# Patient Record
Sex: MALE | Race: ASIAN | NOT HISPANIC OR LATINO | Employment: FULL TIME | URBAN - METROPOLITAN AREA
[De-identification: names, ages, dates, MRNs, and addresses within clinical notes are randomized per-mention and may not be internally consistent; named-entity substitution may affect disease eponyms.]

---

## 2017-01-04 ENCOUNTER — ALLSCRIPTS OFFICE VISIT (OUTPATIENT)
Dept: OTHER | Facility: OTHER | Age: 47
End: 2017-01-04

## 2017-01-19 ENCOUNTER — ALLSCRIPTS OFFICE VISIT (OUTPATIENT)
Dept: OTHER | Facility: OTHER | Age: 47
End: 2017-01-19

## 2017-01-31 ENCOUNTER — ALLSCRIPTS OFFICE VISIT (OUTPATIENT)
Dept: OTHER | Facility: OTHER | Age: 47
End: 2017-01-31

## 2017-02-13 ENCOUNTER — ALLSCRIPTS OFFICE VISIT (OUTPATIENT)
Dept: OTHER | Facility: OTHER | Age: 47
End: 2017-02-13

## 2017-02-13 ENCOUNTER — GENERIC CONVERSION - ENCOUNTER (OUTPATIENT)
Dept: OTHER | Facility: OTHER | Age: 47
End: 2017-02-13

## 2017-02-13 LAB
A/G RATIO (HISTORICAL): 2.2 (ref 1.1–2.5)
ALBUMIN SERPL BCP-MCNC: 4.6 G/DL (ref 3.5–5.5)
ALP SERPL-CCNC: 116 IU/L (ref 39–117)
ALT SERPL W P-5'-P-CCNC: 40 IU/L (ref 0–44)
AST SERPL W P-5'-P-CCNC: 21 IU/L (ref 0–40)
BASOPHILS # BLD AUTO: 0 X10E3/UL (ref 0–0.2)
BASOPHILS # BLD AUTO: 1 %
BILIRUB SERPL-MCNC: 0.4 MG/DL (ref 0–1.2)
BUN SERPL-MCNC: 13 MG/DL (ref 6–24)
BUN/CREA RATIO (HISTORICAL): 13 (ref 9–20)
CALCIUM SERPL-MCNC: 9.2 MG/DL (ref 8.7–10.2)
CHLORIDE SERPL-SCNC: 106 MMOL/L (ref 96–106)
CHOLEST SERPL-MCNC: 161 MG/DL (ref 100–199)
CHOLEST/HDLC SERPL: 4.2 RATIO UNITS (ref 0–5)
CO2 SERPL-SCNC: 19 MMOL/L (ref 18–29)
CREAT SERPL-MCNC: 0.98 MG/DL (ref 0.76–1.27)
DEPRECATED RDW RBC AUTO: 13.6 % (ref 12.3–15.4)
EGFR AFRICAN AMERICAN (HISTORICAL): 107 ML/MIN/1.73
EGFR-AMERICAN CALC (HISTORICAL): 93 ML/MIN/1.73
EOSINOPHIL # BLD AUTO: 0.1 X10E3/UL (ref 0–0.4)
EOSINOPHIL # BLD AUTO: 2 %
GLUCOSE SERPL-MCNC: 107 MG/DL (ref 65–99)
HCT VFR BLD AUTO: 44.9 % (ref 37.5–51)
HDLC SERPL-MCNC: 38 MG/DL
HGB BLD-MCNC: 15.4 G/DL (ref 12.6–17.7)
IMM.GRANULOCYTES (CD4/8) (HISTORICAL): 0 X10E3/UL (ref 0–0.1)
IMM.GRANULOCYTES (CD4/8) (HISTORICAL): 1 %
LDLC SERPL CALC-MCNC: 96 MG/DL (ref 0–99)
LYMPHOCYTES # BLD AUTO: 1.7 X10E3/UL (ref 0.7–3.1)
LYMPHOCYTES # BLD AUTO: 32 %
MCH RBC QN AUTO: 28.4 PG (ref 26.6–33)
MCHC RBC AUTO-ENTMCNC: 34.3 G/DL (ref 31.5–35.7)
MCV RBC AUTO: 83 FL (ref 79–97)
MONOCYTES # BLD AUTO: 0.4 X10E3/UL (ref 0.1–0.9)
MONOCYTES (HISTORICAL): 7 %
NEUTROPHILS # BLD AUTO: 3.2 X10E3/UL (ref 1.4–7)
NEUTROPHILS # BLD AUTO: 57 %
PLATELET # BLD AUTO: 293 X10E3/UL (ref 150–379)
POTASSIUM SERPL-SCNC: 4.3 MMOL/L (ref 3.5–5.2)
RBC (HISTORICAL): 5.43 X10E6/UL (ref 4.14–5.8)
SODIUM SERPL-SCNC: 142 MMOL/L (ref 134–144)
TOT. GLOBULIN, SERUM (HISTORICAL): 2.1 G/DL (ref 1.5–4.5)
TOTAL PROTEIN (HISTORICAL): 6.7 G/DL (ref 6–8.5)
TRIGL SERPL-MCNC: 134 MG/DL (ref 0–149)
VLDLC SERPL CALC-MCNC: 27 MG/DL (ref 5–40)
WBC # BLD AUTO: 5.4 X10E3/UL (ref 3.4–10.8)

## 2017-02-14 LAB — INTERPRETATION (HISTORICAL): NORMAL

## 2017-02-15 ENCOUNTER — GENERIC CONVERSION - ENCOUNTER (OUTPATIENT)
Dept: OTHER | Facility: OTHER | Age: 47
End: 2017-02-15

## 2017-02-27 ENCOUNTER — ALLSCRIPTS OFFICE VISIT (OUTPATIENT)
Dept: OTHER | Facility: OTHER | Age: 47
End: 2017-02-27

## 2017-05-20 ENCOUNTER — ALLSCRIPTS OFFICE VISIT (OUTPATIENT)
Dept: OTHER | Facility: OTHER | Age: 47
End: 2017-05-20

## 2017-06-05 ENCOUNTER — ALLSCRIPTS OFFICE VISIT (OUTPATIENT)
Dept: OTHER | Facility: OTHER | Age: 47
End: 2017-06-05

## 2017-07-24 ENCOUNTER — TRANSCRIBE ORDERS (OUTPATIENT)
Dept: ADMINISTRATIVE | Facility: HOSPITAL | Age: 47
End: 2017-07-24

## 2017-07-24 ENCOUNTER — ALLSCRIPTS OFFICE VISIT (OUTPATIENT)
Dept: OTHER | Facility: OTHER | Age: 47
End: 2017-07-24

## 2017-07-24 DIAGNOSIS — R05.9 COUGH: ICD-10-CM

## 2017-07-24 DIAGNOSIS — R05.9 COUGH: Primary | ICD-10-CM

## 2017-07-25 ENCOUNTER — ALLSCRIPTS OFFICE VISIT (OUTPATIENT)
Dept: OTHER | Facility: OTHER | Age: 47
End: 2017-07-25

## 2017-08-04 ENCOUNTER — HOSPITAL ENCOUNTER (OUTPATIENT)
Dept: RADIOLOGY | Facility: HOSPITAL | Age: 47
Discharge: HOME | End: 2017-08-04
Payer: COMMERCIAL

## 2017-08-04 DIAGNOSIS — R05.9 COUGH: ICD-10-CM

## 2017-08-04 PROCEDURE — 70486 CT MAXILLOFACIAL W/O DYE: CPT

## 2017-08-04 PROCEDURE — 71260 CT THORAX DX C+: CPT

## 2017-08-04 RX ADMIN — IOHEXOL 85 ML: 350 INJECTION, SOLUTION INTRAVENOUS at 08:33

## 2017-08-08 ENCOUNTER — GENERIC CONVERSION - ENCOUNTER (OUTPATIENT)
Dept: OTHER | Facility: OTHER | Age: 47
End: 2017-08-08

## 2017-09-18 ENCOUNTER — ALLSCRIPTS OFFICE VISIT (OUTPATIENT)
Dept: OTHER | Facility: OTHER | Age: 47
End: 2017-09-18

## 2017-10-05 ENCOUNTER — GENERIC CONVERSION - ENCOUNTER (OUTPATIENT)
Dept: OTHER | Facility: OTHER | Age: 47
End: 2017-10-05

## 2017-10-16 ENCOUNTER — HOSPITAL ENCOUNTER (EMERGENCY)
Facility: HOSPITAL | Age: 47
Discharge: HOME | End: 2017-10-16
Attending: EMERGENCY MEDICINE | Admitting: EMERGENCY MEDICINE
Payer: COMMERCIAL

## 2017-10-16 VITALS
HEIGHT: 69 IN | WEIGHT: 185 LBS | DIASTOLIC BLOOD PRESSURE: 85 MMHG | HEART RATE: 69 BPM | RESPIRATION RATE: 16 BRPM | BODY MASS INDEX: 27.4 KG/M2 | OXYGEN SATURATION: 97 % | SYSTOLIC BLOOD PRESSURE: 134 MMHG | TEMPERATURE: 98.2 F

## 2017-10-16 DIAGNOSIS — S01.01XA LACERATION OF SCALP, INITIAL ENCOUNTER: Primary | ICD-10-CM

## 2017-10-16 PROCEDURE — 99282 EMERGENCY DEPT VISIT SF MDM: CPT

## 2017-10-16 RX ORDER — LIDOCAINE HYDROCHLORIDE AND EPINEPHRINE BITARTRATE 20; .01 MG/ML; MG/ML
10 INJECTION, SOLUTION SUBCUTANEOUS ONCE
Status: COMPLETED | OUTPATIENT
Start: 2017-10-16 | End: 2017-10-16

## 2017-10-16 RX ORDER — LAMOTRIGINE 200 MG/1
300 TABLET ORAL DAILY
COMMUNITY
End: 2018-02-19

## 2017-10-16 RX ORDER — ESCITALOPRAM OXALATE 20 MG/1
20 TABLET ORAL DAILY
COMMUNITY
End: 2018-02-19

## 2017-10-16 RX ORDER — ATORVASTATIN CALCIUM 20 MG/1
20 TABLET, FILM COATED ORAL DAILY
COMMUNITY
End: 2018-02-19

## 2017-10-16 RX ORDER — GABAPENTIN 300 MG/1
100 CAPSULE ORAL 2 TIMES DAILY
COMMUNITY
End: 2018-02-19

## 2017-10-16 RX ORDER — BACITRACIN, NEOMYCIN, POLYMYXIN B 400; 3.5; 5 [USP'U]/G; MG/G; [USP'U]/G
OINTMENT TOPICAL 2 TIMES DAILY
Qty: 15 G | Refills: 0 | Status: SHIPPED | OUTPATIENT
Start: 2017-10-16 | End: 2018-02-19

## 2017-10-16 RX ADMIN — LIDOCAINE HYDROCHLORIDE,EPINEPHRINE BITARTRATE 10 ML: 20; .01 INJECTION, SOLUTION INFILTRATION; PERINEURAL at 12:38

## 2017-10-16 NOTE — DISCHARGE INSTRUCTIONS
Please take a list of all of your medications and discharge paperwork with you to all of your follow-up medical visits  Please take all of your medications as directed  Please call your family doctor or return to the ER if you have increased shortness of breath, chest pain, fevers, chills, nausea, vomiting, diarrhea, or any other worsening symptoms  Laceration   WHAT YOU NEED TO KNOW:   A laceration is an injury to the skin and the soft tissue underneath it  Lacerations happen when you are cut or hit by something  They can happen anywhere on the body  DISCHARGE INSTRUCTIONS:   Return to the emergency department if:   · You have heavy bleeding or bleeding that does not stop after 10 minutes of holding firm, direct pressure over the wound  · Your wound opens up  Contact your healthcare provider if:   · You have a fever or chills  · Your laceration is red, warm, or swollen  · You have red streaks on your skin coming from your wound  · You have white or yellow drainage from the wound that smells bad  · You have pain that gets worse, even after treatment  · You have questions or concerns about your condition or care  Medicines:   · Prescription pain medicine  may be given  Ask how to take this medicine safely  · Antibiotics  help treat or prevent a bacterial infection  · Take your medicine as directed  Contact your healthcare provider if you think your medicine is not helping or if you have side effects  Tell him or her if you are allergic to any medicine  Keep a list of the medicines, vitamins, and herbs you take  Include the amounts, and when and why you take them  Bring the list or the pill bottles to follow-up visits  Carry your medicine list with you in case of an emergency  Care for your wound as directed:   · Do not get your wound wet  until your healthcare provider says it is okay  Do not soak your wound in water   Do not go swimming until your healthcare provider says it is okay  Carefully wash the wound with soap and water  Gently pat the area dry or allow it to air dry  · Change your bandages  when they get wet, dirty, or after washing  Apply new, clean bandages as directed  Do not apply elastic bandages or tape too tight  Do not put powders or lotions over your incision  · Apply antibiotic ointment as directed  Your healthcare provider may give you antibiotic ointment to put over your wound if you have stitches  If you have strips of tape over your incision, let them dry up and fall off on their own  If they do not fall off within 14 days, gently remove them  If you have glue over your wound, do not remove or pick at it  If your glue comes off, do not replace it with glue that you have at home  · Check your wound every day for signs of infection such as swelling, redness, or pus  Self-care:   · Apply ice  on your wound for 15 to 20 minutes every hour or as directed  Use an ice pack, or put crushed ice in a plastic bag  Cover it with a towel  Ice helps prevent tissue damage and decreases swelling and pain  · Use a splint as directed  A splint will decrease movement and stress on your wound  It may help it heal faster  A splint may be used for lacerations over joints or areas of your body that bend  Ask your healthcare provider how to apply and remove a splint  · Decrease scarring of your wound  by applying ointments as directed  Do not apply ointments until your healthcare provider says it is okay  You may need to wait until your wound is healed  Ask which ointment to buy and how often to use it  After your wound is healed, use sunscreen over the area when you are out in the sun  You should do this for at least 6 months to 1 year after your injury  Follow up with your healthcare provider as directed: You may need to follow up in 24 to 48 hours to have your wound checked for infection   You will need to return in 3 to 14 days if you have stitches or staples so they can be removed  Care for your wound as directed to prevent infection and help it heal  Write down your questions so you remember to ask them during your visits  © 2017 [address] [employer] Information is for End User's use only and may not be sold, redistributed or otherwise used for commercial purposes  All illustrations and images included in CareNotes® are the copyrighted property of A D [employer] M [employer] Inc  or Aneudy Mccormick  The above information is an  only  It is not intended as medical advice for individual conditions or treatments  Talk to your doctor, nurse or pharmacist before following any medical regimen to see if it is safe and effective for you

## 2017-10-17 NOTE — ED PROVIDER NOTES
History  Chief Complaint   Patient presents with    Head Laceration     hit into the bunk beds while standing up  60-year-old male presents to the ER with scalp laceration  Patient states that about 2-3 hours prior to arrival he was getting out from the bottom bed of a bunk bed when he hit his the top of his head against the metal bar of the bed above  Patient's tetanus is up-to-date  Patient came to the ER for further evaluation of laceration  Head Laceration   Associated symptoms: no fever        Prior to Admission Medications   Prescriptions Last Dose Informant Patient Reported? Taking?   atorvastatin (LIPITOR) 20 mg tablet 10/16/2017 at Unknown time  Yes Yes   Sig: Take 20 mg by mouth daily   escitalopram (LEXAPRO) 20 mg tablet 10/16/2017 at Unknown time  Yes Yes   Sig: Take 20 mg by mouth daily   gabapentin (NEURONTIN) 300 mg capsule 10/16/2017 at Unknown time  Yes Yes   Sig: Take 100 mg by mouth 2 (two) times a day   lamoTRIgine (LaMICtal) 200 MG tablet 10/16/2017 at Unknown time  Yes Yes   Sig: Take 300 mg by mouth daily      Facility-Administered Medications: None       Past Medical History:   Diagnosis Date    Hyperlipidemia     Hypertension     Psychiatric disorder        Past Surgical History:   Procedure Laterality Date    ELBOW ARTHROPLASTY      KNEE ARTHROCENTESIS      SHOULDER ARTHROPLASTY         History reviewed  No pertinent family history  I have reviewed and agree with the history as documented  Social History   Substance Use Topics    Smoking status: Never Smoker    Smokeless tobacco: Never Used    Alcohol use No        Review of Systems   Constitutional: Negative for activity change, fatigue and fever  HENT: Negative for congestion, ear discharge and sore throat  Eyes: Negative for pain and redness  Respiratory: Negative for cough, chest tightness, shortness of breath and wheezing  Cardiovascular: Negative for chest pain     Gastrointestinal: Negative for abdominal pain, diarrhea, nausea and vomiting  Endocrine: Negative for cold intolerance  Genitourinary: Negative for dysuria and urgency  Musculoskeletal: Negative for arthralgias and back pain  Skin: Positive for wound  Neurological: Negative for dizziness, weakness and headaches  Psychiatric/Behavioral: Negative for agitation and behavioral problems  Physical Exam  ED Triage Vitals [10/16/17 1152]   Temperature Pulse Respirations Blood Pressure SpO2   98 2 °F (36 8 °C) 69 16 134/85 97 %      Temp src Heart Rate Source Patient Position - Orthostatic VS BP Location FiO2 (%)   -- Monitor Lying Right arm --      Pain Score       7           Physical Exam   Constitutional: He is oriented to person, place, and time  He appears well-developed and well-nourished  HENT:   Head: Normocephalic and atraumatic  Nose: Nose normal    Mouth/Throat: Oropharynx is clear and moist    2 5 cm curved partial-thickness laceration noted above the left occiput region  Area is mildly bleeding  Eyes: Conjunctivae and EOM are normal    Neck: Normal range of motion  Neck supple  Cardiovascular: Normal rate, regular rhythm and normal heart sounds  Pulmonary/Chest: Effort normal and breath sounds normal    Abdominal: Soft  Bowel sounds are normal  He exhibits no distension  There is no tenderness  Musculoskeletal: Normal range of motion  Neurological: He is alert and oriented to person, place, and time  Skin: Skin is warm  Psychiatric: He has a normal mood and affect  His behavior is normal  Judgment and thought content normal    Nursing note and vitals reviewed        ED Medications  Medications   lidocaine-epinephrine (XYLOCAINE/EPINEPHRINE) 2 %-1:100,000 injection 10 mL (10 mL Infiltration Given 10/16/17 1238)       Diagnostic Studies  Labs Reviewed - No data to display    No orders to display       Procedures  Lac Repair  Date/Time: 10/16/2017 12:40 PM  Performed by: Ying Oliveira  Authorized by: Claudia Denton     Patient location:  ED and bedside  Consent:     Consent obtained:  Verbal    Consent given by:  Patient    Risks discussed:  Infection, pain, retained foreign body, need for additional repair, poor cosmetic result, nerve damage, poor wound healing and vascular damage    Alternatives discussed:  No treatment, delayed treatment, observation and referral  Universal protocol:     Patient identity confirmed:  Verbally with patient  Anesthesia (see MAR for exact dosages): Anesthesia method:  Local infiltration    Local anesthetic:  Lidocaine 2% WITH epi  Laceration details:     Location:  Scalp (2 5 cm curved partial-thickness laceration noted above the left occiput region  )  Repair type:     Repair type:  Simple  Pre-procedure details:     Preparation:  Patient was prepped and draped in usual sterile fashion  Exploration:     Wound exploration: wound explored through full range of motion and entire depth of wound probed and visualized    Treatment:     Area cleansed with:  Saline    Amount of cleaning:  Standard  Skin repair:     Repair method:  Staples    Number of staples:  5  Approximation:     Approximation:  Close    Approximation difficulty:  Simple    Vermilion border: well-aligned    Post-procedure details:     Patient tolerance of procedure: Tolerated well, no immediate complications          Phone Contacts  ED Phone Contact    ED Course  ED Course                                MDM  Number of Diagnoses or Management Options  Laceration of scalp, initial encounter:   Risk of Complications, Morbidity, and/or Mortality  General comments: Patient present with scalp laceration  Laceration repaired with staples  Patient tolerated procedure well  Patient discharged home with return to either his PCP or ER in 5-7 days for staple removal   Patient agrees with discharge plan  Patient well appearing at time of discharge      Patient Progress  Patient progress: stable    CritCare Time    Disposition  Final diagnoses:   Laceration of scalp, initial encounter     ED Disposition     ED Disposition Condition Comment    Discharge  Bernie Shepherd discharge to home/self care  Condition at discharge: Good        Follow-up Information     Follow up With Specialties Details Why Contact Info Additional [address] [place], DO Family Medicine In 1 week for staple removal [address] [facility]  [address] [facility] Emergency Department Emergency Medicine In 1 week for staple removal if your family doctor is not available  [address] [place] [address]  [phone] [facility] [address]        Discharge Medication List as of 10/16/2017  1:23 PM      START taking these medications    Details   neomycin-bacitracin-polymyxin b (NEOSPORIN) ointment Apply topically 2 (two) times a day, Starting Mon 10/16/2017, Print         CONTINUE these medications which have NOT CHANGED    Details   atorvastatin (LIPITOR) 20 mg tablet Take 20 mg by mouth daily, Historical Med      escitalopram (LEXAPRO) 20 mg tablet Take 20 mg by mouth daily, Historical Med      gabapentin (NEURONTIN) 300 mg capsule Take 100 mg by mouth 2 (two) times a day, Historical Med      lamoTRIgine (LaMICtal) 200 MG tablet Take 300 mg by mouth daily, Historical Med           No discharge procedures on file      ED Provider  Electronically Signed by       Marielos Wayne DO  10/17/17 3582

## 2017-10-23 ENCOUNTER — ALLSCRIPTS OFFICE VISIT (OUTPATIENT)
Dept: OTHER | Facility: OTHER | Age: 47
End: 2017-10-23

## 2017-10-24 NOTE — PROGRESS NOTES
Assessment  1  Laceration of scalp (873 0) (S01 01XA)   2  Dysfunction of both eustachian tubes (381 81) (H69 83)   3  BMI 27 0-27 9,adult (V85 23) (Z68 27)    Plan  BMI 27 0-27 9,adult    · Begin a limited exercise program ; Status:Complete;   Done: [id number]  Dysfunction of both eustachian tubes    · MethylPREDNISolone 4 MG Oral Tablet; USE AS DIRECTED    Discussion/Summary    Staples removed from scalp without incident  No bleeding or wound dehiscence  Possible side effects of new medications were reviewed with the patient/guardian today  The treatment plan was reviewed with the patient/guardian  The patient/guardian understands and agrees with the treatment plan      Chief Complaint  Here to have staples removed from his head after he hit his head on bunk beds  They were placed at [facility] ER 7 days ago [facility]      History of Present Illness  HPI: Sustained a laceration to his scalp when he was moving a set of bunk beds and hit his head  Was evaluated in the ER and had wound closed with staples  Here today to have them reviewed  ears are still bothering him after completing antibiotics and using drops  They are popping constantly  Not taking anything OTC for symptoms  Review of Systems    Constitutional: no fever or chills, feels well, no tiredness, no recent weight loss or weight gain  ENT: earache, but-- as noted in HPI  Respiratory: no shortness of breath-- and-- no cough  Integumentary: skin wound, but-- as noted in HPI  Active Problems  1  Allergic rhinitis (477 9) (J30 9)   2  Benign familial tremor (333 1) (G25 0)   3  Bipolar disorder (296 80) (F31 9)   4  Bronchitis (490) (J40)   5  Chronic cough (786 2) (R05)   6  Chronic migraine (346 70) (G43 709)   7  Cough (786 2) (R05)   8  Cubital Tunnel Syndrome (354 2)   9  Depression (311) (F32 9)   10  Hand pain, unspecified laterality   11  History of sinus surgery (V45 89) (Z98 890)   12  Hypercholesterolemia (272 0) (E78 00)   13  Migraine, unspecified, not intractable, without status migrainosus (346 90) (G43 909)   14  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 42) (F51 01)   15  Right knee pain (719 46) (M25 561)   16  Right knee sprain (844 9) (S83 91XA)   17  Screening for cardiovascular condition (V81 2) (Z13 6)   18  Screening for prostate cancer (V76 44) (Z12 5)   19  Screening for thyroid disorder (V77 0) (Z13 29)   20  Tinnitus, unspecified laterality (388 30) (H93 19)    Past Medical History  1  History of Acute atopic conjunctivitis, unspecified laterality (372 05) (H10 10)   2  History of Acute maxillary sinusitis (461 0) (J01 00)   3  History of Acute otitis media, bilateral (382 9) (H66 93)   4  History of Acute suppurative otitis media without spontaneous rupture of ear drum,   unspecified laterality   5  Acute upper respiratory infection (465 9) (J06 9)   6  History of Acute upper respiratory infection (465 9) (J06 9)   7  History of Bilateral impacted cerumen (380 4) (H61 23)   8  Contact dermatitis due to plant (692 6) (L25 5)   9  History of acute bronchitis (V12 69) (Z87 09)   10  History of acute sinusitis (V12 69) (Z87 09)   11  History of acute sinusitis (V12 69) (Z87 09)   12  History of acute sinusitis (V12 69) (Z87 09)   13  Otalgia, unspecified laterality (388 70) (H92 09)   14  Well adult on routine health check (V70 0) (Z00 00)  Active Problems And Past Medical History Reviewed: The active problems and past medical history were reviewed and updated today  Family History  Mother    1  Family history of migraine headaches (V17 2) (Z82 0)  Family History    2  Family history of Arthritis (V17 7)   3  Family history of Breast Cancer (V16 3)   4  Family history of cardiac disorder (V17 49) (Z82 49)   5   Family history of hypertension (V17 49) (Z82 49)    Social History   · Being A Social Drinker   · Daily Tea Consumption (___ Cups/Day)   ·    · Exposure to secondhand smoke (V15 89) (Z77 22)   · Marital History - Currently    · Never a smoker   · Occupation   ·  for [address] [place] [address] history was reviewed and is unchanged  Surgical History  1  History of Elbow Surgery   2  History of Sinus Surgery   3  History of Tonsillectomy    Current Meds   1  Atorvastatin Calcium 20 MG Oral Tablet; TAKE 1 TABLET DAILY  Requested for:   09GJM0401; Last Rx:[id number] Ordered   2  Gabapentin 300 MG Oral Capsule; TAKE 1 CAPSULE 3 TIMES DAILY; Therapy: 91TWP1249 to (Evaluate:30Nov2017)  Requested for: 04Ruz6653; Last   Rx:53Vpo7192 Ordered   3  LaMICtal 150 MG Oral Tablet; 2 Every Day; Therapy: 51KJQ6770 to  Requested for: 13GFJ0800 Recorded   4  Lexapro 20 MG Oral Tablet; 1 Every Day; Therapy: 64TCY4851 to  Requested for: 20CPP5807 Recorded    The medication list was reviewed and updated today  Allergies  1  No Known Drug Allergies    Vitals   Recorded: 92ZXI1048 04:10PM   Temperature 99 1 F   Heart Rate 96   Respiration 16   Systolic 974   Diastolic 88   Height 5 ft 9 5 in   Weight 189 lb    BMI Calculated 27 51   BSA Calculated 2 03     Physical Exam    Constitutional   General appearance: No acute distress, well appearing and well nourished  Eyes   Conjunctiva and lids: No swelling, erythema, or discharge  Ears, Nose, Mouth, and Throat   Otoscopic examination: Abnormal   Exam of the right middle ear showed a middle ear effusion  Exam of the left middle ear showed a middle ear effusion  Nasal mucosa, septum, and turbinates: Normal without edema or erythema  Oropharynx: Normal with no erythema, edema, exudate or lesions  Pulmonary   Respiratory effort: No increased work of breathing or signs of respiratory distress  Auscultation of lungs: Clear to auscultation, equal breath sounds bilaterally, no wheezes, no rales, no rhonci  Cardiovascular   Auscultation of heart: Normal rate and rhythm, normal S1 and S2, without murmurs      Examination of extremities for edema and/or varicosities: Normal     Lymphatic   Palpation of lymph nodes in neck: No lymphadenopathy  Skin scalp laceration wound edges well approximated, staples intact, no drainage  Psychiatric   Mood and affect: Normal          Attending Note  Collaborating Physician Note: Collaborating Note: I agree with the Advanced Practitioner note  Future Appointments    Date/Time Provider Specialty Site   10/30/2017 02:00 PM CARROL Crooks  Neurology NEUROLOGY [facility]   12/22/2017 09:00 AM CARROL Crooks   Neurology NEUROLOGY [facility]   Electronically signed by : Vikash Nuñez; Oct 23 2017  4:37PM EST                       (Author)    Electronically signed by : Jose Muse DO; Oct 23 2017  4:38PM EST                       (Review)

## 2017-10-25 NOTE — PROGRESS NOTES
Assessment  1  Chronic cough (786 2) (R05)   2  History of sinus surgery (V45 89) (Z98 730)    Plan  Chronic cough    · Gabapentin 300 MG Oral Capsule; 1 tab at HS x 3 days, the 1 tab BID x 7 days,  then TID   Rx By: Ashley Saundesr; Dispense: 30 Days ; #:80 Capsule; Refill: 0;For: Chronic cough; BYRON = N; Verified Transmission to [address]; Msg to Pharmacy: extra tabsl; Last Updated By: System, [employer]; 7/24/2017 3:36:57 PM   · (1) BASIC METABOLIC PROFILE; Status:Active; Requested for:15Atv0923;    Perform:[facility]; Order Comments:CT of chest with contrast; Due:31Kki6706; Ordered; For:Chronic cough; Ordered By:Lisa Ibrahim;   · CT CHEST W CONTRAST; Status:Need Information - Financial Authorization; Requested  for:83Ira6296;    Perform:[facility]; LGZ:[id number]; Ordered; For:Chronic cough; Ordered By:Lisa Ibrahim;   · CT SINUS WO CONTRAST; Status:Need Information - Financial Authorization; Requested for:[id number];    Perform:[facility]; VXS:[id number]; Ordered; For:Chronic cough; Ordered By:Lisa Ibrahim;    Discussion/Summary  Discussion Summary:   Fredirick Dancer has persistent cough  He did have improvement with gabapentin for cough hypersensitivity syndrome or sensory neuropathic cough  Gabapentin benefits cough through neuromodulation  Dosing guidelines outline 300mg at HS x 3 days, then BID x 7days then three times daily  I am ordering one month prescription  will have CT of chest with contrast and CT of sinuses  He has history of chronic sinus disease and resultant upper airway cough syndrome is differential diagnosis  did not have any lethargy or mental impairment with cough  up in 3 months  Counseling Documentation With Imm: The patient was counseled regarding  Goals and Barriers: The patient has the current Goals: Fredirick Dancer will take gabapentin 300 mg as outlined above  Patient's Capacity to Self-Care: Patient is able to Self-Care  Active Problems  1   Acute sinusitis (461 9) (J01 90)   2  Allergic rhinitis (477 9) (J30 9)   3  Benign familial tremor (333 1) (G25 0)   4  Bipolar disorder (296 80) (F31 9)   5  Bronchitis (490) (J40)   6  Chronic cough (786 2) (R05)   7  Chronic migraine (346 70) (G43 709)   8  Cough (786 2) (R05)   9  Cubital Tunnel Syndrome (354 2)   10  Depression (311) (F32 9)   11  Hand pain, unspecified laterality   12  Hypercholesterolemia (272 0) (E78 00)   13  Migraine headache (346 90) (G43 909)   14  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 42) (F51 01)   15  Right knee pain (719 46) (M25 561)   16  Right knee sprain (844 9) (S83 91XA)   17  Screening for cardiovascular condition (V81 2) (Z13 6)   18  Screening for prostate cancer (V76 44) (Z12 5)   19  Screening for thyroid disorder (V77 0) (Z13 29)   20  Tinnitus, unspecified laterality (388 30) (H93 19)    Chief Complaint  Chief Complaint Free Text Note Form: Jarocho Prior presents with cough, non-productive  He states he has had cough since February 2016  Denies SOB      History of Present Illness  HPI: Joselyn Benz is here today for cough  He has chronic cough and was last seen by Dr Saul Jefferson on 1/31/17 and December 19, 2016  He was prescribed both prednisone and Advair  There was no improvement in cough and gabapentin was prescribed  he had very good results within 2 week period done in 12/16 revealed near normal spirometry  PEF was decreased at 42% of predicted; this was likely due to suboptimal effort  was prescribed gabapentin for chronic cough neuromodulation  He did escalate dose to 3x daily of gabapentin 300 mg with good result  took the gabapentin for about one month and stopped  He cough has worsened  Review of Systems  Complete-Male Pulm:   Constitutional: No fever or chills, feels well, no tiredness, no recent weight gain or weight loss  Eyes: no complaints of vision problems  ENT: no rhinitis, no PND, no epistaxis  Cardiovascular: no palpitations, no chest pain     Respiratory: as noted in HPI--    The patient presents with complaints of gradual onset of intermittent episodes of moderate cough  His symptoms are caused by no known event  Symptoms are worsening  Gastrointestinal: no complaints of esophageal reflux, no abdominal pain  Genitourinary: no urinary retention  Musculoskeletal: no arthralgias, no joint swelling, no myalgias  Integumentary: no rash, no lesions  Neurological: no headache, no fainting, no weakness  Psychiatric: no anxiety, no depression  Hematologic/Lymphatic: no complaints of swollen glands  Past Medical History  1  History of Acute atopic conjunctivitis, unspecified laterality (372 05) (H10 10)   2  History of Acute maxillary sinusitis (461 0) (J01 00)   3  History of Acute suppurative otitis media without spontaneous rupture of ear drum,   unspecified laterality   4  Acute upper respiratory infection (465 9) (J06 9)   5  History of Acute upper respiratory infection (465 9) (J06 9)   6  Contact dermatitis due to plant (692 6) (L25 5)   7  History of acute bronchitis (V12 69) (Z87 09)   8  History of acute sinusitis (V12 69) (Z87 09)   9  Otalgia, unspecified laterality (388 70) (H92 09)   10  Well adult on routine health check (V70 0) (Z00 00)    Surgical History  1  History of Elbow Surgery   2  History of Sinus Surgery   3  History of Tonsillectomy  Surgical History Reviewed: The surgical history was reviewed and updated today  Family History  Mother    1  Family history of migraine headaches (V17 2) (Z82 0)  Family History    2  Family history of Arthritis (V17 7)   3  Family history of Breast Cancer (V16 3)   4  Family history of cardiac disorder (V17 49) (Z82 49)   5  Family history of hypertension (V17 49) (Z82 49)  Family History Reviewed: The family history was reviewed and updated today         Social History   · Being A Social Drinker   · Daily Tea Consumption (___ Cups/Day)   ·    · Exposure to secondhand smoke (V15 89) (Z77 22)   · Marital History - Currently    · Never a smoker   · Occupation   ·  for [address] [place] [address] History Reviewed: The social history was reviewed and updated today  Current Meds   1  Atorvastatin Calcium 20 MG Oral Tablet; TAKE 1 TABLET DAILY  Requested for:   03OXS8067; Last Rx:73Gzw8995 Ordered   2  Azithromycin 250 MG Oral Tablet; TAKE 2 TABLETS ON DAY 1 THEN TAKE 1 TABLET A   DAY FOR 4 DAYS; Therapy: 41OIX0950 to (Last BN:83NEL6542)  Requested for: 90CVH8291 Ordered   3  Fluticasone Propionate 50 MCG/ACT Nasal Suspension; USE 2 SPRAYS IN EACH   NOSTRIL ONCE DAILY; Therapy: 72REV0835 to (Last Rx:85Iof1909)  Requested for: 70CPF4726 Ordered   4  LaMICtal 150 MG Oral Tablet; 2 Every Day; Therapy: 95DDB7454 to  Requested for: 15OTJ8818 Recorded   5  Lexapro 20 MG Oral Tablet; 1 Every Day; Therapy: 32SUI4558 to  Requested for: 51IKZ7124 Recorded   6  MethylPREDNISolone 4 MG Oral Tablet Therapy Pack; as directed; Therapy: 17MRP2066 to (Last CX:99QQJ1135)  Requested for: 72KLL4479 Ordered  Medication List Reviewed: The medication list was reviewed and updated today  Allergies  1  No Known Drug Allergies    Vitals  Vital Signs    Recorded: 14Mij8210 03:06PM   Temperature 97 7 F, Oral   Heart Rate 80   Pulse Quality Normal   Respiration Quality Normal   Respiration 18   Systolic 048, LUE, Sitting   Diastolic 78, LUE, Sitting   Height 5 ft 9 5 in   Weight 186 lb    BMI Calculated 27 07   BSA Calculated 2 01   O2 Saturation 96, RA     Physical Exam    Constitutional   General appearance: No acute distress, well appearing and well nourished  Eyes   Examination of pupil and irises: Anicteric, pupils reactive  Ears, Nose, Mouth, and Throat   External inspection of ears and nose: Normal     Nasal mucosa, septum, and turbinates: Normal without edema or erythema  Lips, teeth, and gums: Normal, good dentition  Oropharynx: Normal with no erythema, edema, exudate or lesions    Mallampati Classification: 4  Neck   Neck: Supple, symmetric, trachea midline, no masses  Jugular veins: Normal     Pulmonary   Chest: Normal     Auscultation of lungs: Clear to auscultation, no rales, no crackles, no wheezing  Cardiovascular   Auscultation of heart: Normal rate and rhythm, normal S1 and S2, no murmurs  Examination of extremities for edema and/or varicosities: Normal     Abdomen   Abdomen: Soft, non-tender  Lymphatic   Palpation of lymph nodes in neck: No lymphadenopathy  Musculoskeletal   Gait and station: Normal     Digits and nails: Normal without clubbing or cyanosis  Neurologic   Mental Status: Normal  Not confused, no evidence of dementia, good comprehension, good concentration  Skin   Skin and subcutaneous tissue: Limited exam shows no rash  Psychiatric   Orientation to person, place and time: Normal     Mood and affect: Normal        Future Appointments    Date/Time Provider Specialty Site   07/25/2017 03:00 PM CARROL Helm  Neurology NEUROLOGY [facility]   09/18/2017 10:00 AM CARROL Helm   Neurology NEUROLOGY [facility]   Electronically signed by : ABBY cShulte; Jul 24 2017  3:41PM EST                       (Author)    Electronically signed by : DAVID Sotelo ; Sep  1 2017  9:56AM EST                       (Author)

## 2017-10-30 ENCOUNTER — ALLSCRIPTS OFFICE VISIT (OUTPATIENT)
Dept: OTHER | Facility: OTHER | Age: 47
End: 2017-10-30

## 2017-10-31 NOTE — PROGRESS NOTES
Assessment  1  Migraine, unspecified, not intractable, without status migrainosus (346 90) (G44 542)    Discussion/Summary  Discussion Summary:   Patient is doing quite well  His headaches are 1 every 1-2months and manageable with prn sumavel injections  will return for botox therapy on Dec 2nd  Counseling Documentation With Imm: The patient was counseled regarding instructions for management,-- risk factor reductions,-- prognosis,-- patient and family education,-- impressions,-- risks and benefits of treatment options,-- importance of compliance with treatment  total time of encounter was 20 minutes-- and-- 10 minutes was spent counseling  Goals and Barriers: The patient has the current Goals: At his goal        Patient's Capacity to Self-Care: Patient is able to Self-Care  Medication SE Review and Pt Understands Tx: The treatment plan was reviewed with the patient/guardian  The patient/guardian understands and agrees with the treatment plan       Headache [facility]:   The patient was counseled regarding;   Discussed side effects of all medications prescribed today to the patient in detail  Patient education was completed today and we also discussed precautions for rebound headaches  --    When patient has a moderate to severe headache, they should seek rest, initiate relaxation and apply cold compresses to the head  Also recommended to the patient :  1  Maintain regular sleep schedule -- 2  Limit over the counter medications  (No more than 3 times a week)  -- 3  Maintain headache diary  -- 4  Limit caffeine to 1-2 cups a day or less  -- 5  Avoid dietary trigger  (list given to the patient and reviewed with them)  -- 6  Patient is to have regular frequent meals to prevent headache onset  Chief Complaint  Chief Complaint Free Text Note Form: migraines      History of Present Illness  HPI: Mr Daniel Steel is a 55 yo M with h/o migraines since age of 6 presents as follow up   He has started getting botox therapy for 2 years  His migraine frequency was 8+/month  Since botox therapy, he gets about 1-2/month  Heat usually triggers it  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  He had previously tried verapamil and topamax and since botox was working, he was taken off of them  He's doing quite well  Since last botox therapy, he had only one headache  He takes sumavel injections prn  It does work  He denies any side effects of botox  His frequency of headaches is even further declining now to one every 1-2 months  Review of Systems  Neurological ROS:   Constitutional: no fever, no chills, no recent weight gain, no recent weight loss, no complaints of feeling tired, no changes in appetite  Neurological General: no headache,-- no nausea or vomiting,-- no lightheadedness,-- no convulsions,-- no syncope-- and-- no trauma  Active Problems  1  Allergic rhinitis (477 9) (J30 9)   2  Benign familial tremor (333 1) (G25 0)   3  Bipolar disorder (296 80) (F31 9)   4  BMI 27 0-27 9,adult (V85 23) (Z68 27)   5  Bronchitis (490) (J40)   6  Chronic cough (786 2) (R05)   7  Chronic migraine (346 70) (G43 709)   8  Cough (786 2) (R05)   9  Cubital Tunnel Syndrome (354 2)   10  Depression (311) (F32 9)   11  Dysfunction of both eustachian tubes (381 81) (H69 83)   12  Hand pain, unspecified laterality   13  History of sinus surgery (V45 89) (Z98 890)   14  Hypercholesterolemia (272 0) (E78 00)   15  Laceration of scalp (873 0) (S01 01XA)   16  Migraine, unspecified, not intractable, without status migrainosus (346 90) (G43 909)   17  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 42) (F51 01)   18  Right knee pain (719 46) (M25 561)   19  Right knee sprain (844 9) (S83 91XA)   20  Screening for cardiovascular condition (V81 2) (Z13 6)   21  Screening for prostate cancer (V76 44) (Z12 5)   22  Screening for thyroid disorder (V77 0) (Z13 29)   23  Tinnitus, unspecified laterality (388 30) (H93 19)    Past Medical History  1   History of Acute atopic conjunctivitis, unspecified laterality (372 05) (H10 10)   2  History of Acute maxillary sinusitis (461 0) (J01 00)   3  History of Acute otitis media, bilateral (382 9) (H66 93)   4  History of Acute suppurative otitis media without spontaneous rupture of ear drum,   unspecified laterality   5  Acute upper respiratory infection (465 9) (J06 9)   6  History of Acute upper respiratory infection (465 9) (J06 9)   7  History of Bilateral impacted cerumen (380 4) (H61 23)   8  Contact dermatitis due to plant (692 6) (L25 5)   9  History of acute bronchitis (V12 69) (Z87 09)   10  History of acute sinusitis (V12 69) (Z87 09)   11  History of acute sinusitis (V12 69) (Z87 09)   12  History of acute sinusitis (V12 69) (Z87 09)   13  Otalgia, unspecified laterality (388 70) (H92 09)   14  Well adult on routine health check (V70 0) (Z00 00)    Surgical History  1  History of Elbow Surgery   2  History of Sinus Surgery   3  History of Tonsillectomy    Family History  Mother    1  Family history of migraine headaches (V17 2) (Z82 0)  Family History    2  Family history of Arthritis (V17 7)   3  Family history of Breast Cancer (V16 3)   4  Family history of cardiac disorder (V17 49) (Z82 49)   5  Family history of hypertension (V17 49) (Z82 49)    Social History   · Being A Social Drinker   · Daily Tea Consumption (___ Cups/Day)   ·    · Exposure to secondhand smoke (V15 89) (Z77 22)   · Marital History - Currently    · Never a smoker   · Occupation    Current Meds   1  Atorvastatin Calcium 20 MG Oral Tablet; TAKE 1 TABLET DAILY  Requested for:   74YDH3111; Last Rx:73Fgp5777 Ordered   2  Gabapentin 300 MG Oral Capsule; TAKE 1 CAPSULE 3 TIMES DAILY; Therapy: 90DXK4025 to (Evaluate:30Nov2017)  Requested for: 46Lmo9543; Last   Rx:01Sep2017 Ordered   3  LaMICtal 150 MG Oral Tablet; 2 Every Day; Therapy: 84BLR8334 to  Requested for: 39XTA5311 Recorded   4   Lexapro 20 MG Oral Tablet; 1 Every Day; Therapy: 67THW9622 to  Requested for: 90HBF3987 Recorded   5  MethylPREDNISolone 4 MG Oral Tablet; USE AS DIRECTED; Therapy: 00SBA9158 to (Last Rx:23Oct2017)  Requested for: 23Oct2017 Ordered    Allergies  1  No Known Drug Allergies    Vitals  Signs   Recorded: 87RDL1221 01:55PM   Heart Rate: 74  Systolic: 629, LUE  Diastolic: 80, LUE  Height: 5 ft 9 5 in  Weight: 182 lb   BMI Calculated: 26 49  BSA Calculated: 1 99  O2 Saturation: 99    Physical Exam    Constitutional   General appearance: No acute distress, well appearing and well nourished  Eyes   Ophthalmoscopic examination: Vision is grossly normal  Gross visual field testing by confrontation shows no abnormalities  EOMI in both eyes  Conjunctivae clear  Eyelids normal palpebral fissures equal  Orbits exhibit normal position  No discharge from the eyes  PERRL  Musculoskeletal   Gait and station: Normal gait, stance and balance  Muscle strength: Normal strength throughout  Muscle tone: No atrophy, abnormal movements, flaccidity, cogwheeling or spasticity  Neurologic   Orientation to person, place, and time: Normal     Recent and remote memory: Demonstrates normal memory  Attention span and concentration: Normal thought process and attention span  Language: Names objects, able to repeat phrases and speaks spontaneously  Fund of knowledge: Normal vocabulary with appropriate knowledge of current events and past history  2nd cranial nerve: Normal     3rd, 4th, and 6th cranial nerves: Normal     5th cranial nerve: Normal     7th cranial nerve: Normal     8th cranial nerve: Normal     9th cranial nerve: Normal     11th cranial nerve: Normal     12th cranial nerve: Normal     Sensation: Normal     Reflexes: Normal     Coordination: Normal        Future Appointments    Date/Time Provider Specialty Site   12/22/2017 09:00 AM CARROL Angel   Neurology NEUROLOGY [facility]   Electronically signed by : CARROL Neal ; Oct 30 2017  2:06PM EST                       (Author)

## 2017-12-22 ENCOUNTER — ALLSCRIPTS OFFICE VISIT (OUTPATIENT)
Dept: OTHER | Facility: OTHER | Age: 47
End: 2017-12-22

## 2017-12-23 NOTE — PROGRESS NOTES
Chief Complaint   migraines      Current Meds    1  Atorvastatin Calcium 20 MG Oral Tablet; TAKE 1 TABLET DAILY  Requested for:     02EHJ4728; Last Rx:86Drz3777 Ordered   2  Gabapentin 300 MG Oral Capsule; TAKE 1 CAPSULE 3 TIMES DAILY; Therapy: 20HSS5514 to (Evaluate:30Nov2017)  Requested for: 18Wgf3026; Last     Rx:06Jbb7037 Ordered   3  LaMICtal 150 MG Oral Tablet; 2 Every Day; Therapy: 21RCB8923 to  Requested for: 67RNM4746 Recorded   4  Lexapro 20 MG Oral Tablet; 1 Every Day; Therapy: 54XFW2989 to  Requested for: 43LJC9275 Recorded   5  MethylPREDNISolone 4 MG Oral Tablet; USE AS DIRECTED; Therapy: 91TPV6172 to (Last Rx:23Oct2017)  Requested for: 23Oct2017 Ordered    Allergies   1  No Known Drug Allergies    Vitals    Recorded: 22Dec2017 09:06AM   Heart Rate 59   Systolic 537, LUE   Diastolic 76, LUE   Height 5 ft 9 5 in   Weight 184 lb    BMI Calculated 26 78   BSA Calculated 2   O2 Saturation 98     Procedure        Risks and benefits were discussed with the patient  We discussed possible complications, including bleeding  Procedure Note:          Procedure: Headache botox injection  Indication: Chronic migraine headache  patient has been stable  no worsening of migraines  Risks were discussed with the patient  We discussed possible complications, including infection  Written consent was obtained prior to the procedure  The site was prepped with an alcohol swab  Anesthesia: No anesthesia was needed  Procedure Note:      200 u --Mml of Botox-A and 4 8cc ml of sterile saline were mixed  -- EMG guidance was not used in identifying the injection site  20 units, 2 sites unit(s) was injected into the procerus muscle  5 unit(s) was injected into the  right  muscle  5 unit(s) was injected into the  left  muscle  10 unit(s) was injected into the  right frontalis muscle  10 unit(s) was injected into the  left frontalis muscle      35 unit(s) was injected into the  right temporalis muscle  35 u, 7 sites unit(s) was injected into the  left temporalis muscle     0 unit(s) was injected into the  right occipitalis muscle     0 unit(s) was injected into the  left occipitalis muscle  15 unit(s) was injected into the  right cervical paraspinal muscle  15 unit(s) was injected into the  left cervical paraspinal muscle  15 unit(s) was injected into the  right trapezius muscle  15 unit(s) was injected into the  left trapezius muscle  A total of 180units were used  A total of 20units were discarded  Botox Lot:  Lot number: [id number] and [id number] -- Expiration date: 06/2020 and 06/2020  Patient Status:  the patient tolerated the procedure well  Follow-up in the office in 6 week(s)  Assessment   1  Chronic migraine (346 70) (G42 416)    Discussion/Summary      Kept same dose and sites as previously        Signatures    Electronically signed by : CARROL Huang ; Dec 22 2017  9:39AM EST                       (Author)

## 2017-12-26 ENCOUNTER — ALLSCRIPTS OFFICE VISIT (OUTPATIENT)
Dept: OTHER | Facility: OTHER | Age: 47
End: 2017-12-26

## 2017-12-27 NOTE — PROGRESS NOTES
Assessment   1  Otalgia, unspecified laterality (388 70) (H92 09)   2  Foreign body of right ear, initial encounter (436 2743) (T16 1XXA)   3  Hypercholesterolemia (272 0) (E78 00)   4  BMI 27 0-27 9,adult (V85 23) (Z68 27)    Plan   Dysfunction of both eustachian tubes    · MethylPREDNISolone 4 MG Oral Tablet  Hypercholesterolemia    · Atorvastatin Calcium 20 MG Oral Tablet; TAKE 1 TABLET DAILY  Otalgia, unspecified laterality    · Sulfamethoxazole-Trimethoprim 800-160 MG Oral Tablet; TAKE 1 TABLET TWICE    DAILY    Discussion/Summary   Possible side effects of new medications were reviewed with the patient/guardian today  The treatment plan was reviewed with the patient/guardian  The patient/guardian understands and agrees with the treatment plan      Provider Comments   Provider Comments:    flush right ear done, feather shown to pt has symptoms so abx if no better stable, f/u suggested but refill given  unchanged          Chief Complaint   Pt c/o right ear ache for the past days  er/c ma  History of Present Illness   HPI: few days ear sensitive draining/pus/blood   stable, sees Dr Phil Rangel chol meds, wants refill, has seen Dr Kashif Davis in past 6m, knows he needs to keep f/u for monitoring          Review of Systems        Cardiovascular: no chest pain  Neurological: no dizziness  Active Problems   1  Allergic rhinitis (477 9) (J30 9)   2  Benign familial tremor (333 1) (G25 0)   3  Bipolar disorder (296 80) (F31 9)   4  BMI 27 0-27 9,adult (V85 23) (Z68 27)   5  Chronic cough (786 2) (R05)   6  Chronic migraine (346 70) (G43 709)   7  Dysfunction of both eustachian tubes (381 81) (H69 83)   8  History of sinus surgery (V45 89) (Z98 890)   9  Hypercholesterolemia (272 0) (E78 00)   10  Migraine, unspecified, not intractable, without status migrainosus (346 90) (G43 909)   11  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 42) (F51 01)   12  Right knee sprain (844 9) (S83 91XA)   13   Screening for cardiovascular condition (V81 2) (Z13 6)   14  Screening for prostate cancer (V76 44) (Z12 5)   15  Screening for thyroid disorder (V77 0) (Z13 29)    Past Medical History   1  History of Acute atopic conjunctivitis, unspecified laterality (372 05) (H10 10)   2  History of Acute maxillary sinusitis (461 0) (J01 00)   3  History of Acute otitis media, bilateral (382 9) (H66 93)   4  History of Acute suppurative otitis media without spontaneous rupture of ear drum,     unspecified laterality   5  Acute upper respiratory infection (465 9) (J06 9)   6  History of Acute upper respiratory infection (465 9) (J06 9)   7  History of Bilateral impacted cerumen (380 4) (H61 23)   8  Contact dermatitis due to plant (692 6) (L25 5)   9  History of acute bronchitis (V12 69) (Z87 09)   10  History of acute sinusitis (V12 69) (Z87 09)   11  History of acute sinusitis (V12 69) (Z87 09)   12  History of acute sinusitis (V12 69) (Z87 09)   13  Otalgia, unspecified laterality (388 70) (H92 09)   14  Well adult on routine health check (V70 0) (Z00 00)    Family History   Mother    1  Family history of migraine headaches (V17 2) (Z82 0)  Family History    2  Family history of Arthritis (V17 7)   3  Family history of Breast Cancer (V16 3)   4  Family history of cardiac disorder (V17 49) (Z82 49)   5  Family history of hypertension (V17 49) (Z82 49)  Family History Reviewed: The family history was reviewed and updated today  Social History    · Being A Social Drinker   · Daily Tea Consumption (___ Cups/Day)   ·    · Exposure to secondhand smoke (V15 89) (Z77 22)   · Marital History - Currently    · Never a smoker   · Occupation   ·  for [address] [place] [address] [place] history was reviewed and updated today  Surgical History   1  History of Elbow Surgery   2  History of Sinus Surgery   3  History of Tonsillectomy    Current Meds    1   Atorvastatin Calcium 20 MG Oral Tablet; TAKE 1 TABLET DAILY  Requested for: 00LGM4594; Last Rx:13Edw2187 Ordered   2  Gabapentin 300 MG Oral Capsule; TAKE 1 CAPSULE 3 TIMES DAILY; Therapy: 60TTS1287 to (Evaluate:30Nov2017)  Requested for: 01Sep2017; Last     Rx:01Sep2017 Ordered   3  LaMICtal 150 MG Oral Tablet; 2 Every Day; Therapy: 99OFE6432 to  Requested for: 42RKG2237 Recorded   4  Lexapro 20 MG Oral Tablet; 1 Every Day; Therapy: 37NFG7784 to  Requested for: 36DCA6369 Recorded   5  MethylPREDNISolone 4 MG Oral Tablet; USE AS DIRECTED; Therapy: 38SAN7219 to (Last Rx:23Oct2017)  Requested for: 23Oct2017 Ordered    Allergies   1  No Known Drug Allergies    Vitals    Recorded: 82LBR9225 10:09AM   Temperature 98 4 F   Heart Rate 76   Respiration 20   Systolic 726   Diastolic 80   Height 5 ft 9 5 in   Weight 186 lb    BMI Calculated 27 07   BSA Calculated 2 01     Physical Exam        Constitutional      General appearance: No acute distress, well appearing and well nourished  Eyes      Conjunctiva and lids: No swelling, erythema, or discharge  Pupils and irises: Equal, round and reactive to light  Ears, Nose, Mouth, and Throat      External inspection of ears and nose: Normal        Otoscopic examination: Abnormal  -- TMs appear normal, a feather was seen in right ear canal       Nasal mucosa, septum, and turbinates: Normal without edema or erythema  Oropharynx: Normal with no erythema, edema, exudate or lesions  Pulmonary      Respiratory effort: No increased work of breathing or signs of respiratory distress  Auscultation of lungs: Clear to auscultation, equal breath sounds bilaterally, no wheezes, no rales, no rhonci  Cardiovascular      Auscultation of heart: Normal rate and rhythm, normal S1 and S2, without murmurs  Examination of extremities for edema and/or varicosities: Normal        Carotid pulses: Normal        Abdomen      Abdomen: Non-tender, no masses         Lymphatic      Palpation of lymph nodes in neck: No lymphadenopathy  Musculoskeletal      Gait and station: Normal        Digits and nails: Normal without clubbing or cyanosis  Skin      Skin and subcutaneous tissue: Normal without rashes or lesions  Psychiatric      Mood and affect: Normal           Procedure                    Indication: foreign body in the right ear  Procedure Note: The procedure was performed by the Provider  The ear was cleaned by using warm water irrigation  The procedure was successful  Post-Procedure:      Patient Status: the patient tolerated the procedure well  Complications: there were no complications  Follow-up as needed  Future Appointments      Date/Time Provider Specialty Site   02/01/2018 04:15 PM CARROL Hurt  Neurology NEUROLOGY [facility]   03/19/2018 09:00 AM CARROL Hurt   Neurology NEUROLOGY [facility]    Electronically signed by : Chris Salas DO; Dec 26 2017 12:11PM EST                       (Author)

## 2018-01-11 NOTE — MISCELLANEOUS
Message  CT of chest reviewed  Raza Brothers has cough with some improvement  He will continue 300mg TID x 1 month  If he improves in 1 month, then he will decrease to 300mg BID x 1 week, the 300mg x 1 week and then stop  He will contiact us in one months  Plan  Chronic cough    · Gabapentin 300 MG Oral Capsule; 1 tab at HS x 3 days, the 1 tab BID x 7 days,  then TID    Signatures   Electronically signed by : ABBY Lo;  Aug  8 2017  2:01PM EST                       (Author)

## 2018-01-11 NOTE — RESULT NOTES
Verified Results  (1) COMPREHENSIVE METABOLIC PANEL [id number] 73:17UU [facility]     Test Name Result Flag Reference   Glucose, Serum 107 mg/dL H 65-99   BUN 13 mg/dL  6-24   Creatinine, Serum 0 98 mg/dL  0 76-1 27   eGFR If NonAfricn Am 93 mL/min/1 73  >59   eGFR If Africn Am 107 mL/min/1 73  >59   BUN/Creatinine Ratio 13  9-20   ALT (SGPT) 40 IU/L  0-44   Albumin, Serum 4 6 g/dL  3 5-5 5   Globulin, Total 2 1 g/dL  1 5-4 5   A/G Ratio 2 2  1 1-2 5   **Effective March 13, 2017 the reference interval**                   for A/G Ratio will be changing to: Age                Male          Female                           0 -  7 days       1 1 - 2 3       1 1 - 2 3                           8 - 30 days       1 2 - 2 8       1 2 - 2 8                           1 -  6 months     1 3 - 3 6       1 3 - 3 6                    7 months -  5 years      1 5 - 2 6       1 5 - 2 6                              > 5 years      1 2 - 2 2       1 2 - 2 2   Bilirubin, Total 0 4 mg/dL  0 0-1 2   Alkaline Phosphatase, S 116 IU/L     AST (SGOT) 21 IU/L  0-40   Sodium, Serum 142 mmol/L  134-144   Potassium, Serum 4 3 mmol/L  3 5-5 2   Chloride, Serum 106 mmol/L     Carbon Dioxide, Total 19 mmol/L  18-29   Calcium, Serum 9 2 mg/dL  8 7-10 2   Protein, Total, Serum 6 7 g/dL  6 0-8 5     (1) LIPID PANEL, FASTING 89EWG3558 10:31AM Nicole Richter     Test Name Result Flag Reference   Cholesterol, Total 161 mg/dL  100-199   Triglycerides 134 mg/dL  0-149   HDL Cholesterol 38 mg/dL L >39   VLDL Cholesterol Vince 27 mg/dL  5-40   LDL Cholesterol Calc 96 mg/dL  0-99   T  Chol/HDL Ratio 4 2 ratio units  0 0-5 0   T  Chol/HDL Ratio                                                             Men  Women                                               1/2 Avg  Risk  3 4    3 3                                                   Avg Risk  5 0    4 4                                                2X Avg  Risk  9 6 7 1                                                3X Avg  Risk 23 4   11 0     (1) CBC/PLT/DIFF 35PSE4047 10:31AM Isabela Baptist     Test Name Result Flag Reference   WBC 5 4 x10E3/uL  3 4-10 8   RBC 5 43 x10E6/uL  4 14-5 80   Hemoglobin 15 4 g/dL  12 6-17 7   Hematocrit 44 9 %  37 5-51 0   MCV 83 fL  79-97   MCH 28 4 pg  26 6-33 0   Baso (Absolute) 0 0 x10E3/uL  0 0-0 2   Immature Granulocytes 1 %     Immature Grans (Abs) 0 0 x10E3/uL  0 0-0 1   Eos 2 %     Basos 1 %     Neutrophils (Absolute) 3 2 x10E3/uL  1 4-7 0   Lymphs (Absolute) 1 7 x10E3/uL  0 7-3 1   Monocytes(Absolute) 0 4 x10E3/uL  0 1-0 9   Eos (Absolute) 0 1 x10E3/uL  0 0-0 4   MCHC 34 3 g/dL  31 5-35 7   RDW 13 6 %  12 3-15 4   Platelets 030 I64L0/SL  150-379   Neutrophils 57 %     Lymphs 32 %     Monocytes 7 %       (LC) Cardiovascular Risk Assessment [id number] 10:31AM HotchkinElveria Cristina     Test Name Result Flag Reference   Interpretation Note     Supplement report is available  PDF Image            Discussion/Summary   Bloodwork is stable  - Dr Natalee Shah

## 2018-01-12 VITALS
HEART RATE: 72 BPM | WEIGHT: 189 LBS | RESPIRATION RATE: 16 BRPM | DIASTOLIC BLOOD PRESSURE: 74 MMHG | SYSTOLIC BLOOD PRESSURE: 110 MMHG | BODY MASS INDEX: 27.06 KG/M2 | TEMPERATURE: 97.7 F | HEIGHT: 70 IN

## 2018-01-12 VITALS
TEMPERATURE: 98.1 F | RESPIRATION RATE: 12 BRPM | HEIGHT: 70 IN | HEART RATE: 84 BPM | SYSTOLIC BLOOD PRESSURE: 138 MMHG | BODY MASS INDEX: 26.34 KG/M2 | WEIGHT: 184 LBS | OXYGEN SATURATION: 98 % | DIASTOLIC BLOOD PRESSURE: 86 MMHG

## 2018-01-12 VITALS
SYSTOLIC BLOOD PRESSURE: 116 MMHG | RESPIRATION RATE: 16 BRPM | HEIGHT: 70 IN | HEART RATE: 84 BPM | TEMPERATURE: 95.7 F | BODY MASS INDEX: 26.63 KG/M2 | DIASTOLIC BLOOD PRESSURE: 72 MMHG | WEIGHT: 186 LBS

## 2018-01-13 VITALS
DIASTOLIC BLOOD PRESSURE: 86 MMHG | BODY MASS INDEX: 26.4 KG/M2 | HEART RATE: 76 BPM | RESPIRATION RATE: 16 BRPM | WEIGHT: 184 LBS | SYSTOLIC BLOOD PRESSURE: 122 MMHG

## 2018-01-13 VITALS
DIASTOLIC BLOOD PRESSURE: 84 MMHG | BODY MASS INDEX: 26.34 KG/M2 | WEIGHT: 184 LBS | SYSTOLIC BLOOD PRESSURE: 134 MMHG | HEIGHT: 70 IN

## 2018-01-13 VITALS
DIASTOLIC BLOOD PRESSURE: 88 MMHG | SYSTOLIC BLOOD PRESSURE: 142 MMHG | HEART RATE: 96 BPM | RESPIRATION RATE: 16 BRPM | WEIGHT: 189 LBS | BODY MASS INDEX: 27.06 KG/M2 | HEIGHT: 70 IN | TEMPERATURE: 99.1 F

## 2018-01-13 VITALS
HEIGHT: 70 IN | WEIGHT: 186 LBS | TEMPERATURE: 97.7 F | RESPIRATION RATE: 18 BRPM | OXYGEN SATURATION: 96 % | SYSTOLIC BLOOD PRESSURE: 122 MMHG | BODY MASS INDEX: 26.63 KG/M2 | DIASTOLIC BLOOD PRESSURE: 78 MMHG | HEART RATE: 80 BPM

## 2018-01-13 VITALS
SYSTOLIC BLOOD PRESSURE: 121 MMHG | BODY MASS INDEX: 26.77 KG/M2 | DIASTOLIC BLOOD PRESSURE: 84 MMHG | HEIGHT: 70 IN | WEIGHT: 187 LBS

## 2018-01-14 VITALS
WEIGHT: 186 LBS | BODY MASS INDEX: 26.63 KG/M2 | HEIGHT: 70 IN | DIASTOLIC BLOOD PRESSURE: 80 MMHG | SYSTOLIC BLOOD PRESSURE: 120 MMHG

## 2018-01-14 VITALS
HEART RATE: 74 BPM | DIASTOLIC BLOOD PRESSURE: 80 MMHG | HEIGHT: 70 IN | OXYGEN SATURATION: 99 % | WEIGHT: 182 LBS | SYSTOLIC BLOOD PRESSURE: 124 MMHG | BODY MASS INDEX: 26.05 KG/M2

## 2018-01-16 NOTE — RESULT NOTES
Verified Results  * XR CHEST PA & LATERAL [id number] 12:07PM Shoaib Reddy Order Number: [id number]     Test Name Result Flag Reference   XR CHEST PA & LATERAL (Report)     CHEST      INDICATION: Productive cough for one month  COMPARISON: None     VIEWS: Frontal and lateral projections; 2 images     FINDINGS:        Cardiomediastinal silhouette appears unremarkable  The lungs are clear  No pneumothorax or pleural effusion  Visualized osseous structures appear within normal limits for the patient's age  IMPRESSION:     No active pulmonary disease         Workstation performed: [id number]     Signed by:   Williams Mckeon MD   6/16/16       Discussion/Summary   no active pulmonary disease

## 2018-01-19 ENCOUNTER — GENERIC CONVERSION - ENCOUNTER (OUTPATIENT)
Dept: OTHER | Facility: OTHER | Age: 48
End: 2018-01-19

## 2018-01-22 VITALS
DIASTOLIC BLOOD PRESSURE: 78 MMHG | WEIGHT: 185 LBS | TEMPERATURE: 97.2 F | SYSTOLIC BLOOD PRESSURE: 110 MMHG | BODY MASS INDEX: 26.48 KG/M2 | HEART RATE: 76 BPM | HEIGHT: 70 IN | RESPIRATION RATE: 18 BRPM

## 2018-01-22 VITALS
BODY MASS INDEX: 26.63 KG/M2 | HEART RATE: 76 BPM | DIASTOLIC BLOOD PRESSURE: 80 MMHG | RESPIRATION RATE: 20 BRPM | TEMPERATURE: 98.4 F | SYSTOLIC BLOOD PRESSURE: 110 MMHG | WEIGHT: 186 LBS | HEIGHT: 70 IN

## 2018-01-23 VITALS
DIASTOLIC BLOOD PRESSURE: 76 MMHG | HEART RATE: 59 BPM | SYSTOLIC BLOOD PRESSURE: 133 MMHG | HEIGHT: 70 IN | WEIGHT: 184 LBS | OXYGEN SATURATION: 98 % | BODY MASS INDEX: 26.34 KG/M2

## 2018-01-24 VITALS
HEIGHT: 70 IN | WEIGHT: 180 LBS | TEMPERATURE: 97.9 F | SYSTOLIC BLOOD PRESSURE: 124 MMHG | RESPIRATION RATE: 16 BRPM | DIASTOLIC BLOOD PRESSURE: 88 MMHG | HEART RATE: 72 BPM | BODY MASS INDEX: 25.77 KG/M2

## 2018-02-19 ENCOUNTER — OFFICE VISIT (OUTPATIENT)
Dept: NEUROLOGY | Facility: CLINIC | Age: 48
End: 2018-02-19
Payer: COMMERCIAL

## 2018-02-19 VITALS
HEIGHT: 69 IN | SYSTOLIC BLOOD PRESSURE: 110 MMHG | HEART RATE: 72 BPM | WEIGHT: 179 LBS | BODY MASS INDEX: 26.51 KG/M2 | DIASTOLIC BLOOD PRESSURE: 76 MMHG

## 2018-02-19 DIAGNOSIS — IMO0002 CHRONIC MIGRAINE: ICD-10-CM

## 2018-02-19 DIAGNOSIS — G43.009 MIGRAINE WITHOUT AURA AND WITHOUT STATUS MIGRAINOSUS, NOT INTRACTABLE: Primary | ICD-10-CM

## 2018-02-19 PROCEDURE — 99214 OFFICE O/P EST MOD 30 MIN: CPT | Performed by: PSYCHIATRY & NEUROLOGY

## 2018-02-19 RX ORDER — LAMOTRIGINE 150 MG/1
150 TABLET ORAL 2 TIMES DAILY
COMMUNITY
Start: 2011-07-13 | End: 2018-05-04 | Stop reason: SDUPTHER

## 2018-02-19 RX ORDER — ESCITALOPRAM OXALATE 20 MG/1
TABLET ORAL DAILY
COMMUNITY
Start: 2011-07-13 | End: 2018-05-04 | Stop reason: SDUPTHER

## 2018-02-19 RX ORDER — GABAPENTIN 300 MG/1
1 CAPSULE ORAL 3 TIMES DAILY
COMMUNITY
Start: 2017-07-24 | End: 2018-03-09 | Stop reason: SDUPTHER

## 2018-02-19 RX ORDER — ATORVASTATIN CALCIUM 20 MG/1
1 TABLET, FILM COATED ORAL DAILY
COMMUNITY
End: 2018-04-18 | Stop reason: SDUPTHER

## 2018-02-19 RX ORDER — VERAPAMIL HYDROCHLORIDE 120 MG/1
120 CAPSULE, EXTENDED RELEASE ORAL
Qty: 30 CAPSULE | Refills: 4 | Status: SHIPPED | OUTPATIENT
Start: 2018-02-19

## 2018-02-19 NOTE — PROGRESS NOTES
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  He had previously tried verapamil and topamax and since botox was working, he was taken off of them  He's doing quite well  He takes sumavel injections prn  It does work  He denies any side effects of botox  His frequency of headaches is even further declining now to one 2-3 months  Since last visit, he's had about 2 noticeable headaches and total of 5  He says he's had a lot of stress due to getting her kids custody  Past Medical History:   Diagnosis Date    Hyperlipidemia     Hypertension     Migraine     Psychiatric disorder      Social History     Social History    Marital status:      Spouse name: N/A    Number of children: N/A    Years of education: N/A     Occupational History    Not on file       Social History Main Topics    Smoking status: Never Smoker    Smokeless tobacco: Never Used    Alcohol use No    Drug use: No    Sexual activity: Yes     Partners: Female     Other Topics Concern    Not on file     Social History Narrative    No narrative on file     Family History   Problem Relation Age of Onset   [facility] Migraines Mother     No Known Problems Father      No Known Allergies  Current Outpatient Prescriptions on File Prior to Visit   Medication Sig Dispense Refill    [DISCONTINUED] atorvastatin (LIPITOR) 20 mg tablet Take 20 mg by mouth daily      [DISCONTINUED] escitalopram (LEXAPRO) 20 mg tablet Take 20 mg by mouth daily      [DISCONTINUED] gabapentin (NEURONTIN) 300 mg capsule Take 100 mg by mouth 2 (two) times a day      [DISCONTINUED] lamoTRIgine (LaMICtal) 200 MG tablet Take 300 mg by mouth daily      [DISCONTINUED] neomycin-bacitracin-polymyxin b (NEOSPORIN) ointment Apply topically 2 (two) times a day 15 g 0     No current facility-administered medications on file prior to visit  Review of Systems   Refer to positive review of systems in HPI     Constitutional- No fever  Eyes- No visual change  ENT- Hearing normal  CV- No chest pain  Resp- No Shortness of breath  GI- No diarrhea  - Bladder normal  MS- No Arthritis   Skin- No rash  Psych- No depression  Endo- No DM  Heme- No nodes    Vitals:    02/19/18 1438   BP: 110/76   BP Location: Left arm   Patient Position: Sitting   Pulse: 72   Weight: 81 2 kg (179 lb)   Height: 5' 9" (1 753 m)       PHYSICAL EXAM:  Appearance: No Acute Distress  Ophthalmoscopic: Disc Flat, Normal fundus  Mental status:  Orientation: Awake, Alert, and Orientedx3  Memory: Registation 3/3 Recall 3/3  Attention: normal  Knowledge: good  Language: No aphasia  Speech: No dysarthria  Cranial Nerves:  2 No Visual Defect on Confrontation, Pupils round, equal, reactive to light  3,4,6 Extraocular Movements Intact, no nystagmus  5 Facial Sensation Intact  7 No facial asymmetry  8 Intact hearing  9,10 Palate symmetric, normal gag  11 Good shoulder shrug  12 Tongue Midline  Gait: Stable  Coordination: No ataxia with finger to nose testing, and heel to shin  Sensory: Intact, Symmetric to pinprick, light touch, vibration, and joint position  Muscle Tone: Normal              Muscle exam:  Arm Right Left Leg Right Left   Deltoid 5/5 5/5 Iliopsoas 5/5 5/5   Biceps 5/5 5/5 Quads 5/5 5/5   Triceps 5/5 5/5 Hamstrings 5/5 5/5   Wrist Extension 5/5 5/5 Ankle Dorsi Flexion 5/5 5/5   Wrist Flexion 5/5 5/5 Ankle Plantar Flexion 5/5 5/5   Interossei 5/5 5/5 Ankle Eversion 5/5 5/5   APB 5/5 5/5 Ankle Inversion 5/5 5/5       Reflexes   RJ BJ TJ KJ AJ Plantars Menard's   Right 2+ 2+ 2+ 2+ 2+ Downgoing Not present   Left 2+ 2+ 2+ 2+ 2+ Downgoing Not present     Personal review of  Labs:                  Diagnoses and all orders for this visit:    Migraine without aura and without status migrainosus, not intractable  -     verapamil (VERELAN PM) 120 MG 24 hr capsule; Take 1 capsule (120 mg total) by mouth daily at bedtime    Chronic migraine        1  Migraine without aura and without status migrainosus, not intractable  verapamil (VERELAN PM) 120 MG 24 hr capsule   2  Chronic migraine           Patient has been stable but I feel there is high frequency of his headaches  I would like him to stay on one preventive medication to ideally get only 1-2/month  Resume prn imitrex  Counseling Documentation:  The patient and/or patient's family were  counseled regarding diagnostic results  Instructions for management,risk factor reductions,prognosis of disease were discussed  Patient and family were educated regarding impressions,risks and benefits of treatment options,importance of compliance with treatment        Total time of encounter:  30 min  More than 50% of the time was used in counseling and/or coordination of care  Extent of counseling and/or coordination of care        Socrates Gonzalez MD  [facility]  [address] [place] [address],[address]  [place] [address]  [phone]

## 2018-03-09 ENCOUNTER — OFFICE VISIT (OUTPATIENT)
Dept: PULMONOLOGY | Facility: MEDICAL CENTER | Age: 48
End: 2018-03-09
Payer: COMMERCIAL

## 2018-03-09 VITALS
SYSTOLIC BLOOD PRESSURE: 138 MMHG | RESPIRATION RATE: 12 BRPM | HEIGHT: 70 IN | TEMPERATURE: 98 F | BODY MASS INDEX: 26.05 KG/M2 | WEIGHT: 182 LBS | OXYGEN SATURATION: 99 % | DIASTOLIC BLOOD PRESSURE: 96 MMHG | HEART RATE: 64 BPM

## 2018-03-09 DIAGNOSIS — R05.3 CHRONIC COUGH: Primary | ICD-10-CM

## 2018-03-09 DIAGNOSIS — Z98.890 HISTORY OF SINUS SURGERY: ICD-10-CM

## 2018-03-09 DIAGNOSIS — R05.9 COUGH: ICD-10-CM

## 2018-03-09 PROCEDURE — 99213 OFFICE O/P EST LOW 20 MIN: CPT | Performed by: INTERNAL MEDICINE

## 2018-03-09 PROCEDURE — 94010 BREATHING CAPACITY TEST: CPT | Performed by: INTERNAL MEDICINE

## 2018-03-09 RX ORDER — GABAPENTIN 300 MG/1
300 CAPSULE ORAL 3 TIMES DAILY
Qty: 90 CAPSULE | Refills: 5 | Status: SHIPPED | OUTPATIENT
Start: 2018-03-09 | End: 2019-06-04 | Stop reason: SDUPTHER

## 2018-03-09 NOTE — ASSESSMENT & PLAN NOTE
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 or what is also known sensory neuropathic cough or chronic reflex hypersensitivity  I did prescribe gabapentin 300 mg t i d  He was started at twice a day for the 1st week  He will call our office in 3-4 weeks to let us know if his cough has resolved and her with thus stay on this medication diffuse doing well    Spirometry today centrally normal   Lung volumes are normal and there is borderline decrease in obstructive index  Clinically does not appear to have asthma  No wheezing and did not respond to prednisone or Advair in the past   No prior history of asthma also he had good response with resolution of his cough with gabapentin      Follow-up in 1 year

## 2018-03-09 NOTE — PROGRESS NOTES
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 or what is also known sensory neuropathic cough or chronic reflex hypersensitivity  I did prescribe gabapentin 300 mg t i d  He was started at twice a day for the 1st week  He will call our office in 3-4 weeks to let us know if his cough has resolved and her with thus stay on this medication diffuse doing well    Spirometry today centrally normal   Lung volumes are normal and there is borderline decrease in obstructive index  Clinically does not appear to have asthma  No wheezing and did not respond to prednisone or Advair in the past   No prior history of asthma also he had good response with resolution of his cough with gabapentin    Follow-up in 1 year         History of sinus surgery      Other Visit Diagnoses     Cough        Relevant Medications    gabapentin (NEURONTIN) 300 mg capsule    Other Relevant Orders    POCT spirometry (Completed)            Return in about 1 year (around 3/9/2019)  All questions are answered to the patient's satisfaction and understanding  He verbalizes understanding  He is encouraged to call with any further questions or concerns  Portions of the record may have been created with voice recognition software  Occasional wrong word or "sound a like" substitutions may have occurred due to the inherent limitations of voice recognition software    Read the chart carefully and recognize, using context, where substitutions have occurred  ______________________________________________________________________    Chief Complaint:   Chief Complaint   Patient presents with    Follow-up    Cough     nauseas       Patient ID: Yesica Morris is a 53 y o  y o  male has a past medical history of Hyperlipidemia; Hypertension; Migraine; and Psychiatric disorder  3/9/2018  HPI     Yesica Morris has had a chronic cough for well over 1 year  He has tried Advair inhaler in the past and prednisone without any improvement  He is not have any shortness of breath or wheezing  He denies any postnasal drainage or any GERD symptoms  I did prescribe him gabapentin and a dose of 300 mg t i d  his cough resolved  He has been out of the gabapentin now for over 2 weeks and now after several days his cough is returned  This is a nonproductive cough and occurs throughout the day  He also has history of migraine headache  He is not having any shortness of breath  Yesica Morris does work as a  for [employer]  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  He states in the past paper  He has worked about 120 hours and is tired because of that  He has no history of asthma  He did have sinus surgery in the past   He is not having any postnasal drainage  He did have CT sinus imaging and CT of the chest done in August 2017 both were unremarkable  His diagnosis would be cough hypersensitivity syndrome or another in for would be sensory neuropathic cough or chronic reflex hypersensitivity  Gabapentin has found to help with chronic cough through neuromodulation      Review of Systems   Constitutional:        Has had persistent nonproductive cough for the last 1-1 5 years  Cough is nonproductive  No fever, chills or sinus pressure   HENT: Negative for congestion, postnasal drip, rhinorrhea and sinus pressure  Eyes: Negative for discharge and itching  Respiratory: Positive for cough  Negative for shortness of breath and wheezing  Cardiovascular: Negative for chest pain and leg swelling  Gastrointestinal: Negative for abdominal pain and nausea  Endocrine: Negative for polydipsia and polyphagia  Genitourinary: Negative for dysuria  Musculoskeletal: Negative for joint swelling and myalgias  Skin: Negative for rash  Neurological: Negative for dizziness and light-headedness  Psychiatric/Behavioral: Negative for confusion  Smoking history: He reports that he has never smoked  He has never used smokeless tobacco     The following portions of the patient's history were reviewed and updated as appropriate: allergies, current medications, past family history, past medical history, past social history, past surgical history and problem list       There is no immunization history on file for this patient  Current Outpatient Prescriptions   Medication Sig Dispense Refill    atorvastatin (LIPITOR) 20 mg tablet Take 1 tablet by mouth daily      escitalopram (LEXAPRO) 20 mg tablet Take by mouth daily      lamoTRIgine (LAMICTAL) 150 MG tablet Take by mouth daily      verapamil (VERELAN PM) 120 MG 24 hr capsule Take 1 capsule (120 mg total) by mouth daily at bedtime 30 capsule 4    gabapentin (NEURONTIN) 300 mg capsule Take 1 capsule (300 mg total) by mouth 3 (three) times a day 90 capsule 5     No current facility-administered medications for this visit  Allergies: Patient has no known allergies  Objective:  Vitals:    03/09/18 0809   BP: 138/96   BP Location: Right arm   Patient Position: Sitting   Cuff Size: Adult   Pulse: 64   Resp: 12   Temp: 98 °F (36 7 °C)   TempSrc: Oral   SpO2: 99%   Weight: 82 6 kg (182 lb)   Height: 5' 10" (1 778 m)   Oxygen Therapy  SpO2: 99 %    Wt Readings from Last 3 Encounters:   03/09/18 82 6 kg (182 lb)   02/19/18 81 2 kg (179 lb)   01/19/18 81 6 kg (180 lb)     Body mass index is 26 11 kg/m²      Physical Exam   Constitutional: He is oriented to person, place, and time  Patient is awake, alert, no distress  He appears well-developed and well-nourished   HENT:   Head: Normocephalic  Nose: Nose normal    Mouth/Throat: Oropharynx is clear and moist    Eyes: Conjunctivae are normal  No scleral icterus  Neck: Neck supple  No JVD present  No tracheal deviation present  Cardiovascular: Normal rate, regular rhythm and normal heart sounds  Pulmonary/Chest: Effort normal    Lung sounds are clear to auscultation  No wheezes, crackles or rhonchi   Abdominal: Soft  He exhibits no distension  There is no tenderness  There is no guarding  Musculoskeletal: He exhibits no edema  Lymphadenopathy:     He has no cervical adenopathy  Neurological: He is alert and oriented to person, place, and time  Skin: Skin is warm  No rash noted  No erythema  Psychiatric: He has a normal mood and affect   His behavior is normal  Thought content normal        Office Spirometry Results:  Done today  FVC - 4 75 L   109%  FEV1 - 3 28 L   96%  FEV1/FVC%   - 69%    Lung volumes are normal   Obstructive index is borderline decreased

## 2018-03-26 ENCOUNTER — OFFICE VISIT (OUTPATIENT)
Dept: NEUROLOGY | Facility: CLINIC | Age: 48
End: 2018-03-26
Payer: COMMERCIAL

## 2018-03-26 VITALS
BODY MASS INDEX: 25.91 KG/M2 | HEIGHT: 70 IN | HEART RATE: 69 BPM | WEIGHT: 181 LBS | SYSTOLIC BLOOD PRESSURE: 108 MMHG | DIASTOLIC BLOOD PRESSURE: 68 MMHG

## 2018-03-26 DIAGNOSIS — G43.709 CHRONIC MIGRAINE WITHOUT AURA WITHOUT STATUS MIGRAINOSUS, NOT INTRACTABLE: Primary | ICD-10-CM

## 2018-03-26 PROCEDURE — 64615 CHEMODENERV MUSC MIGRAINE: CPT | Performed by: PSYCHIATRY & NEUROLOGY

## 2018-03-26 NOTE — PROGRESS NOTES
Chemodenervation  Date/Time: 3/26/2018 9:38 AM  Performed by: Brittany Collins  Authorized by: Luke Myers details:     Preparation: Patient was prepped and draped in usual sterile fashion      Prepped With: Alcohol    Anesthesia (see MAR for exact dosages): Anesthesia method:  None  Procedure details:     Position:  Upright  Botox:     Botox Type:  Type A    mL's of Botulinum Toxin:  180    Final Concentration per CC:  50 units    Medication Administration:  200 Units Botulinum Toxin Type A 200 units  Procedures:     Botox Procedures: chronic headache      Indications: migraines    Total Units:     Total units used:  180    Total units discarded:  20  Post-procedure details:     Chemodenervation:  Chronic migraine    Facial Nerve Location[de-identified]  Bilateral facial nerve    Patient tolerance of procedure: Tolerated well, no immediate complications        Procedure Note:      200 u --Mml of Botox-A and 4 8cc ml of sterile saline were mixed  -- EMG guidance was not used in identifying the injection site  Risks were discussed with the patient  We discussed possible complications, including infection  Written consent was obtained prior to the procedure    Injection location:     20 units, 2 sites unit(s) was injected into the procerus muscle  5 unit(s) was injected into the  right  muscle  5 unit(s) was injected into the  left  muscle  10 unit(s) was injected into the  right frontalis muscle  10 unit(s) was injected into the  left frontalis muscle  35 unit(s) was injected into the  right temporalis muscle  35 u, 7 sites unit(s) was injected into the  left temporalis muscle     0 unit(s) was injected into the  right occipitalis muscle     0 unit(s) was injected into the  left occipitalis muscle  15 unit(s) was injected into the  right cervical paraspinal muscle  15 unit(s) was injected into the  left cervical paraspinal muscle      15 unit(s) was injected into the  right trapezius muscle  15 unit(s) was injected into the  left trapezius muscle  A total of 180units were used  A total of 20units were discarded  Follow-up in the office in 6 week(s)           CARROL Horne

## 2018-04-18 DIAGNOSIS — Z13.6 SCREENING FOR CARDIOVASCULAR CONDITION: Primary | ICD-10-CM

## 2018-04-18 DIAGNOSIS — E78.00 HYPERCHOLESTEROLEMIA: ICD-10-CM

## 2018-04-18 RX ORDER — ATORVASTATIN CALCIUM 20 MG/1
20 TABLET, FILM COATED ORAL DAILY
Qty: 30 TABLET | Refills: 0 | Status: SHIPPED | OUTPATIENT
Start: 2018-04-18 | End: 2018-05-17 | Stop reason: SDUPTHER

## 2018-04-18 NOTE — TELEPHONE ENCOUNTER
I do not see the last time I have seen pt for chronic conditions  Pt should sched a physical and get his labs BEFORE the appt  I will place orders in chart  I will do one refill of this med in the emantime    Please inform

## 2018-04-19 NOTE — TELEPHONE ENCOUNTER
Spoke to pt aware  Made CPE for 4/30/18  Aware orders up front for      no further action is required  af/rma

## 2018-04-24 LAB
ALBUMIN SERPL-MCNC: 4.1 G/DL (ref 3.5–5.5)
ALBUMIN/GLOB SERPL: 1.9 {RATIO} (ref 1.2–2.2)
ALP SERPL-CCNC: 75 IU/L (ref 39–117)
ALT SERPL-CCNC: 28 IU/L (ref 0–44)
AMBIG ABBREV DEFAULT: NORMAL
AST SERPL-CCNC: 16 IU/L (ref 0–40)
BILIRUB SERPL-MCNC: 0.5 MG/DL (ref 0–1.2)
BUN SERPL-MCNC: 13 MG/DL (ref 6–24)
BUN/CREAT SERPL: 13 (ref 9–20)
CALCIUM SERPL-MCNC: 8.9 MG/DL (ref 8.7–10.2)
CHLORIDE SERPL-SCNC: 102 MMOL/L (ref 96–106)
CHOLEST SERPL-MCNC: 125 MG/DL (ref 100–199)
CO2 SERPL-SCNC: 26 MMOL/L (ref 18–29)
CREAT SERPL-MCNC: 1 MG/DL (ref 0.76–1.27)
GLOBULIN SER-MCNC: 2.2 G/DL (ref 1.5–4.5)
GLUCOSE SERPL-MCNC: 103 MG/DL (ref 65–99)
HDLC SERPL-MCNC: 38 MG/DL
LDLC SERPL CALC-MCNC: 78 MG/DL (ref 0–99)
MICRODELETION SYND BLD/T FISH: NORMAL
POTASSIUM SERPL-SCNC: 4.3 MMOL/L (ref 3.5–5.2)
PROT SERPL-MCNC: 6.3 G/DL (ref 6–8.5)
SL AMB EGFR AFRICAN AMERICAN: 103 ML/MIN/1.73
SL AMB EGFR NON AFRICAN AMERICAN: 89 ML/MIN/1.73
SODIUM SERPL-SCNC: 141 MMOL/L (ref 134–144)
TRIGL SERPL-MCNC: 43 MG/DL (ref 0–149)

## 2018-05-04 ENCOUNTER — OFFICE VISIT (OUTPATIENT)
Dept: FAMILY MEDICINE CLINIC | Facility: CLINIC | Age: 48
End: 2018-05-04
Payer: COMMERCIAL

## 2018-05-04 VITALS
BODY MASS INDEX: 26.96 KG/M2 | SYSTOLIC BLOOD PRESSURE: 112 MMHG | HEART RATE: 78 BPM | DIASTOLIC BLOOD PRESSURE: 76 MMHG | HEIGHT: 69 IN | WEIGHT: 182 LBS | RESPIRATION RATE: 16 BRPM | TEMPERATURE: 99.6 F

## 2018-05-04 DIAGNOSIS — E78.00 HYPERCHOLESTEROLEMIA: ICD-10-CM

## 2018-05-04 DIAGNOSIS — G43.709 CHRONIC MIGRAINE WITHOUT AURA WITHOUT STATUS MIGRAINOSUS, NOT INTRACTABLE: ICD-10-CM

## 2018-05-04 DIAGNOSIS — F31.32 BIPOLAR AFFECTIVE DISORDER, CURRENTLY DEPRESSED, MODERATE (HCC): ICD-10-CM

## 2018-05-04 DIAGNOSIS — Z00.00 WELL ADULT EXAM: Primary | ICD-10-CM

## 2018-05-04 PROBLEM — R05.3 CHRONIC COUGH: Status: RESOLVED | Noted: 2018-03-09 | Resolved: 2018-05-04

## 2018-05-04 PROBLEM — Z98.890 HISTORY OF SINUS SURGERY: Status: RESOLVED | Noted: 2018-03-09 | Resolved: 2018-05-04

## 2018-05-04 PROBLEM — H52.201 ASTIGMATISM OF RIGHT EYE: Status: ACTIVE | Noted: 2018-05-04

## 2018-05-04 PROCEDURE — 99396 PREV VISIT EST AGE 40-64: CPT | Performed by: FAMILY MEDICINE

## 2018-05-04 RX ORDER — LAMOTRIGINE 150 MG/1
150 TABLET ORAL 2 TIMES DAILY
Refills: 0
Start: 2018-05-04

## 2018-05-04 RX ORDER — ESCITALOPRAM OXALATE 20 MG/1
20 TABLET ORAL DAILY
Refills: 0
Start: 2018-05-04

## 2018-05-04 NOTE — PROGRESS NOTES
FAMILY PRACTICE HEALTH MAINTENANCE OFFICE VISIT  [facility]    NAME: Val Alanis  AGE: 53 y o  SEX: male  : 1970     DATE: 2018    Assessment and Plan     Problem List Items Addressed This Visit     Chronic migraine without aura without status migrainosus, not intractable    Relevant Medications    escitalopram (LEXAPRO) 20 mg tablet    lamoTRIgine (LAMICTAL) 150 MG tablet    Hypercholesterolemia    Relevant Orders    Comprehensive metabolic panel    Lipid Panel with Direct LDL reflex    Bipolar affective disorder, currently depressed, moderate (HCC)    Relevant Medications    escitalopram (LEXAPRO) 20 mg tablet    lamoTRIgine (LAMICTAL) 150 MG tablet      Other Visit Diagnoses     Well adult exam    -  Primary            · Patient Counseling:   · Nutrition: Stressed importance of a well balanced diet, moderation of sodium/saturated fat, caloric balance and sufficient intake of fiber  · Exercise: Stressed the importance of regular exercise with a goal of 150 minutes per week  · Dental Health: Discussed daily flossing and brushing and regular dental visits   · Sexuality: Discussed sexually transmitted infections, use of condoms and prevention of unintended pregnancy  · Alcohol Use:  Recommended moderation of alcohol intake  · Injury Prevention: Discussed Safety Belts, Safety Helmets, and Smoke Detectors    · Immunizations reviewed  yes  · Discussed benefits of screening yes  · Discussed the patient's BMI with him  The BMI is above average; BMI management plan is completed     Return in about 6 months (around 2018) for Recheck          Chief Complaint     Chief Complaint   Patient presents with    Physical Exam     af/rma        History of Present Illness     Pt is here for a full physical  Dr Brady Cavanaugh did his botos injections for his HA        Well Adult Physical   Patient here for a comprehensive physical exam       Diet and Physical Activity  Diet: well balanced diet  Weight concerns: Patient is overweight (BMI 25 0-29  9)  Exercise: daily      Depression Screen  PHQ-9 Depression Screening    PHQ-9:    Frequency of the following problems over the past two weeks:       Little interest or pleasure in doing things:  0 - not at all  Feeling down, depressed, or hopeless:  0 - not at all  PHQ-2 Score:  0          General Health  Hearing: Normal:  bilateral  Vision: wears contacts  Dental: regular dental visits    Reproductive Health        The following portions of the patient's history were reviewed and updated as appropriate: allergies, current medications, past family history, past medical history, past social history, past surgical history and problem list     Review of Systems     Review of Systems   Constitutional: Negative for activity change, appetite change, chills, diaphoresis, fatigue, fever and unexpected weight change  HENT: Negative for congestion, dental problem, ear pain, mouth sores, sinus pain, sinus pressure, sore throat and trouble swallowing  Eyes: Negative for photophobia, discharge and itching  Respiratory: Negative for apnea, chest tightness and shortness of breath  Cardiovascular: Negative for chest pain, palpitations and leg swelling  Gastrointestinal: Negative for abdominal distention, abdominal pain, blood in stool, nausea and vomiting  Endocrine: Negative for cold intolerance, heat intolerance, polydipsia, polyphagia and polyuria  Genitourinary: Negative for difficulty urinating  Musculoskeletal: Negative for arthralgias  Skin: Negative for color change and wound  Neurological: Negative for dizziness, syncope, speech difficulty and headaches  Hematological: Negative for adenopathy  Psychiatric/Behavioral: Negative for agitation and behavioral problems         Past Medical History     Past Medical History:   Diagnosis Date    Cubital tunnel syndrome     last assessed 1/6/14    Depression 11/07/2005    Hyperlipidemia  Hypertension     Migraine     Psychiatric disorder        Past Surgical History     Past Surgical History:   Procedure Laterality Date    ELBOW ARTHROPLASTY Bilateral     right elbow reconstruction surgery and transposition surgery of the left elbow    KNEE ARTHROCENTESIS      SHOULDER ARTHROPLASTY      SINUS SURGERY      with bone spur removed in uvulectomy done  Had yolanda LUGO at the time, Dr Robby Doherty ENT    TONSILLECTOMY      tonsils removed and also removal of uvula, then a few years ago by Dr Robby Doherty  At that time he was having frequent sinus and throat infections, last assessed 12/19/16       Social History     Social History     Social History    Marital status:      Spouse name: N/A    Number of children: N/A    Years of education: N/A     Occupational History    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 [employer]      Social History Main Topics    Smoking status: Never Smoker    Smokeless tobacco: Never Used      Comment: exposure to second hand smoke    Alcohol use No    Drug use: No      Comment: social per [employer]    Sexual activity: Yes     Partners: Female     Other Topics Concern    None     Social History Narrative    Daily tea consumption     per [employer]           Family History     Family History   Problem Relation Age of Onset   Janis [place] Migraines Mother     Depression Mother     No Known Problems Father     Arthritis Family     Breast cancer Family     Heart disease Family      cardiac disorder    Hypertension Family     Breast cancer Sister     Asperger's syndrome Son        Current Medications       Current Outpatient Prescriptions:     atorvastatin (LIPITOR) 20 mg tablet, Take 1 tablet (20 mg total) by mouth daily, Disp: 30 tablet, Rfl: 0    escitalopram (LEXAPRO) 20 mg tablet, Take 1 tablet (20 mg total) by mouth daily, Disp: , Rfl: 0    gabapentin (NEURONTIN) 300 mg capsule, Take 1 capsule (300 mg total) by mouth 3 (three) times a day, Disp: 90 capsule, Rfl: 5    lamoTRIgine (LAMICTAL) 150 MG tablet, Take 1 tablet (150 mg total) by mouth 2 (two) times a day, Disp: , Rfl: 0    verapamil (VERELAN PM) 120 MG 24 hr capsule, Take 1 capsule (120 mg total) by mouth daily at bedtime, Disp: 30 capsule, Rfl: 4     Allergies     No Known Allergies    Objective     /76   Pulse 78   Temp 99 6 °F (37 6 °C)   Resp 16   Ht 5' 8 5" (1 74 m)   Wt 82 6 kg (182 lb)   BMI 27 27 kg/m²      Physical Exam   Constitutional: He appears well-developed and well-nourished  No distress  HENT:   Head: Normocephalic and atraumatic  Right Ear: External ear normal    Left Ear: External ear normal    Nose: Nose normal    Mouth/Throat: Oropharynx is clear and moist  No oropharyngeal exudate  Eyes: EOM are normal  Pupils are equal, round, and reactive to light  Right eye exhibits no discharge  Left eye exhibits no discharge  No scleral icterus  Neck: No thyromegaly present  Cardiovascular: Normal rate and normal heart sounds  No murmur heard  Pulmonary/Chest: Effort normal and breath sounds normal  No respiratory distress  He has no wheezes  Abdominal: Soft  Bowel sounds are normal  He exhibits no distension and no mass  There is no tenderness  There is no rebound and no guarding  Genitourinary: Prostate normal    Musculoskeletal: Normal range of motion  Neurological: He is alert  He displays normal reflexes  Coordination normal    Skin: Skin is warm and dry  No rash noted  He is not diaphoretic  No erythema  Psychiatric: He has a normal mood and affect  His behavior is normal    Nursing note and vitals reviewed           Visual Acuity Screening    Right eye Left eye Both eyes   Without correction: 20/20 20/20 20/20   With correction:          Health Maintenance     Health Maintenance   Topic Date Due    HIV SCREENING  1970    Depression Screening PHQ-9  03/09/1983    INFLUENZA VACCINE  09/01/2018    DTaP,Tdap,and Td Vaccines (2 - Td) 02/07/2027     Immunization History Administered Date(s) Administered    Tdap 02/07/2017     Recent Results (from the past 672 hour(s))   Comprehensive metabolic panel    Collection Time: 04/23/18  7:30 AM   Result Value Ref Range    SL AMB GLUCOSE 103 (H) 65 - 99 mg/dL    BUN 13 6 - 24 mg/dL    Creatinine, Serum 1 00 0 76 - 1 27 mg/dL    eGFR Non  89 >59 mL/min/1 73    SL AMB EGFR AFRICAN AMERICAN 103 >59 mL/min/1 73    SL AMB BUN/CREATININE RATIO 13 9 - 20    SL AMB SODIUM 141 134 - 144 mmol/L    SL AMB POTASSIUM 4 3 3 5 - 5 2 mmol/L    SL AMB CHLORIDE 102 96 - 106 mmol/L    SL AMB CARBON DIOXIDE 26 18 - 29 mmol/L    CALCIUM 8 9 8 7 - 10 2 mg/dL    SL AMB PROTEIN, TOTAL 6 3 6 0 - 8 5 g/dL    Serum Albumin 4 1 3 5 - 5 5 g/dL    Globulin, Total 2 2 1 5 - 4 5 g/dL    SL AMB ALBUMIN/GLOBULIN RATIO 1 9 1 2 - 2 2    SL AMB BILIRUBIN, TOTAL 0 5 0 0 - 1 2 mg/dL    Alk Phos Isoenzymes 75 39 - 117 IU/L    SL AMB AST 16 0 - 40 IU/L    SL AMB ALT 28 0 - 44 IU/L   Lipid panel    Collection Time: 04/23/18  7:30 AM   Result Value Ref Range    Cholesterol, Total 125 100 - 199 mg/dL    Triglycerides 43 0 - 149 mg/dL    SL AMB HDL CHOLESTEROL 38 (L) >39 mg/dL    SL AMB LDL-CHOLESTEROL 78 0 - 99 mg/dL   Shirlyn Cage Default    Collection Time: 04/23/18  7:30 AM   Result Value Ref Range    AMBIG ABBREV DEFAULT Comment    Cardiovascular Report    Collection Time: 04/23/18  7:30 AM   Result Value Ref Range    SL AMB INTERPRETATION Note        DO CECIL Zimmer [facility]

## 2018-05-07 ENCOUNTER — OFFICE VISIT (OUTPATIENT)
Dept: OBGYN CLINIC | Facility: CLINIC | Age: 48
End: 2018-05-07
Payer: COMMERCIAL

## 2018-05-07 ENCOUNTER — APPOINTMENT (OUTPATIENT)
Dept: RADIOLOGY | Facility: CLINIC | Age: 48
End: 2018-05-07
Payer: COMMERCIAL

## 2018-05-07 ENCOUNTER — OFFICE VISIT (OUTPATIENT)
Dept: NEUROLOGY | Facility: CLINIC | Age: 48
End: 2018-05-07
Payer: COMMERCIAL

## 2018-05-07 VITALS
BODY MASS INDEX: 26.66 KG/M2 | WEIGHT: 180 LBS | SYSTOLIC BLOOD PRESSURE: 120 MMHG | HEIGHT: 69 IN | HEART RATE: 81 BPM | DIASTOLIC BLOOD PRESSURE: 82 MMHG

## 2018-05-07 VITALS — BODY MASS INDEX: 27.7 KG/M2 | HEIGHT: 69 IN | WEIGHT: 187 LBS

## 2018-05-07 DIAGNOSIS — G43.709 CHRONIC MIGRAINE WITHOUT AURA WITHOUT STATUS MIGRAINOSUS, NOT INTRACTABLE: Primary | ICD-10-CM

## 2018-05-07 DIAGNOSIS — M25.511 RIGHT SHOULDER PAIN, UNSPECIFIED CHRONICITY: Primary | ICD-10-CM

## 2018-05-07 DIAGNOSIS — M75.21 BICEPS TENDINITIS OF RIGHT UPPER EXTREMITY: ICD-10-CM

## 2018-05-07 DIAGNOSIS — M25.511 RIGHT SHOULDER PAIN, UNSPECIFIED CHRONICITY: ICD-10-CM

## 2018-05-07 PROCEDURE — 73030 X-RAY EXAM OF SHOULDER: CPT

## 2018-05-07 PROCEDURE — 99214 OFFICE O/P EST MOD 30 MIN: CPT | Performed by: ORTHOPAEDIC SURGERY

## 2018-05-07 PROCEDURE — 99214 OFFICE O/P EST MOD 30 MIN: CPT | Performed by: PSYCHIATRY & NEUROLOGY

## 2018-05-07 NOTE — PROGRESS NOTES
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  He had previously tried verapamil and topamax and since botox was working, he was taken off of them  He's doing quite well  He takes sumavel injections prn  It does work  He denies any side effects of botox  His frequency of headaches is even further declining now to one 2-3 months  Since his last visit, 6 weeks ago, he's had one noticeable migraine  He is still dealing with issues at home but says it is what it is       Past Medical History:   Diagnosis Date    Cubital tunnel syndrome     last assessed 1/6/14    Depression 11/07/2005    Hyperlipidemia     Hypertension     Migraine     Psychiatric disorder      Social History     Social History    Marital status:      Spouse name: N/A    Number of children: N/A    Years of education: N/A     Occupational History    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 [email or web address]      Social History Main Topics    Smoking status: Never Smoker    Smokeless tobacco: Never Used      Comment: exposure to second hand smoke    Alcohol use No    Drug use: No      Comment: social per Allscripts    Sexual activity: Yes     Partners: Female     Other Topics Concern    Not on file     Social History Narrative    Daily tea consumption     per [employer]script[employer]         Family History   Problem Relation Age of Onset   Polo Liaos Migraines Mother     Depression Mother     No Known Problems Father     Arthritis Family     Breast cancer Family     Heart disease Family      cardiac disorder    Hypertension Family     Breast cancer Sister     Asperger's syndrome Son      No Known Allergies  Current Outpatient Prescriptions on File Prior to Visit   Medication Sig Dispense Refill    atorvastatin (LIPITOR) 20 mg tablet Take 1 tablet (20 mg total) by mouth daily 30 tablet 0    escitalopram (LEXAPRO) 20 mg tablet Take 1 tablet (20 mg total) by mouth daily  0    gabapentin (NEURONTIN) 300 mg capsule Take 1 capsule (300 mg total) by mouth 3 (three) times a day 90 capsule 5    lamoTRIgine (LAMICTAL) 150 MG tablet Take 1 tablet (150 mg total) by mouth 2 (two) times a day  0    verapamil (VERELAN PM) 120 MG 24 hr capsule Take 1 capsule (120 mg total) by mouth daily at bedtime 30 capsule 4     No current facility-administered medications on file prior to visit  Review of Systems   Refer to positive review of systems in HPI     Constitutional- No fever  Eyes- No visual change  ENT- Hearing normal  CV- No chest pain  Resp- No Shortness of breath  GI- No diarrhea  - Bladder normal  MS- No Arthritis   Skin- No rash  Psych- No depression  Endo- No DM  Heme- No nodes    Vitals:    05/07/18 1322   BP: 120/82   BP Location: Left arm   Patient Position: Sitting   Pulse: 81   Weight: 81 6 kg (180 lb)   Height: 5' 9" (1 753 m)       PHYSICAL EXAM:  Appearance: No Acute Distress  Ophthalmoscopic: Disc Flat, Normal fundus  Mental status:  Orientation: Awake, Alert, and Orientedx3  Memory: Registation 3/3 Recall 3/3  Attention: normal  Knowledge: good  Language: No aphasia  Speech: No dysarthria  Cranial Nerves:  2 No Visual Defect on Confrontation, Pupils round, equal, reactive to light  3,4,6 Extraocular Movements Intact, no nystagmus  5 Facial Sensation Intact  7 No facial asymmetry  8 Intact hearing  9,10 Palate symmetric, normal gag  11 Good shoulder shrug  12 Tongue Midline  Gait: Stable  Coordination: No ataxia with finger to nose testing, and heel to shin  Sensory: Intact, Symmetric to pinprick, light touch, vibration, and joint position  Muscle Tone: Normal Muscle exam:  Arm Right Left Leg Right Left   Deltoid 5/5 5/5 Iliopsoas 5/5 5/5   Biceps 5/5 5/5 Quads 5/5 5/5   Triceps 5/5 5/5 Hamstrings 5/5 5/5   Wrist Extension 5/5 5/5 Ankle Dorsi Flexion 5/5 5/5   Wrist Flexion 5/5 5/5 Ankle Plantar Flexion 5/5 5/5   Interossei 5/5 5/5 Ankle Eversion 5/5 5/5   APB 5/5 5/5 Ankle Inversion 5/5 5/5       Reflexes   RJ BJ TJ KJ AJ Plantars Menard's   Right 2+ 2+ 2+ 2+ 2+ Downgoing Not present   Left 2+ 2+ 2+ 2+ 2+ Downgoing Not present     Personal review of  Labs:                  Diagnoses and all orders for this visit:    Migraine without aura and without status migrainosus, not intractable  -     verapamil (VERELAN PM) 120 MG 24 hr capsule; Take 1 capsule (120 mg total) by mouth daily at bedtime    Chronic migraine        1  Chronic migraine without aura without status migrainosus, not intractable  SUMAtriptan Succinate 6 MG/0 5ML SOTJ         Patient has been stable  Will resume daily verapamil and botox sessions once monthly  Resume prn imitrex  Counseling Documentation:  The patient and/or patient's family were  counseled regarding diagnostic results  Instructions for management,risk factor reductions,prognosis of disease were discussed  Patient and family were educated regarding impressions,risks and benefits of treatment options,importance of compliance with treatment        Total time of encounter:  30 min  More than 50% of the time was used in counseling and/or coordination of care  Extent of counseling and/or coordination of care        Luis Carlos Sharma MD  [facility]  [address] [place] [address],[address]  [place] [address]  [phone]

## 2018-05-17 DIAGNOSIS — E78.00 HYPERCHOLESTEROLEMIA: ICD-10-CM

## 2018-05-17 RX ORDER — ATORVASTATIN CALCIUM 20 MG/1
TABLET, FILM COATED ORAL
Qty: 90 TABLET | Refills: 1 | Status: SHIPPED | OUTPATIENT
Start: 2018-05-17 | End: 2019-04-05 | Stop reason: SDUPTHER

## 2018-07-09 ENCOUNTER — PROCEDURE VISIT (OUTPATIENT)
Dept: NEUROLOGY | Facility: CLINIC | Age: 48
End: 2018-07-09
Payer: COMMERCIAL

## 2018-07-09 VITALS
DIASTOLIC BLOOD PRESSURE: 94 MMHG | HEIGHT: 69 IN | SYSTOLIC BLOOD PRESSURE: 120 MMHG | TEMPERATURE: 99.2 F | WEIGHT: 188 LBS | HEART RATE: 89 BPM | BODY MASS INDEX: 27.85 KG/M2

## 2018-07-09 DIAGNOSIS — G43.709 CHRONIC MIGRAINE WITHOUT AURA WITHOUT STATUS MIGRAINOSUS, NOT INTRACTABLE: Primary | ICD-10-CM

## 2018-07-09 PROCEDURE — 64615 CHEMODENERV MUSC MIGRAINE: CPT | Performed by: PSYCHIATRY & NEUROLOGY

## 2018-07-09 NOTE — PROGRESS NOTES
Chemodenervation  Date/Time: 7/9/2018 3:03 PM  Performed by: Mary Spivey  Authorized by: Tree Longoria details:     Prepped With: Alcohol    Anesthesia (see MAR for exact dosages): Anesthesia method:  None  Procedure details:     Position:  Upright  Botox:     Botox Type:  Type A    Brand:  Botox    Final Concentration per CC:  50 units  Procedures:     Botox Procedures: chronic headache      Indications: migraines      Date of last exam:  5/7/2018    Date of last injection:  3/26/2018        Procedure Note:        200 u --Mml of Botox-A and 4 8cc ml of sterile saline were mixed  -- EMG guidance was not used in identifying the injection site     Risks were discussed with the patient  We discussed possible complications, including infection       Injection location:      20 units, 2 sites unit(s) was injected into the procerus muscle     5 unit(s) was injected into the  right  muscle     5 unit(s) was injected into the  left  muscle     10 unit(s) was injected into the  right frontalis muscle     10 unit(s) was injected into the  left frontalis muscle     35 unit(s) was injected into the  right temporalis muscle     35 u, 7 sites unit(s) was injected into the  left temporalis muscle     0 unit(s) was injected into the  right occipitalis muscle     0 unit(s) was injected into the  left occipitalis muscle     15 unit(s) was injected into the  right cervical paraspinal muscle     15 unit(s) was injected into the  left cervical paraspinal muscle     15 unit(s) was injected into the  right trapezius muscle     15 unit(s) was injected into the  left trapezius muscle         A total of 180units were used  A total of 20units were discarded  Lot number: [id number]  Expiration date: 01/2021 and 01/2021     Follow-up in the office in 6 week(s)  Diagnosis ICD-10-CM Associated Orders   1   Chronic migraine without aura without status migrainosus, not intractable G43 709 Patient would like to eventually not get as many botox injections due to fear of needle  We have arranged for him to start [address] however we won't know if it's as effective for next few months  CARROL Lopes Ra  [facility]

## 2018-08-27 ENCOUNTER — OFFICE VISIT (OUTPATIENT)
Dept: NEUROLOGY | Facility: CLINIC | Age: 48
End: 2018-08-27
Payer: COMMERCIAL

## 2018-08-27 VITALS
SYSTOLIC BLOOD PRESSURE: 100 MMHG | WEIGHT: 185 LBS | BODY MASS INDEX: 27.4 KG/M2 | DIASTOLIC BLOOD PRESSURE: 72 MMHG | HEART RATE: 78 BPM | HEIGHT: 69 IN

## 2018-08-27 DIAGNOSIS — G43.709 CHRONIC MIGRAINE WITHOUT AURA WITHOUT STATUS MIGRAINOSUS, NOT INTRACTABLE: Primary | ICD-10-CM

## 2018-08-27 PROCEDURE — 99214 OFFICE O/P EST MOD 30 MIN: CPT | Performed by: PSYCHIATRY & NEUROLOGY

## 2018-10-15 ENCOUNTER — PROCEDURE VISIT (OUTPATIENT)
Dept: NEUROLOGY | Facility: CLINIC | Age: 48
End: 2018-10-15
Payer: COMMERCIAL

## 2018-10-15 VITALS
TEMPERATURE: 97.6 F | HEART RATE: 70 BPM | DIASTOLIC BLOOD PRESSURE: 82 MMHG | SYSTOLIC BLOOD PRESSURE: 118 MMHG | BODY MASS INDEX: 26.96 KG/M2 | HEIGHT: 69 IN | WEIGHT: 182 LBS

## 2018-10-15 DIAGNOSIS — G43.701 CHRONIC MIGRAINE WITHOUT AURA WITH STATUS MIGRAINOSUS, NOT INTRACTABLE: Primary | ICD-10-CM

## 2018-10-15 PROCEDURE — 64615 CHEMODENERV MUSC MIGRAINE: CPT | Performed by: PSYCHIATRY & NEUROLOGY

## 2018-10-15 NOTE — PROGRESS NOTES
Chemodenervation  Date/Time: 10/15/2018 9:20 AM  Performed by: Marcia Magana  Authorized by: Moni Delcid details:     Prepped With: Alcohol    Anesthesia (see MAR for exact dosages): Anesthesia method:  None  Procedure details:     Position:  Upright  Botox:     Botox Type:  Type A    Brand:  Botox    mL's of Botulinum Toxin:  180    Final Concentration per CC:  50 units    Medication Administration:  100 Units onabotulinumtoxin A 100 units  Procedures:     Botox Procedures: chronic headache      Date of last exam:  8/25/2018    Date of last injection:  7/9/2018  Total Units:     Total units used:  180    Total units discarded:  20  Post-procedure details:     Chemodenervation:  Chronic migraine    Facial Nerve Location[de-identified]  Bilateral facial nerve    Patient tolerance of procedure: Tolerated well, no immediate complications        Sites and units as follows:    20 units, 2 sites unit(s) was injected into the procerus muscle     5 unit(s) was injected into the  right  muscle     5 unit(s) was injected into the  left  muscle     10 unit(s) was injected into the  right frontalis muscle     10 unit(s) was injected into the  left frontalis muscle     35 unit(s) was injected into the  right temporalis muscle     35 u, 7 sites unit(s) was injected into the  left temporalis muscle     0 unit(s) was injected into the  right occipitalis muscle     0 unit(s) was injected into the  left occipitalis muscle     15 unit(s) was injected into the  right cervical paraspinal muscle     15 unit(s) was injected into the  left cervical paraspinal muscle     15 unit(s) was injected into the  right trapezius muscle     15 unit(s) was injected into the  left trapezius muscle           A total of 180units were used  A total of 20units were discarded  Diagnosis ICD-10-CM Associated Orders   1   Chronic migraine without aura with status migrainosus, not intractable G43 701 Chemodenervation Patient will resume Aimovig 140mg/monthly and botox every 3months  Combination of both have helped him a lot  This past summer, patient has had rare headaches

## 2018-10-18 ENCOUNTER — OFFICE VISIT (OUTPATIENT)
Dept: FAMILY MEDICINE CLINIC | Facility: CLINIC | Age: 48
End: 2018-10-18
Payer: COMMERCIAL

## 2018-10-18 VITALS
SYSTOLIC BLOOD PRESSURE: 118 MMHG | BODY MASS INDEX: 27.4 KG/M2 | TEMPERATURE: 98.5 F | HEIGHT: 69 IN | HEART RATE: 64 BPM | RESPIRATION RATE: 18 BRPM | WEIGHT: 185 LBS | DIASTOLIC BLOOD PRESSURE: 82 MMHG

## 2018-10-18 DIAGNOSIS — H66.003 ACUTE SUPPURATIVE OTITIS MEDIA OF BOTH EARS WITHOUT SPONTANEOUS RUPTURE OF TYMPANIC MEMBRANES, RECURRENCE NOT SPECIFIED: Primary | ICD-10-CM

## 2018-10-18 PROCEDURE — 3008F BODY MASS INDEX DOCD: CPT | Performed by: FAMILY MEDICINE

## 2018-10-18 PROCEDURE — 99213 OFFICE O/P EST LOW 20 MIN: CPT | Performed by: FAMILY MEDICINE

## 2018-10-18 RX ORDER — AZITHROMYCIN 250 MG/1
TABLET, FILM COATED ORAL
Qty: 6 TABLET | Refills: 0 | Status: SHIPPED | OUTPATIENT
Start: 2018-10-18 | End: 2018-10-22

## 2018-10-18 NOTE — PROGRESS NOTES
Assessment/Plan:    Problem List Items Addressed This Visit     None      Visit Diagnoses     Acute suppurative otitis media of both ears without spontaneous rupture of tympanic membranes, recurrence not specified    -  Primary    Relevant Medications    azithromycin (ZITHROMAX) 250 mg tablet          There are no Patient Instructions on file for this visit  No Follow-up on file  Subjective:      Patient ID: Julia Jones is a 53 y o  male  Chief Complaint   Patient presents with    Earache     3days- bilateral ears- [address]       Pt states both ears hurt  Started a few days ago  Pressure and pain  Cough - productive  Fever at home  chills      Earache    Pertinent negatives include no abdominal pain, headaches, sore throat or vomiting  The following portions of the patient's history were reviewed and updated as appropriate: allergies, current medications, past family history, past medical history, past social history, past surgical history and problem list     Review of Systems   Constitutional: Negative for activity change, appetite change, chills, diaphoresis, fatigue, fever and unexpected weight change  HENT: Positive for ear pain  Negative for congestion, dental problem, mouth sores, sinus pain, sinus pressure, sore throat and trouble swallowing  Eyes: Negative for photophobia, discharge and itching  Respiratory: Negative for apnea, chest tightness and shortness of breath  Cardiovascular: Negative for chest pain, palpitations and leg swelling  Gastrointestinal: Negative for abdominal distention, abdominal pain, blood in stool, nausea and vomiting  Endocrine: Negative for cold intolerance, heat intolerance, polydipsia, polyphagia and polyuria  Genitourinary: Negative for difficulty urinating  Musculoskeletal: Negative for arthralgias  Skin: Negative for color change and wound  Neurological: Negative for dizziness, syncope, speech difficulty and headaches     Hematological: Negative for adenopathy  Psychiatric/Behavioral: Negative for agitation and behavioral problems  Current Outpatient Prescriptions   Medication Sig Dispense Refill    atorvastatin (LIPITOR) 20 mg tablet TAKE ONE TABLET BY MOUTH DAILY 90 tablet 1    escitalopram (LEXAPRO) 20 mg tablet Take 1 tablet (20 mg total) by mouth daily  0    gabapentin (NEURONTIN) 300 mg capsule Take 1 capsule (300 mg total) by mouth 3 (three) times a day 90 capsule 5    lamoTRIgine (LAMICTAL) 150 MG tablet Take 1 tablet (150 mg total) by mouth 2 (two) times a day  0    SUMAtriptan Succinate 6 MG/0 5ML SOTJ Inject 0 5 mL (6 mg total) under the skin once as needed (migraine) for up to 1 dose 6 Syringe 1    verapamil (VERELAN PM) 120 MG 24 hr capsule Take 1 capsule (120 mg total) by mouth daily at bedtime 30 capsule 4    azithromycin (ZITHROMAX) 250 mg tablet Take 2 tablets today then 1 tablet daily x 4 days 6 tablet 0     No current facility-administered medications for this visit  Objective:    /82   Pulse 64   Temp 98 5 °F (36 9 °C)   Resp 18   Ht 5' 9" (1 753 m)   Wt 83 9 kg (185 lb)   BMI 27 32 kg/m²        Physical Exam   Constitutional: He appears well-developed and well-nourished  No distress  HENT:   Head: Normocephalic and atraumatic  Right Ear: External ear normal  Tympanic membrane is erythematous and bulging  Left Ear: External ear normal  Tympanic membrane is erythematous and bulging  Nose: Nose normal    Mouth/Throat: Oropharynx is clear and moist  No oropharyngeal exudate  Eyes: Pupils are equal, round, and reactive to light  EOM are normal  Right eye exhibits no discharge  Left eye exhibits no discharge  No scleral icterus  Neck: No thyromegaly present  Cardiovascular: Normal rate and normal heart sounds  No murmur heard  Pulmonary/Chest: Effort normal and breath sounds normal  No respiratory distress  He has no wheezes  Abdominal: Soft   Bowel sounds are normal  He exhibits no distension and no mass  There is no tenderness  There is no rebound and no guarding  Musculoskeletal: Normal range of motion  Neurological: He is alert  He displays normal reflexes  Coordination normal    Skin: Skin is warm and dry  No rash noted  He is not diaphoretic  No erythema  Psychiatric: He has a normal mood and affect  His behavior is normal    Nursing note and vitals reviewed               Arsenio Thomas DO

## 2018-11-27 ENCOUNTER — OFFICE VISIT (OUTPATIENT)
Dept: NEUROLOGY | Facility: CLINIC | Age: 48
End: 2018-11-27
Payer: COMMERCIAL

## 2018-11-27 VITALS
HEART RATE: 101 BPM | WEIGHT: 190 LBS | BODY MASS INDEX: 28.14 KG/M2 | RESPIRATION RATE: 18 BRPM | SYSTOLIC BLOOD PRESSURE: 110 MMHG | HEIGHT: 69 IN | DIASTOLIC BLOOD PRESSURE: 80 MMHG

## 2018-11-27 DIAGNOSIS — IMO0002 CHRONIC MIGRAINE: Primary | ICD-10-CM

## 2018-11-27 PROCEDURE — 99214 OFFICE O/P EST MOD 30 MIN: CPT | Performed by: PSYCHIATRY & NEUROLOGY

## 2018-11-27 NOTE — PROGRESS NOTES
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  He had previously tried on topamax and since botox was working, he was taken off of them  He's doing quite well  He takes sumavel injections prn  It does work  He denies any side effects of botox  He's also placed on Aimovig injections  He's had 3 sessions by now  In past 6 weeks he's only had one migraine  He is also on Verapmail 120mg daily       Past Medical History:   Diagnosis Date    Cubital tunnel syndrome     last assessed 1/6/14    Depression 11/07/2005    Hyperlipidemia     Hypertension     Migraine     Psychiatric disorder      Social History     Social History    Marital status:      Spouse name: N/A    Number of children: N/A    Years of education: N/A     Occupational History    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 [employer]      Social History Main Topics    Smoking status: Never Smoker    Smokeless tobacco: Never Used      Comment: exposure to second hand smoke    Alcohol use No    Drug use: No      Comment: social per Allscripts    Sexual activity: Yes     Partners: Female     Other Topics Concern    Not on file     Social History Narrative    Daily tea consumption     per [employer]         Family History   Problem Relation Age of Onset   [facility] Migraines Mother     Depression Mother     No Known Problems Father     Arthritis Family     Breast cancer Family     Heart disease Family         cardiac disorder    Hypertension Family     Breast cancer Sister     Asperger's syndrome Son      No Known Allergies  Current Outpatient Prescriptions on File Prior to Visit Medication Sig Dispense Refill    atorvastatin (LIPITOR) 20 mg tablet TAKE ONE TABLET BY MOUTH DAILY 90 tablet 1    escitalopram (LEXAPRO) 20 mg tablet Take 1 tablet (20 mg total) by mouth daily  0    gabapentin (NEURONTIN) 300 mg capsule Take 1 capsule (300 mg total) by mouth 3 (three) times a day 90 capsule 5    lamoTRIgine (LAMICTAL) 150 MG tablet Take 1 tablet (150 mg total) by mouth 2 (two) times a day  0    SUMAtriptan Succinate 6 MG/0 5ML SOTJ Inject 0 5 mL (6 mg total) under the skin once as needed (migraine) for up to 1 dose 6 Syringe 1    verapamil (VERELAN PM) 120 MG 24 hr capsule Take 1 capsule (120 mg total) by mouth daily at bedtime 30 capsule 4     No current facility-administered medications on file prior to visit  Review of Systems   Refer to positive review of systems in HPI     Constitutional- No fever  Eyes- No visual change  ENT- Hearing normal  CV- No chest pain  Resp- No Shortness of breath  GI- No diarrhea  - Bladder normal  MS- No Arthritis   Skin- No rash  Psych- No depression  Endo- No DM  Heme- No nodes    Vitals:    11/27/18 1548   BP: 110/80   BP Location: Left arm   Patient Position: Sitting   Cuff Size: Adult   Pulse: 101   Resp: 18   Weight: 86 2 kg (190 lb)   Height: 5' 9" (1 753 m)       PHYSICAL EXAM:  Appearance: No Acute Distress  Ophthalmoscopic: Disc Flat, Normal fundus  Mental status:  Orientation: Awake, Alert, and Orientedx3  Memory: Registation 3/3 Recall 3/3  Attention: normal  Knowledge: good  Language: No aphasia  Speech: No dysarthria  Cranial Nerves:  2 No Visual Defect on Confrontation, Pupils round, equal, reactive to light  3,4,6 Extraocular Movements Intact, no nystagmus  5 Facial Sensation Intact  7 No facial asymmetry  8 Intact hearing  9,10 Palate symmetric, normal gag  11 Good shoulder shrug  12 Tongue Midline  Gait: Stable  Coordination: No ataxia with finger to nose testing, and heel to shin  Sensory: Intact, Symmetric to pinprick, light touch, vibration, and joint position  Muscle Tone: Normal              Muscle exam:  Arm Right Left Leg Right Left   Deltoid 5/5 5/5 Iliopsoas 5/5 5/5   Biceps 5/5 5/5 Quads 5/5 5/5   Triceps 5/5 5/5 Hamstrings 5/5 5/5   Wrist Extension 5/5 5/5 Ankle Dorsi Flexion 5/5 5/5   Wrist Flexion 5/5 5/5 Ankle Plantar Flexion 5/5 5/5   Interossei 5/5 5/5 Ankle Eversion 5/5 5/5   APB 5/5 5/5 Ankle Inversion 5/5 5/5       Reflexes   RJ BJ TJ KJ AJ Plantars Menard's   Right 2+ 2+ 2+ 2+ 2+ Downgoing Not present   Left 2+ 2+ 2+ 2+ 2+ Downgoing Not present     Personal review of  Labs:                  Diagnoses and all orders for this visit:        1  Chronic migraine         He's doing very well with combination of botox every 3 months and Aimovig monthly  He's now at his goal     He takes sumatriptan inj prn  Will resume daily verapamil  Counseling Documentation:  The patient and/or patient's family were  counseled regarding diagnostic results  Instructions for management,risk factor reductions,prognosis of disease were discussed  Patient and family were educated regarding impressions,risks and benefits of treatment options,importance of compliance with treatment        Total time of encounter:  30 min  More than 50% of the time was used in counseling and/or coordination of care  Extent of counseling and/or coordination of care        MD Agapito [facility]  [address] [place] [address]  [email or web address]   [phone]

## 2019-01-21 ENCOUNTER — PROCEDURE VISIT (OUTPATIENT)
Dept: NEUROLOGY | Facility: CLINIC | Age: 49
End: 2019-01-21
Payer: COMMERCIAL

## 2019-01-21 VITALS
TEMPERATURE: 98 F | SYSTOLIC BLOOD PRESSURE: 134 MMHG | WEIGHT: 183 LBS | BODY MASS INDEX: 27.11 KG/M2 | HEART RATE: 56 BPM | HEIGHT: 69 IN | DIASTOLIC BLOOD PRESSURE: 84 MMHG

## 2019-01-21 DIAGNOSIS — G43.709 CHRONIC MIGRAINE WITHOUT AURA WITHOUT STATUS MIGRAINOSUS, NOT INTRACTABLE: Primary | ICD-10-CM

## 2019-01-21 PROCEDURE — 64615 CHEMODENERV MUSC MIGRAINE: CPT | Performed by: PSYCHIATRY & NEUROLOGY

## 2019-01-21 NOTE — PROGRESS NOTES
Chemodenervation  Date/Time: 1/21/2019 9:27 AM  Performed by: Chavez Davis  Authorized by: Rene Bolaños details:     Prepped With: Alcohol    Anesthesia (see MAR for exact dosages): Anesthesia method:  None  Procedure details:     Position:  Upright  Botox:     Brand:  Botox    mL's of Botulinum Toxin:  180    mL's of preservative free sterile saline:  2 3cc/50 units    Final Concentration per CC:  200 units, 100 units and 50 units    Needle Gauge:  30 G 2 5 inch    Medication Administration:  100 Units onabotulinumtoxin A 100 units  Procedures:     Botox Procedures: chronic headache      Indications: migraines      Date of last exam:  11/27/1919    Date of last injection:  10/15/1919  Total Units:     Total units used:  180    Total units discarded:  20  Post-procedure details:     Chemodenervation:  Chronic migraine    Facial Nerve Location[de-identified]  Bilateral facial nerve    Patient tolerance of procedure: Tolerated well, no immediate complications        20 units, 2 sites unit(s) was injected into the procerus muscle     5 unit(s) was injected into the  right  muscle     5 unit(s) was injected into the  left  muscle     10 unit(s) was injected into the  right frontalis muscle     10 unit(s) was injected into the  left frontalis muscle     35 unit(s) was injected into the  right temporalis muscle     35 u, 7 sites unit(s) was injected into the  left temporalis muscle     0 unit(s) was injected into the  right occipitalis muscle     0 unit(s) was injected into the  left occipitalis muscle     15 unit(s) was injected into the  right cervical paraspinal muscle     15 unit(s) was injected into the  left cervical paraspinal muscle     15 unit(s) was injected into the  right trapezius muscle     15 unit(s) was injected into the  left trapezius muscle        A total of 180units were used  A total of 20units were discarded  Diagnosis ICD-10-CM Associated Orders   1   Chronic migraine without aura without status migrainosus, not intractable G43 995        Since he's doing well, he may consider doing botox in 6 months if can tolerate it without return of headaches

## 2019-03-01 ENCOUNTER — OFFICE VISIT (OUTPATIENT)
Dept: NEUROLOGY | Facility: CLINIC | Age: 49
End: 2019-03-01
Payer: COMMERCIAL

## 2019-03-01 ENCOUNTER — TELEPHONE (OUTPATIENT)
Dept: NEUROLOGY | Facility: CLINIC | Age: 49
End: 2019-03-01

## 2019-03-01 VITALS
HEART RATE: 76 BPM | BODY MASS INDEX: 26.43 KG/M2 | WEIGHT: 179 LBS | DIASTOLIC BLOOD PRESSURE: 88 MMHG | SYSTOLIC BLOOD PRESSURE: 123 MMHG

## 2019-03-01 DIAGNOSIS — G43.709 CHRONIC MIGRAINE WITHOUT AURA WITHOUT STATUS MIGRAINOSUS, NOT INTRACTABLE: Primary | ICD-10-CM

## 2019-03-01 DIAGNOSIS — G43.109 MIGRAINE WITH AURA AND WITHOUT STATUS MIGRAINOSUS, NOT INTRACTABLE: ICD-10-CM

## 2019-03-01 PROCEDURE — 99214 OFFICE O/P EST MOD 30 MIN: CPT | Performed by: PHYSICIAN ASSISTANT

## 2019-03-01 RX ORDER — IBUPROFEN 600 MG/1
TABLET ORAL AS NEEDED
COMMUNITY
Start: 2019-01-22

## 2019-03-01 NOTE — PROGRESS NOTES
Patient ID: Camryn Castanon is a 53 y o  male  Assessment/Plan:     Problem List Items Addressed This Visit        Cardiovascular and Mediastinum    Chronic migraine without aura without status migrainosus, not intractable - Primary    Migraine with aura and without status migrainosus, not intractable            The patient is doing much better on the combination of Botox and Aimovig  He reports fewer and less severe migraines as noted below  He actually prefers to continue Aimovig because it is only 1-2 injections every 30 days versus multiple injections associated with Botox  I will reorder Valentino Hansen to see if this is still approved by his insurance (he has not received a repeat Aimovig rx in the mail as he usually would)  For now he will follow up for Botox in April, and have an office visit in 3 months  I encouraged to call me in the meantime with any questions or concerns as they arise  Subjective:    HPI     Mr Camryn Castanon is an extremely pleasant 54 yo male here for neurological f/u for chronic migraines  Last botox injection 1/21/19  States these help however he prefers not to have all of the injections, rather he is more comfortable with having 1-2 Aimovig injections per month or every 30 days  He states he is fearful of needles but will do whatever it takes to get rid of the headaches  He denies s/e to both botox and aimovig  Since starting the combination of Aimovig with Botox for the past 3-4 months he has had only 1-2 migraines per month which is significantly less frequent migraines than prior to starting the Aimovig  Since starting botox, the patient reports greater than 7 days of migraine relief from baseline, correlated with headache diary, decreased abortive medication use and decreased ER visits      Aura- blurry vision in the corner of his eye (one or the other, or both)  See ophtho in [place] at least every year with normal dilated eye exam     Currently on disability  Continues other medications:  150 mg Lamictal b i d , 120 mg 24 hr verapamil at bedtime, gabapentin 300 mg t i d  (Which is also used for cough per his history today)  He denies side effects to these medications  Previously tried Topamax, it was noted in prior office note that since Botox helped he was able to wean off of Topamax  For rescue uses sumatriptan injection without side effects, states that works well  Family history of migraines in his mother, he has 6 siblings and none of them have migraines  He has a history of several concussions in the past, most occurring in childhood, and some occurring prior to the development of migraines and 6years old  The following portions of the patient's history were reviewed and updated as appropriate:   He  has a past medical history of Cubital tunnel syndrome, Depression (11/07/2005), Hyperlipidemia, Hypertension, Migraine, and Psychiatric disorder  He   Patient Active Problem List    Diagnosis Date Noted    Migraine with aura and without status migrainosus, not intractable 03/01/2019    Astigmatism of right eye 05/04/2018    Screening for cardiovascular condition 04/18/2018    Chronic migraine without aura without status migrainosus, not intractable 03/26/2018    Hypercholesterolemia 09/04/2012    Benign familial tremor 09/04/2012    Bipolar affective disorder, currently depressed, moderate ([facility] [address] ) 09/04/2012    Migraine, unspecified, not intractable, without status migrainosus 09/04/2012    Persistent insomnia of non-organic origin 09/04/2012     He  has a past surgical history that includes SHOULDER ARTHROPLASTY; Knee arthrocentesis; Elbow Arthroplasty (Bilateral); TONSILLECTOMY; Sinus surgery; and Shoulder surgery (12/06/2018)  His family history includes Arthritis in his family; Asperger's syndrome in his son; Breast cancer in his family and sister; Depression in his mother; Heart disease in his family;  Hypertension in his family; Migraines in his mother; No Known Problems in his father  He  reports that he has never smoked  He has never used smokeless tobacco  He reports that he does not drink alcohol or use drugs  Current Outpatient Medications   Medication Sig Dispense Refill    atorvastatin (LIPITOR) 20 mg tablet TAKE ONE TABLET BY MOUTH DAILY 90 tablet 1    escitalopram (LEXAPRO) 20 mg tablet Take 1 tablet (20 mg total) by mouth daily  0    gabapentin (NEURONTIN) 300 mg capsule Take 1 capsule (300 mg total) by mouth 3 (three) times a day 90 capsule 5    ibuprofen (MOTRIN) 600 mg tablet as needed       lamoTRIgine (LAMICTAL) 150 MG tablet Take 1 tablet (150 mg total) by mouth 2 (two) times a day  0    SUMAtriptan Succinate 6 MG/0 5ML SOTJ Inject 0 5 mL (6 mg total) under the skin once as needed (migraine) for up to 1 dose 6 Syringe 1    verapamil (VERELAN PM) 120 MG 24 hr capsule Take 1 capsule (120 mg total) by mouth daily at bedtime 30 capsule 4    Erenumab-aooe 70 MG/ML SOAJ Inject 70 mg under the skin every 30 (thirty) days 1 pen 2     No current facility-administered medications for this visit  He has No Known Allergies            Objective:    Blood pressure 123/88, pulse 76, weight 81 2 kg (179 lb)  Physical Exam    Neurological Exam  Vital signs reviewed  Well developed, well nourished  Head: Normocephalic, atraumatic  CN 9-27: intact and symmetric, including EOMs which are normal b/l and PERRL  Fundi b/l are normal to crude ophthalmological examination  MSK: 5/5 t/o  ROM normal x all 4 extr  Sensation: Inact to LT and temp x4 extr  Reflexes: 2+ and symmetric in all 4 extr  Coordination: Nml x4 extr  Gait: Steady normal gait  ROS:    Review of Systems   Constitutional: Negative  HENT: Negative  Eyes: Negative  Respiratory: Negative  Cardiovascular: Negative  Gastrointestinal: Negative  Endocrine: Negative  Genitourinary: Negative  Musculoskeletal: Negative      Skin: Negative  Allergic/Immunologic: Negative  Neurological: Negative  Hematological: Negative  Psychiatric/Behavioral: Negative  The following portions of the patient's history were reviewed and updated as appropriate: allergies, current medications/ medication history, past family history, past medical history, past social history, past surgical history and problem list     Review of systems was reviewed and otherwise unremarkable from a neurological perspective

## 2019-03-01 NOTE — TELEPHONE ENCOUNTER
Spoke w/ Jaqui Limon [facility] and states that [facility] requires Robert Warner  [id number]  PCN none  group [id number]  ID [id number]  [phone]    PA completed on CMM, key# [id number]  PA approved  Lisa Prakash at [facility] and pt made aware

## 2019-03-01 NOTE — TELEPHONE ENCOUNTER
Pt states that Suresh Common was working well for him, however wondering if still covered because it was not sent to him this past month  I will rx this again and could you let me know if denied?

## 2019-03-15 ENCOUNTER — TELEPHONE (OUTPATIENT)
Dept: NEUROLOGY | Facility: CLINIC | Age: 49
End: 2019-03-15

## 2019-03-15 DIAGNOSIS — G43.709 CHRONIC MIGRAINE WITHOUT AURA WITHOUT STATUS MIGRAINOSUS, NOT INTRACTABLE: ICD-10-CM

## 2019-03-15 NOTE — TELEPHONE ENCOUNTER
Pt asked for aimovig refill  He was only getting 70 mg, he used to use 140 mg q30 days so sent this

## 2019-03-25 ENCOUNTER — OFFICE VISIT (OUTPATIENT)
Dept: FAMILY MEDICINE CLINIC | Facility: CLINIC | Age: 49
End: 2019-03-25
Payer: COMMERCIAL

## 2019-03-25 VITALS
TEMPERATURE: 98.2 F | RESPIRATION RATE: 20 BRPM | HEIGHT: 69 IN | WEIGHT: 188 LBS | SYSTOLIC BLOOD PRESSURE: 136 MMHG | HEART RATE: 86 BPM | BODY MASS INDEX: 27.85 KG/M2 | DIASTOLIC BLOOD PRESSURE: 80 MMHG

## 2019-03-25 DIAGNOSIS — H69.83 DYSFUNCTION OF BOTH EUSTACHIAN TUBES: Primary | ICD-10-CM

## 2019-03-25 PROCEDURE — 99213 OFFICE O/P EST LOW 20 MIN: CPT | Performed by: NURSE PRACTITIONER

## 2019-03-25 RX ORDER — FLUTICASONE PROPIONATE 50 MCG
2 SPRAY, SUSPENSION (ML) NASAL DAILY
Qty: 16 G | Refills: 1 | Status: SHIPPED | OUTPATIENT
Start: 2019-03-25 | End: 2019-08-19 | Stop reason: ALTCHOICE

## 2019-03-25 NOTE — PROGRESS NOTES
Assessment/Plan:    Will start on Flonase  If no improvement, consider medrol vira  RTO prn  Problem List Items Addressed This Visit     None      Visit Diagnoses     Dysfunction of both eustachian tubes    -  Primary    Relevant Medications    fluticasone (FLONASE) 50 mcg/act nasal spray              There are no Patient Instructions on file for this visit  Return if symptoms worsen or fail to improve  Subjective:      Patient ID: Roc Hamlin is a 51 y o  male  Chief Complaint   Patient presents with    Earache     symptoms for the past week  [id number]       C/o bilateral ear pain for the past week  He has mild sinus congestion and a dry cough  Denies fever, chest congestion, SOB, or wheezing  Not taking anything OTC for pain  The following portions of the patient's history were reviewed and updated as appropriate: allergies, current medications, past family history, past medical history, past social history, past surgical history and problem list     Review of Systems   Constitutional: Negative for chills, fatigue and fever  HENT: Positive for congestion and ear pain  Negative for postnasal drip, rhinorrhea, sinus pressure and sore throat  Respiratory: Positive for cough  Negative for shortness of breath and wheezing  Cardiovascular: Negative for chest pain  Gastrointestinal: Negative for abdominal pain, diarrhea, nausea and vomiting  Musculoskeletal: Negative for arthralgias  Skin: Negative for rash  Neurological: Negative for headaches           Current Outpatient Medications   Medication Sig Dispense Refill    atorvastatin (LIPITOR) 20 mg tablet TAKE ONE TABLET BY MOUTH DAILY 90 tablet 1    Erenumab-aooe 70 MG/ML SOAJ Inject 140 mg under the skin every 30 (thirty) days 2 pen 3    escitalopram (LEXAPRO) 20 mg tablet Take 1 tablet (20 mg total) by mouth daily  0    gabapentin (NEURONTIN) 300 mg capsule Take 1 capsule (300 mg total) by mouth 3 (three) times a day 90 capsule 5    ibuprofen (MOTRIN) 600 mg tablet as needed       lamoTRIgine (LAMICTAL) 150 MG tablet Take 1 tablet (150 mg total) by mouth 2 (two) times a day  0    SUMAtriptan Succinate 6 MG/0 5ML SOTJ Inject 0 5 mL (6 mg total) under the skin once as needed (migraine) for up to 1 dose 6 Syringe 1    verapamil (VERELAN PM) 120 MG 24 hr capsule Take 1 capsule (120 mg total) by mouth daily at bedtime 30 capsule 4    fluticasone (FLONASE) 50 mcg/act nasal spray 2 sprays into each nostril daily 16 g 1     No current facility-administered medications for this visit  Objective:    /80   Pulse 86   Temp 98 2 °F (36 8 °C)   Resp 20   Ht 5' 9" (1 753 m)   Wt 85 3 kg (188 lb)   BMI 27 76 kg/m²        Physical Exam   Constitutional: He appears well-developed and well-nourished  HENT:   Head: Normocephalic and atraumatic  Right Ear: External ear and ear canal normal  Tympanic membrane is bulging  A middle ear effusion (serous) is present  Left Ear: External ear and ear canal normal  Tympanic membrane is bulging  Nose: No mucosal edema or rhinorrhea  Mouth/Throat: Uvula is midline, oropharynx is clear and moist and mucous membranes are normal    Eyes: Conjunctivae are normal    Neck: Neck supple  No edema present  No thyromegaly present  Cardiovascular: Normal rate, regular rhythm, normal heart sounds and intact distal pulses  No murmur heard  Pulmonary/Chest: Effort normal and breath sounds normal    Abdominal: Bowel sounds are normal  He exhibits no distension  There is no splenomegaly or hepatomegaly  There is no tenderness  Lymphadenopathy:        Right cervical: No superficial cervical adenopathy present  Left cervical: No superficial cervical adenopathy present  Skin: Skin is warm, dry and intact  No rash noted  Psychiatric: He has a normal mood and affect  Nursing note and vitals reviewed               Neida Ernandez

## 2019-04-05 ENCOUNTER — OFFICE VISIT (OUTPATIENT)
Dept: FAMILY MEDICINE CLINIC | Facility: CLINIC | Age: 49
End: 2019-04-05
Payer: COMMERCIAL

## 2019-04-05 VITALS
DIASTOLIC BLOOD PRESSURE: 78 MMHG | RESPIRATION RATE: 16 BRPM | HEART RATE: 78 BPM | SYSTOLIC BLOOD PRESSURE: 126 MMHG | WEIGHT: 187.6 LBS | HEIGHT: 69 IN | BODY MASS INDEX: 27.78 KG/M2 | TEMPERATURE: 97.9 F

## 2019-04-05 DIAGNOSIS — E78.00 HYPERCHOLESTEROLEMIA: ICD-10-CM

## 2019-04-05 DIAGNOSIS — H66.93 BILATERAL OTITIS MEDIA, UNSPECIFIED OTITIS MEDIA TYPE: Primary | ICD-10-CM

## 2019-04-05 PROCEDURE — 99213 OFFICE O/P EST LOW 20 MIN: CPT | Performed by: FAMILY MEDICINE

## 2019-04-05 PROCEDURE — 1036F TOBACCO NON-USER: CPT | Performed by: FAMILY MEDICINE

## 2019-04-05 PROCEDURE — 3008F BODY MASS INDEX DOCD: CPT | Performed by: FAMILY MEDICINE

## 2019-04-05 RX ORDER — ATORVASTATIN CALCIUM 20 MG/1
20 TABLET, FILM COATED ORAL DAILY
Qty: 90 TABLET | Refills: 0 | Status: SHIPPED | OUTPATIENT
Start: 2019-04-05 | End: 2019-09-26 | Stop reason: SDUPTHER

## 2019-04-05 RX ORDER — AZITHROMYCIN 250 MG/1
TABLET, FILM COATED ORAL
Qty: 6 TABLET | Refills: 0 | Status: SHIPPED | OUTPATIENT
Start: 2019-04-05 | End: 2019-04-10

## 2019-04-07 LAB
ALBUMIN SERPL-MCNC: 4.6 G/DL (ref 3.5–5.5)
ALBUMIN/GLOB SERPL: 2.3 {RATIO} (ref 1.2–2.2)
ALP SERPL-CCNC: 84 IU/L (ref 39–117)
ALT SERPL-CCNC: 30 IU/L (ref 0–44)
AST SERPL-CCNC: 19 IU/L (ref 0–40)
BILIRUB SERPL-MCNC: 0.6 MG/DL (ref 0–1.2)
BUN SERPL-MCNC: 13 MG/DL (ref 6–24)
BUN/CREAT SERPL: 13 (ref 9–20)
CALCIUM SERPL-MCNC: 9.7 MG/DL (ref 8.7–10.2)
CHLORIDE SERPL-SCNC: 103 MMOL/L (ref 96–106)
CHOLEST SERPL-MCNC: 148 MG/DL (ref 100–199)
CO2 SERPL-SCNC: 23 MMOL/L (ref 20–29)
CREAT SERPL-MCNC: 0.99 MG/DL (ref 0.76–1.27)
GLOBULIN SER-MCNC: 2 G/DL (ref 1.5–4.5)
GLUCOSE SERPL-MCNC: 102 MG/DL (ref 65–99)
HDLC SERPL-MCNC: 36 MG/DL
LABCORP COMMENT: NORMAL
LDLC SERPL CALC-MCNC: 92 MG/DL (ref 0–99)
MICRODELETION SYND BLD/T FISH: NORMAL
POTASSIUM SERPL-SCNC: 4.1 MMOL/L (ref 3.5–5.2)
PROT SERPL-MCNC: 6.6 G/DL (ref 6–8.5)
SL AMB EGFR AFRICAN AMERICAN: 104 ML/MIN/1.73
SL AMB EGFR NON AFRICAN AMERICAN: 90 ML/MIN/1.73
SODIUM SERPL-SCNC: 141 MMOL/L (ref 134–144)
TRIGL SERPL-MCNC: 102 MG/DL (ref 0–149)

## 2019-04-22 ENCOUNTER — PROCEDURE VISIT (OUTPATIENT)
Dept: NEUROLOGY | Facility: CLINIC | Age: 49
End: 2019-04-22
Payer: COMMERCIAL

## 2019-04-22 VITALS
HEIGHT: 69 IN | HEART RATE: 64 BPM | SYSTOLIC BLOOD PRESSURE: 122 MMHG | TEMPERATURE: 98.4 F | DIASTOLIC BLOOD PRESSURE: 78 MMHG | WEIGHT: 181 LBS | BODY MASS INDEX: 26.81 KG/M2

## 2019-04-22 DIAGNOSIS — G43.719 INTRACTABLE CHRONIC MIGRAINE WITHOUT AURA AND WITHOUT STATUS MIGRAINOSUS: Primary | ICD-10-CM

## 2019-04-22 PROCEDURE — 64615 CHEMODENERV MUSC MIGRAINE: CPT | Performed by: PSYCHIATRY & NEUROLOGY

## 2019-04-23 ENCOUNTER — TELEPHONE (OUTPATIENT)
Dept: NEUROLOGY | Facility: CLINIC | Age: 49
End: 2019-04-23

## 2019-05-31 ENCOUNTER — OFFICE VISIT (OUTPATIENT)
Dept: NEUROLOGY | Facility: CLINIC | Age: 49
End: 2019-05-31
Payer: COMMERCIAL

## 2019-05-31 VITALS
HEART RATE: 76 BPM | DIASTOLIC BLOOD PRESSURE: 93 MMHG | BODY MASS INDEX: 26.73 KG/M2 | WEIGHT: 181 LBS | SYSTOLIC BLOOD PRESSURE: 137 MMHG

## 2019-05-31 DIAGNOSIS — G43.109 MIGRAINE WITH AURA AND WITHOUT STATUS MIGRAINOSUS, NOT INTRACTABLE: ICD-10-CM

## 2019-05-31 DIAGNOSIS — G43.709 CHRONIC MIGRAINE WITHOUT AURA WITHOUT STATUS MIGRAINOSUS, NOT INTRACTABLE: Primary | ICD-10-CM

## 2019-05-31 PROCEDURE — 99214 OFFICE O/P EST MOD 30 MIN: CPT | Performed by: PHYSICIAN ASSISTANT

## 2019-05-31 RX ORDER — DIHYDROERGOTAMINE MESYLATE 4 MG/ML
1 SPRAY NASAL AS NEEDED
Qty: 6 ML | Refills: 0 | Status: SHIPPED | OUTPATIENT
Start: 2019-05-31 | End: 2019-12-27 | Stop reason: SDUPTHER

## 2019-06-04 DIAGNOSIS — R05.9 COUGH: ICD-10-CM

## 2019-06-04 RX ORDER — GABAPENTIN 300 MG/1
300 CAPSULE ORAL 3 TIMES DAILY
Qty: 90 CAPSULE | Refills: 5 | Status: SHIPPED | OUTPATIENT
Start: 2019-06-04 | End: 2020-07-29 | Stop reason: SDUPTHER

## 2019-06-26 ENCOUNTER — DOCUMENTATION (OUTPATIENT)
Dept: NEUROLOGY | Facility: CLINIC | Age: 49
End: 2019-06-26

## 2019-07-22 ENCOUNTER — PROCEDURE VISIT (OUTPATIENT)
Dept: NEUROLOGY | Facility: CLINIC | Age: 49
End: 2019-07-22
Payer: COMMERCIAL

## 2019-07-22 VITALS
TEMPERATURE: 97.2 F | DIASTOLIC BLOOD PRESSURE: 76 MMHG | SYSTOLIC BLOOD PRESSURE: 114 MMHG | BODY MASS INDEX: 27.25 KG/M2 | HEIGHT: 69 IN | WEIGHT: 184 LBS | HEART RATE: 65 BPM

## 2019-07-22 DIAGNOSIS — IMO0002 CHRONIC MIGRAINE: Primary | ICD-10-CM

## 2019-07-22 DIAGNOSIS — H92.03 EAR PAIN, BILATERAL: ICD-10-CM

## 2019-07-22 PROCEDURE — 64615 CHEMODENERV MUSC MIGRAINE: CPT | Performed by: PSYCHIATRY & NEUROLOGY

## 2019-07-22 RX ORDER — AZITHROMYCIN 250 MG/1
TABLET, FILM COATED ORAL
Qty: 6 TABLET | Refills: 0 | Status: SHIPPED | OUTPATIENT
Start: 2019-07-22 | End: 2019-07-26

## 2019-07-22 NOTE — PROGRESS NOTES
Chemodenervation  Date/Time: 7/22/2019 9:19 AM  Performed by: Manuelito Rojas MD  Authorized by: Manuelito Rojas MD     Pre-procedure details:     Prepped With: Alcohol    Anesthesia (see MAR for exact dosages): Anesthesia method:  None  Procedure details:     Position:  Upright  Botox:     Botox Type:  Type A    Brand:  Botox    mL's of Botulinum Toxin:  180    Final Concentration per CC:  50 units    Needle Gauge:  30 G 2 5 inch    Medication Administration:  100 Units onabotulinumtoxin A 100 units  Procedures:     Botox Procedures: chronic headache      Indications: migraines      Date of last exam:  5/31/2019    Date of last injection:  4/22/2019  Total Units:     Total units used:  180    Total units discarded:  20        Patient tolerated procedure well      20 units, 2 sites unit(s) was injected into the procerus muscle     5 unit(s) was injected into the  right  muscle     5 unit(s) was injected into the  left  muscle     10 unit(s) was injected into the  right frontalis muscle     10 unit(s) was injected into the  left frontalis muscle     35 unit(s) was injected into the  right temporalis muscle     35 u, 7 sites unit(s) was injected into the  left temporalis muscle     0 unit(s) was injected into the  right occipitalis muscle     0 unit(s) was injected into the  left occipitalis muscle     15 unit(s) was injected into the  right cervical paraspinal muscle     15 unit(s) was injected into the  left cervical paraspinal muscle     15 unit(s) was injected into the  right trapezius muscle     15 unit(s) was injected into the  left trapezius muscle        A total of 180units were used  A total of 20units were discarded       Diagnosis ICD-10-CM Associated Orders   1   Chronic migraine G43 709 Chemodenervation

## 2019-07-29 ENCOUNTER — TRANSCRIBE ORDERS (OUTPATIENT)
Dept: ADMINISTRATIVE | Facility: HOSPITAL | Age: 49
End: 2019-07-29

## 2019-07-29 ENCOUNTER — HOSPITAL ENCOUNTER (OUTPATIENT)
Dept: RADIOLOGY | Facility: HOSPITAL | Age: 49
Discharge: HOME | End: 2019-07-29
Payer: COMMERCIAL

## 2019-07-29 ENCOUNTER — OFFICE VISIT (OUTPATIENT)
Dept: PULMONOLOGY | Facility: MEDICAL CENTER | Age: 49
End: 2019-07-29
Payer: COMMERCIAL

## 2019-07-29 VITALS
SYSTOLIC BLOOD PRESSURE: 102 MMHG | HEIGHT: 69 IN | WEIGHT: 182 LBS | HEART RATE: 70 BPM | OXYGEN SATURATION: 97 % | TEMPERATURE: 98.4 F | BODY MASS INDEX: 26.96 KG/M2 | DIASTOLIC BLOOD PRESSURE: 60 MMHG | RESPIRATION RATE: 12 BRPM

## 2019-07-29 DIAGNOSIS — R05.3 CHRONIC COUGH: ICD-10-CM

## 2019-07-29 DIAGNOSIS — R05.9 COUGH: Primary | ICD-10-CM

## 2019-07-29 DIAGNOSIS — R09.82 POSTNASAL DRIP: ICD-10-CM

## 2019-07-29 PROCEDURE — 71046 X-RAY EXAM CHEST 2 VIEWS: CPT

## 2019-07-29 PROCEDURE — 99214 OFFICE O/P EST MOD 30 MIN: CPT | Performed by: NURSE PRACTITIONER

## 2019-07-29 PROCEDURE — 94010 BREATHING CAPACITY TEST: CPT | Performed by: NURSE PRACTITIONER

## 2019-07-29 RX ORDER — ERENUMAB-AOOE 140 MG/ML
INJECTION, SOLUTION SUBCUTANEOUS
COMMUNITY
Start: 2019-07-21 | End: 2019-08-19 | Stop reason: SDUPTHER

## 2019-07-29 NOTE — PROGRESS NOTES
I reviewed images with Dr Ryan Marrero that was done today  Elevation of right diaphragm  He does cough at night  He will have repeat visit  In 3 months

## 2019-07-29 NOTE — ASSESSMENT & PLAN NOTE
Tyson HAYES had CT of the chest in 2017  He did have a mild hiatal hernia  Patient will have a chest x-ray  Additionally, I would like him to be evaluated for any ENT so that his airway can be evaluated  He is agreeable to the same  He does have an ear nose and throat specialist in [facility] in [place]  His name is Dr Wharton Goes  He defers an nasal corticosteroid at this time  He will follow-up with his ear nose and throat specialist   It is also possible that he does have some GERD  Blaire Nicholson

## 2019-07-29 NOTE — ASSESSMENT & PLAN NOTE
Saundra Lin has had a chronic cough for 2 and half years  He was last seen in the office in March 2018  Both CT of chest and sinuses was normal   He had been on gabapentin 300 mg t i d  But stopped voluntarily about 3 weeks ago  PFT today shows normal spirometry  Forced vital capacity is 3 79 L or 90% of predicted, FEV1 is 3 15 L or 96% of predicted obstruction  Ratio is 86  Complete PFT was done in the past for which results revealed normal diffusion capacity and moderate air flow obstruction  Plan includes the following:  Since that is been some time as he has had chest imaging, I will order a routine chest x-ray to be done at patient's convenience  Additionally, I will reorder his gabapentin  He knows to start this on a gradual basis for which he will take 1 pill a day for 1 week and then increase to twice in 3 times a day in same increments  If cough worsens at any point, further consideration would be for a [place] allergy panel and also CBC with differential to evaluate ease eosinophils  I personally reviewed images of his CT of chest   I also reviewed patient's medications    Likely patient 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

## 2019-07-29 NOTE — PATIENT INSTRUCTIONS
Chronic Cough   WHAT YOU NEED TO KNOW:   A chronic cough is a cough that lasts more than 4 weeks in children or 8 weeks in adults  DISCHARGE INSTRUCTIONS:   Call 911 for any of the following:   · You cough up blood  · You faint when you cough  · You have trouble breathing  Contact your healthcare provider if:   · You have new or worsening symptoms  · You have severe pain when you take a deep breath  · You become very tired after a coughing fit  · You have trouble sleeping because of the coughing  · You have questions or concerns about your condition or care  Medicines:   · Medicines  may be needed to stop your cough  You may also need medicine to treat allergies or acid reflux, or decrease swelling in your airways  If you have a respiratory infection, you may need antibiotics  Medicine may be given as a pill or to use in an inhaler  · Take your medicine as directed  Contact your healthcare provider if you think your medicine is not helping or if you have side effects  Tell him or her if you are allergic to any medicine  Keep a list of the medicines, vitamins, and herbs you take  Include the amounts, and when and why you take them  Bring the list or the pill bottles to follow-up visits  Carry your medicine list with you in case of an emergency  Self-care:   · Prevent acid reflex  Acid reflux can make your chronic cough worse  Raise your head and upper back when you sleep  Place 2 or more pillows behind your head or sleep in a recliner  Do not lie down for at least 1 hour after you eat  Do not have foods or drinks that increase heartburn  Ask your healthcare provider for other ways to prevent acid reflux  · Do not smoke  Encourage your adolescent child not to smoke  Nicotine and other chemicals in cigarettes and cigars can cause lung damage  They can also make your cough worse  Ask your healthcare provider for information if you currently smoke and need help to quit   E-cigarettes or smokeless tobacco still contain nicotine  Talk to your healthcare provider before you use these products  · Stay away from secondhand smoke  Do not let people smoke in your car, home, or near your child  Do not stand near someone that is smoking  This includes anyone that is smoking an E-cigarrete  · Avoid anything that triggers your allergies or irritates your throat  Allergens and irritants can make your chronic cough worse  Allergens may include dust mites, pollen, pet dander, or mold  Wear a mask if you work around pollutants or irritants  Ask your healthcare provider for more ways to decrease your exposure to allergens or irritants  · Drink plenty of liquids as directed  Liquids may help relieve throat discomfort that causes you to cough  Add honey to tea or hot water to help ease your throat pain  Ask how much liquid to drink each day and which liquids are best for you  Follow up with your healthcare provider as directed: You may need to return for more tests  Your healthcare provider may refer to you other specialists  Write down your questions so you remember to ask them during your visits  © 2017 [facility] Information is for End User's use only and may not be sold, redistributed or otherwise used for commercial purposes  All illustrations and images included in CareNotes® are the copyrighted property of A D A M , Inc  or Aneudy Mccormick  The above information is an  only  It is not intended as medical advice for individual conditions or treatments  Talk to your doctor, nurse or pharmacist before following any medical regimen to see if it is safe and effective for you

## 2019-07-29 NOTE — PROGRESS NOTES
Assessment/Plan:     Problem List Items Addressed This Visit        Other    Chronic cough     Jian Victor has had a chronic cough for 2 and half years  He was last seen in the office in March 2018  Both CT of chest and sinuses was normal   He had been on gabapentin 300 mg t i d  But stopped voluntarily about 3 weeks ago  PFT today shows normal spirometry  Forced vital capacity is 3 79 L or 90% of predicted, FEV1 is 3 15 L or 96% of predicted obstruction  Ratio is 86  Complete PFT was done in the past for which results revealed normal diffusion capacity and moderate air flow obstruction  Plan includes the following:  Since that is been some time as he has had chest imaging, I will order a routine chest x-ray to be done at patient's convenience  Additionally, I will reorder his gabapentin  He knows to start this on a gradual basis for which he will take 1 pill a day for 1 week and then increase to twice in 3 times a day in same increments  If cough worsens at any point, further consideration would be for a [place] allergy panel and also CBC with differential to evaluate ease eosinophils  I personally reviewed images of his CT of chest   I also reviewed patient's medications  Likely patient 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         Postnasal drip     Jian Victor A had CT of the chest in 2017  He did have a mild hiatal hernia  Patient will have a chest x-ray  Additionally, I would like him to be evaluated for any ENT so that his airway can be evaluated  He is agreeable to the same  He does have an ear nose and throat specialist in [facility] in [place]  His name is Dr Carole Kwon  He defers an nasal corticosteroid at this time  He will follow-up with his ear nose and throat specialist   It is also possible that he does have some GERD                  Other Visit Diagnoses     Cough    -  Primary    Relevant Orders    POCT spirometry (Completed)            No follow-ups on file    All questions are answered to the patient's satisfaction and understanding  He verbalizes understanding  He is encouraged to call with any further questions or concerns  Portions of the record may have been created with voice recognition software  Occasional wrong word or "sound a like" substitutions may have occurred due to the inherent limitations of voice recognition software  Read the chart carefully and recognize, using context, where substitutions have occurred  HPI:  Sergei Shirley is a 13-year-old male who was 1st seen in the office in December of 2019  He has had a chronic cough for approximately 2 and half years  He was last seen in the office March 9, 2018  He had been on both oral prednisone and inhaled corticosteroid and had no improvement  CT of chest in sinuses were done in 2017  Both were normal   At his last visit it was noted that he had been doing better with the gabapentin  He was on 300 mg t i d   It was thought that his cough could be hypersensitivity syndrome or what is also known is sensory neuropathic cough  Junior Langley also has history of intractable migraine  Does receive Botox periodically as well as nasal spray for his migraine  According to patient, he stopped using his gabapentin approximately 3 weeks ago  He did taper himself off  He has noted that his cough is worsened  The reason he tapered off was that he knew he would be here for a visit  Patient did have pulmonary function test in September 15, 2016  At that time forced vital capacity was 4 43 L or 88% of predicted, FEV1 was 2 98 L or 75% of predicted obstruction ratio was 67  Residual volume was normal at 78 total lung capacity 83 and diffusion capacity was normal     Electronically Signed by [address]    ______________________________________________________________________    Chief Complaint:   Chief Complaint   Patient presents with    Cough     dry cough on inhalation       Patient ID: Junior Langley is a 51 y o  y o  male has a past medical history of Cubital tunnel syndrome, Depression (11/07/2005), Hyperlipidemia, Hypertension, Migraine, and Psychiatric disorder  7/29/2019  Patient presents today for follow-up visit  Cough   This is a chronic problem  The current episode started more than 1 year ago  The problem has been unchanged  The problem occurs hourly  The cough is non-productive  Associated symptoms include postnasal drip and shortness of breath  Nothing aggravates the symptoms  Risk factors for lung disease include animal exposure  He has tried nothing for the symptoms  Review of Systems   Constitutional: Negative  HENT: Positive for postnasal drip  Eyes: Negative  Respiratory: Positive for cough and shortness of breath  Cardiovascular: Negative  Gastrointestinal: Negative  Endocrine: Negative  Genitourinary: Negative  Musculoskeletal: Negative  Skin: Negative  Allergic/Immunologic: Negative  Neurological: Negative  Hematological: Negative  Psychiatric/Behavioral: Negative  Smoking history: He reports that he is a non-smoker but has been exposed to tobacco smoke  He has never used smokeless tobacco     The following portions of the patient's history were reviewed and updated as appropriate: allergies, current medications, past family history, past medical history, past social history, past surgical history and problem list     Immunization History   Administered Date(s) Administered    Tdap 02/07/2017     Current Outpatient Medications   Medication Sig Dispense Refill    AIMOVIG 140 MG/ML SOAJ       atorvastatin (LIPITOR) 20 mg tablet Take 1 tablet (20 mg total) by mouth daily 90 tablet 0    dihydroergotamine (MIGRANAL) 4 MG/ML nasal spray 1 spray into each nostril as needed for migraine Use in one nostril as directed    No more than 4 sprays in one hour 6 mL 0    Erenumab-aooe 70 MG/ML SOAJ Inject 140 mg under the skin every 30 (thirty) days 2 pen 3    escitalopram (LEXAPRO) 20 mg tablet Take 1 tablet (20 mg total) by mouth daily  0    gabapentin (NEURONTIN) 300 mg capsule Take 1 capsule (300 mg total) by mouth 3 (three) times a day 90 capsule 5    ibuprofen (MOTRIN) 600 mg tablet as needed       lamoTRIgine (LAMICTAL) 150 MG tablet Take 1 tablet (150 mg total) by mouth 2 (two) times a day  0    verapamil (VERELAN PM) 120 MG 24 hr capsule Take 1 capsule (120 mg total) by mouth daily at bedtime 30 capsule 4    fluticasone (FLONASE) 50 mcg/act nasal spray 2 sprays into each nostril daily (Patient not taking: Reported on 4/22/2019) 16 g 1     No current facility-administered medications for this visit  Allergies: Patient has no known allergies  Objective:  Vitals:    07/29/19 0804   BP: 102/60   BP Location: Left arm   Patient Position: Sitting   Cuff Size: Standard   Pulse: 70   Resp: 12   Temp: 98 4 °F (36 9 °C)   TempSrc: Tympanic   SpO2: 97%   Weight: 82 6 kg (182 lb)   Height: 5' 9" (1 753 m)   Oxygen Therapy  SpO2: 97 %    Wt Readings from Last 3 Encounters:   07/29/19 82 6 kg (182 lb)   07/22/19 83 5 kg (184 lb)   05/31/19 82 1 kg (181 lb)     Body mass index is 26 88 kg/m²  Physical Exam   Constitutional: He is oriented to person, place, and time  He appears well-developed and well-nourished  HENT:   Head: Normocephalic  Mallampati 4   Eyes: Pupils are equal, round, and reactive to light  EOM are normal    Neck: Normal range of motion  Neck supple  Cardiovascular: Normal rate and regular rhythm  Pulmonary/Chest: Effort normal    Abdominal: Soft  Musculoskeletal: Normal range of motion  Neurological: He is alert and oriented to person, place, and time  Skin: Skin is dry  Capillary refill takes less than 2 seconds  Psychiatric: He has a normal mood and affect   His behavior is normal        Lab Review:   Orders Only on 04/06/2019   Component Date Value    Glucose, Random 04/06/2019 102*    BUN 04/06/2019 13     Creatinine 04/06/2019 0 99     eGFR Non African American 04/06/2019 90     eGFR  04/06/2019 104     SL AMB BUN/CREATININE RA* 04/06/2019 13     Sodium 04/06/2019 141     Potassium 04/06/2019 4 1     Chloride 04/06/2019 103     CO2 04/06/2019 23     CALCIUM 04/06/2019 9 7     Protein, Total 04/06/2019 6 6     Albumin 04/06/2019 4 6     Globulin, Total 04/06/2019 2 0     Albumin/Globulin Ratio 04/06/2019 2 3*    TOTAL BILIRUBIN 04/06/2019 0 6     Alk Phos Isoenzymes 04/06/2019 84     AST 04/06/2019 19     ALT 04/06/2019 30     Cholesterol, Total 04/06/2019 148     Triglycerides 04/06/2019 102     HDL 04/06/2019 36*    LDL Direct 04/06/2019 92     Interpretation 04/06/2019 Note     Comment 04/06/2019 Comment        Diagnostics:  I have personally reviewed pertinent films in PACS  Office Spirometry Results:  FEV1: 3 15 liters(96%)  FVC: 3 79 liters(90%)  FEV1/FVC: 83 1 %  ESS:    No results found

## 2019-08-19 ENCOUNTER — OFFICE VISIT (OUTPATIENT)
Dept: FAMILY MEDICINE CLINIC | Facility: CLINIC | Age: 49
End: 2019-08-19
Payer: COMMERCIAL

## 2019-08-19 VITALS
WEIGHT: 185 LBS | DIASTOLIC BLOOD PRESSURE: 70 MMHG | BODY MASS INDEX: 27.4 KG/M2 | HEIGHT: 69 IN | SYSTOLIC BLOOD PRESSURE: 124 MMHG | RESPIRATION RATE: 16 BRPM | TEMPERATURE: 98.4 F | HEART RATE: 82 BPM

## 2019-08-19 DIAGNOSIS — H60.502 ACUTE OTITIS EXTERNA OF LEFT EAR, UNSPECIFIED TYPE: Primary | ICD-10-CM

## 2019-08-19 PROCEDURE — 1036F TOBACCO NON-USER: CPT | Performed by: FAMILY MEDICINE

## 2019-08-19 PROCEDURE — 99213 OFFICE O/P EST LOW 20 MIN: CPT | Performed by: FAMILY MEDICINE

## 2019-08-19 PROCEDURE — 3008F BODY MASS INDEX DOCD: CPT | Performed by: FAMILY MEDICINE

## 2019-08-19 NOTE — PROGRESS NOTES
Assessment/Plan:     Diagnoses and all orders for this visit:    Acute otitis externa of left ear, unspecified type  -     neomycin-polymyxin-hydrocortisone (CORTISPORIN) otic solution; Administer 4 drops into the left ear every 6 (six) hours  - Return if symptoms worsen or do not improve  Subjective:      Patient ID: Marleen Alvarado is a 51 y o  male  Earache    There is pain in the left ear  This is a new problem  The current episode started in the past 7 days  The problem occurs constantly  The problem has been unchanged  There has been no fever  The pain is at a severity of 8/10  Associated symptoms include ear discharge  Pertinent negatives include no abdominal pain, coughing, diarrhea, headaches, hearing loss, neck pain, rash, rhinorrhea, sore throat or vomiting  Pt was in [place] last week and swimming  Knew he would get an ear infection after the water went into his left ear  The following portions of the patient's history were reviewed and updated as appropriate: allergies, current medications, past family history, past medical history, past social history, past surgical history and problem list     Review of Systems   HENT: Positive for ear discharge and ear pain  Negative for hearing loss, rhinorrhea and sore throat  Respiratory: Negative for cough  Gastrointestinal: Negative for abdominal pain, diarrhea and vomiting  Musculoskeletal: Negative for neck pain  Skin: Negative for rash  Neurological: Negative for headaches  Objective:      /70   Pulse 82   Temp 98 4 °F (36 9 °C)   Resp 16   Ht 5' 9" (1 753 m)   Wt 83 9 kg (185 lb)   BMI 27 32 kg/m²          Physical Exam   Constitutional: He appears well-developed and well-nourished  No distress  HENT:   Head: Normocephalic and atraumatic  Right Ear: Hearing, tympanic membrane, external ear and ear canal normal    Left Ear: Hearing normal  No lacerations  There is drainage and tenderness  No mastoid tenderness  Tympanic membrane is not injected, not scarred, not perforated, not erythematous, not retracted and not bulging  Tympanic membrane mobility is normal  No hemotympanum  Nose: Nose normal    Mouth/Throat: Oropharynx is clear and moist  No oropharyngeal exudate  Unable to fully visualize left tympanic membrane due to significant discharge  Eyes: Pupils are equal, round, and reactive to light  Conjunctivae and EOM are normal    Neck: Normal range of motion  Neck supple  No JVD present  No tracheal deviation present  No thyromegaly present  Lymphadenopathy:     He has no cervical adenopathy  Skin: He is not diaphoretic

## 2019-09-05 ENCOUNTER — OFFICE VISIT (OUTPATIENT)
Dept: FAMILY MEDICINE CLINIC | Facility: CLINIC | Age: 49
End: 2019-09-05
Payer: COMMERCIAL

## 2019-09-05 VITALS
TEMPERATURE: 98.3 F | SYSTOLIC BLOOD PRESSURE: 126 MMHG | WEIGHT: 185 LBS | BODY MASS INDEX: 27.4 KG/M2 | RESPIRATION RATE: 16 BRPM | HEART RATE: 84 BPM | DIASTOLIC BLOOD PRESSURE: 74 MMHG | HEIGHT: 69 IN

## 2019-09-05 DIAGNOSIS — E78.00 HYPERCHOLESTEROLEMIA: ICD-10-CM

## 2019-09-05 DIAGNOSIS — F31.32 BIPOLAR AFFECTIVE DISORDER, CURRENTLY DEPRESSED, MODERATE (HCC): ICD-10-CM

## 2019-09-05 DIAGNOSIS — R05.3 CHRONIC COUGH: ICD-10-CM

## 2019-09-05 DIAGNOSIS — H66.92 LEFT OTITIS MEDIA, UNSPECIFIED OTITIS MEDIA TYPE: Primary | ICD-10-CM

## 2019-09-05 DIAGNOSIS — G43.709 CHRONIC MIGRAINE WITHOUT AURA WITHOUT STATUS MIGRAINOSUS, NOT INTRACTABLE: ICD-10-CM

## 2019-09-05 PROBLEM — Z13.6 SCREENING FOR CARDIOVASCULAR CONDITION: Status: RESOLVED | Noted: 2018-04-18 | Resolved: 2019-09-05

## 2019-09-05 PROCEDURE — 3008F BODY MASS INDEX DOCD: CPT | Performed by: NURSE PRACTITIONER

## 2019-09-05 PROCEDURE — 99214 OFFICE O/P EST MOD 30 MIN: CPT | Performed by: NURSE PRACTITIONER

## 2019-09-05 RX ORDER — PREDNISONE 20 MG/1
20 TABLET ORAL DAILY
Qty: 7 TABLET | Refills: 0 | Status: SHIPPED | OUTPATIENT
Start: 2019-09-05 | End: 2019-12-14

## 2019-09-05 RX ORDER — AMOXICILLIN 875 MG/1
875 TABLET, COATED ORAL 2 TIMES DAILY
Qty: 20 TABLET | Refills: 0 | Status: SHIPPED | OUTPATIENT
Start: 2019-09-05 | End: 2019-09-15

## 2019-09-05 NOTE — PATIENT INSTRUCTIONS
Take medication with food  It is important that you take the entire course of antibiotics prescribed  May also take a probiotic of your choice to maintain healthy GI ari  Can take some probiotic and yogurt with the medication  Supportive care discussed and advised  Advised to RTO for any worsening and no improvement  Follow up for no improvement and worsening of conditions  Patient advised and educated when to see immediate medical care  Take prednisone with food in morning and do not take any NSAID's while taking prednisone

## 2019-09-05 NOTE — PROGRESS NOTES
Assessment/Plan:         Diagnoses and all orders for this visit:    Left otitis media, unspecified otitis media type  -     amoxicillin (AMOXIL) 875 mg tablet; Take 1 tablet (875 mg total) by mouth 2 (two) times a day for 10 days  -     predniSONE 20 mg tablet; Take 1 tablet (20 mg total) by mouth daily    Chronic migraine without aura without status migrainosus, not intractable  Comments:  On verapamil and on ernuemab, managed by neurologist    Chronic cough  Comments:  Managed by pulmonary and on gabapentin TID    Hypercholesterolemia  Comments:  on statin and tolerating it well    Bipolar affective disorder, currently depressed, moderate ([address] )  Comments: On lamictal and managed by psychatrist          BMI Counseling: Body mass index is 27 32 kg/m²  Discussed the patient's BMI with him  The BMI is above average  BMI counseling and education was provided to the patient  Nutrition recommendations include reducing portion sizes, decreasing overall calorie intake, 3-5 servings of fruits/vegetables daily, reducing fast food intake, consuming healthier snacks, decreasing soda and/or juice intake and moderation in carbohydrate intake  Patient Instructions:  Supportive care discussed and advised  Advised to RTO for any worsening and no improvement  Follow up for no improvement and worsening of conditions  Patient advised and educated when to see immediate medical care  Take medication with food  It is important that you take the entire course of antibiotics prescribed  May also take a probiotic of your choice to maintain healthy GI ari  Can take some probiotic and yogurt with the medication  Return if symptoms worsen or fail to improve        Future Appointments   Date Time Provider Edwardo Leyva   9/13/2019  7:30 AM Ethel Luevano PA-C [facility]   9/23/2019 10:20 AM DO ABEL Hernandez [facility]   10/28/2019  9:00 AM Michael Arce MD [facility] [place] Subjective:      Patient ID: Gerri Del Toro is a 51 y o  male  Chief Complaint   Patient presents with    Earache     both-lj       HPI  Patient was seen in office couple of days for left otitis media  Stated that drops slightly helped with pain but still having ear ache and left is worse than right  Stated that always had tinnitus in left ear and it is not worse and denies any hearing changes  Denies fever, chills and sob  Complaint with medications  Vitals:  /74   Pulse 84   Temp 98 3 °F (36 8 °C)   Resp 16   Ht 5' 9" (1 753 m)   Wt 83 9 kg (185 lb)   BMI 27 32 kg/m²             The following portions of the patient's history were reviewed and updated as appropriate: allergies, current medications, past family history, past medical history, past social history, past surgical history and problem list       Review of Systems   Constitutional: Negative for chills, diaphoresis, fatigue, fever and unexpected weight change  HENT: Positive for ear pain  Negative for congestion, dental problem, drooling, ear discharge, facial swelling, hearing loss, mouth sores, nosebleeds, postnasal drip, rhinorrhea, sinus pressure, sinus pain, sneezing, sore throat, tinnitus, trouble swallowing and voice change  Respiratory: Negative for cough, chest tightness, shortness of breath and wheezing  Cardiovascular: Negative  Gastrointestinal: Negative for abdominal pain, constipation, diarrhea, nausea and vomiting  Genitourinary: Negative  Musculoskeletal: Negative  Skin: Negative  Neurological: Negative for dizziness, weakness, light-headedness and headaches  Hematological: Negative  Psychiatric/Behavioral: Negative            Objective:    Social History     Tobacco Use   Smoking Status Passive Smoke Exposure - Never Smoker   Smokeless Tobacco Never Used   Tobacco Comment    exposure to second hand smoke       Allergies: No Known Allergies      Current Outpatient Medications   Medication Sig Dispense Refill    atorvastatin (LIPITOR) 20 mg tablet Take 1 tablet (20 mg total) by mouth daily 90 tablet 0    dihydroergotamine (MIGRANAL) 4 MG/ML nasal spray 1 spray into each nostril as needed for migraine Use in one nostril as directed  No more than 4 sprays in one hour 6 mL 0    Erenumab-aooe 70 MG/ML SOAJ Inject 140 mg under the skin every 30 (thirty) days 2 pen 3    escitalopram (LEXAPRO) 20 mg tablet Take 1 tablet (20 mg total) by mouth daily  0    gabapentin (NEURONTIN) 300 mg capsule Take 1 capsule (300 mg total) by mouth 3 (three) times a day 90 capsule 5    ibuprofen (MOTRIN) 600 mg tablet as needed       lamoTRIgine (LAMICTAL) 150 MG tablet Take 1 tablet (150 mg total) by mouth 2 (two) times a day  0    verapamil (VERELAN PM) 120 MG 24 hr capsule Take 1 capsule (120 mg total) by mouth daily at bedtime 30 capsule 4    amoxicillin (AMOXIL) 875 mg tablet Take 1 tablet (875 mg total) by mouth 2 (two) times a day for 10 days 20 tablet 0    predniSONE 20 mg tablet Take 1 tablet (20 mg total) by mouth daily 7 tablet 0     No current facility-administered medications for this visit  Physical Exam   Constitutional: He is oriented to person, place, and time  He appears well-developed and well-nourished  HENT:   Head: Normocephalic  Right Ear: External ear and ear canal normal  There is tenderness  A middle ear effusion is present  Left Ear: Tympanic membrane, external ear and ear canal normal    Nose: Nose normal  Right sinus exhibits no maxillary sinus tenderness and no frontal sinus tenderness  Left sinus exhibits no maxillary sinus tenderness and no frontal sinus tenderness  Mouth/Throat: Oropharynx is clear and moist and mucous membranes are normal    Neck: Neck supple  Cardiovascular: Normal rate, regular rhythm and normal heart sounds     Pulmonary/Chest: Effort normal and breath sounds normal    Abdominal: Normal appearance and bowel sounds are normal  There is no hepatosplenomegaly  There is no tenderness  There is no rebound  Musculoskeletal: Normal range of motion  Lymphadenopathy:        Right cervical: No superficial cervical and no posterior cervical adenopathy present  Left cervical: No superficial cervical and no posterior cervical adenopathy present  Neurological: He is alert and oriented to person, place, and time  Skin: Skin is warm and dry  Psychiatric: He has a normal mood and affect  His behavior is normal  Judgment and thought content normal    Vitals reviewed                    TERESA Parrish

## 2019-09-20 ENCOUNTER — OFFICE VISIT (OUTPATIENT)
Dept: NEUROLOGY | Facility: CLINIC | Age: 49
End: 2019-09-20
Payer: COMMERCIAL

## 2019-09-20 VITALS
DIASTOLIC BLOOD PRESSURE: 80 MMHG | SYSTOLIC BLOOD PRESSURE: 127 MMHG | WEIGHT: 185 LBS | BODY MASS INDEX: 27.32 KG/M2 | HEART RATE: 105 BPM

## 2019-09-20 DIAGNOSIS — G43.709 CHRONIC MIGRAINE WITHOUT AURA WITHOUT STATUS MIGRAINOSUS, NOT INTRACTABLE: ICD-10-CM

## 2019-09-20 DIAGNOSIS — G43.109 MIGRAINE WITH AURA AND WITHOUT STATUS MIGRAINOSUS, NOT INTRACTABLE: ICD-10-CM

## 2019-09-20 PROCEDURE — 99213 OFFICE O/P EST LOW 20 MIN: CPT | Performed by: PHYSICIAN ASSISTANT

## 2019-09-20 NOTE — PROGRESS NOTES
Patient ID: Addi Osorio is a 51 y o  male  Assessment/Plan:       Problem List Items Addressed This Visit        Cardiovascular and Mediastinum    Chronic migraine without aura without status migrainosus, not intractable    Migraine with aura and without status migrainosus, not intractable           Continue 140 mg aimovig q30 days for migraine prevention  He would like to hold botox for 6 months due to recently more discomfort with procedure, however this does help headaches as noted below  He was instructed to call me right away should migraine headaches worsen  P r n  Migraine he will trial Migranal nasal spray  Side effects reviewed  He understands not to take Imitrex or any other Triptan within 24 hours of Migranal      The patient should not hesitate to call me prior to his follow up with any questions or concerns  The patient was instructed to urgently call 911 or present to the nearest emergency room with any new or worsening neurological deficits  Subjective:    HPI    Mr Tha Alonso is an extremely pleasant 50 yo male here for neurological f/u for chronic migraines  He currently has disability  The last few weeks he has had more headaches due to personal stressors  He also has a viral cold and cough/ nasal congestion which does not help  He states he will go to pcp or urgent care if needed      He continues Botox Q 3 months and Aimovig 140 mg subcu injection Q 30 days  He states he notices a huge difference in his migraines when he takes the 140 mg Aimovig verses the 70 mg    The significant the headaches are significantly improved with 140      Since starting the combination of Aimovig with Botox for the past 3-4 months he has had only 1-2 migraines per month which is significantly less frequent migraines than prior to starting the Aimovig      Aura- blurry vision in the corner of his eye (one or the other, or both)  See ophtho in [place] at least every year with normal dilated eye exam      Continues other medications:  150 mg Lamictal b i d , 120 mg 24 hr verapamil at bedtime, gabapentin 300 mg t i d  (Which is also used for cough per his history today)   He denies side effects to these medications      Previously tried Topamax, it was noted in prior office note that since Botox helped he was able to wean off of Topamax      For rescue uses migranal NS      Family history of migraines in his mother, he has 6 siblings and none of them have migraines      He has a history of several concussions in the past, most occurring in childhood, and some occurring prior to the development of migraines and 6years old  The following portions of the patient's history were reviewed and updated as appropriate:   He  has a past medical history of Cubital tunnel syndrome, Depression (11/07/2005), Hyperlipidemia, Hypertension, Migraine, and Psychiatric disorder  He   Patient Active Problem List    Diagnosis Date Noted    Postnasal drip 07/29/2019    Migraine with aura and without status migrainosus, not intractable 03/01/2019    Astigmatism of right eye 05/04/2018    Chronic migraine without aura without status migrainosus, not intractable 03/26/2018    Chronic cough 03/09/2018    Hypercholesterolemia 09/04/2012    Benign familial tremor 09/04/2012    Bipolar affective disorder, currently depressed, moderate ([facility] [address] ) 09/04/2012    Persistent insomnia of non-organic origin 09/04/2012     He  has a past surgical history that includes SHOULDER ARTHROPLASTY; Knee arthrocentesis; Elbow Arthroplasty (Bilateral); TONSILLECTOMY; Sinus surgery; and Shoulder surgery (12/06/2018)  His family history includes Alzheimer's disease in his mother; Arthritis in his family; Asperger's syndrome in his son; Breast cancer in his family and sister; Depression in his mother; Heart disease in his family; Hypertension in his family; Migraines in his mother; No Known Problems in his father    He  reports that he is a non-smoker but has been exposed to tobacco smoke  He has never used smokeless tobacco  He reports that he does not drink alcohol or use drugs  Current Outpatient Medications   Medication Sig Dispense Refill    atorvastatin (LIPITOR) 20 mg tablet Take 1 tablet (20 mg total) by mouth daily 90 tablet 0    dihydroergotamine (MIGRANAL) 4 MG/ML nasal spray 1 spray into each nostril as needed for migraine Use in one nostril as directed  No more than 4 sprays in one hour 6 mL 0    Erenumab-aooe 70 MG/ML SOAJ Inject 140 mg under the skin every 30 (thirty) days 2 pen 3    escitalopram (LEXAPRO) 20 mg tablet Take 1 tablet (20 mg total) by mouth daily  0    gabapentin (NEURONTIN) 300 mg capsule Take 1 capsule (300 mg total) by mouth 3 (three) times a day 90 capsule 5    ibuprofen (MOTRIN) 600 mg tablet as needed       lamoTRIgine (LAMICTAL) 150 MG tablet Take 1 tablet (150 mg total) by mouth 2 (two) times a day  0    predniSONE 20 mg tablet Take 1 tablet (20 mg total) by mouth daily 7 tablet 0    verapamil (VERELAN PM) 120 MG 24 hr capsule Take 1 capsule (120 mg total) by mouth daily at bedtime 30 capsule 4     No current facility-administered medications for this visit  He has No Known Allergies            Objective:    Blood pressure 127/80, pulse 105, weight 83 9 kg (185 lb)  Physical Exam    Neurological Exam  Vital signs reviewed  Well developed, well nourished  Head: Normocephalic, atraumatic  Neck: Neck flexors 5/5  CN 2-12: intact and symmetric, including EOMs which are normal b/l and PERRL  Fundi b/l are normal to crude ophthalmological examination  MSK: 5/5 t/o  ROM normal x all 4 extr  No pronator drift  Sensation: Inact to LT and temp x4 extr  Romberg negative  Reflexes: 2+ and symmetric in all 4 extr  Coordination: Nml x4 extr  Gait: Steady normal gait  ROS:    Review of Systems   Constitutional: Negative  Negative for appetite change and fever  HENT: Negative    Negative for hearing loss, tinnitus, trouble swallowing and voice change  Eyes: Negative  Negative for photophobia and pain  Respiratory: Negative  Negative for shortness of breath  Cardiovascular: Negative  Negative for palpitations  Gastrointestinal: Negative  Negative for nausea and vomiting  Endocrine: Negative  Negative for cold intolerance and heat intolerance  Genitourinary: Negative  Negative for dysuria, frequency and urgency  Musculoskeletal: Negative  Negative for myalgias and neck pain  Skin: Negative  Negative for rash  Neurological: Positive for headaches  Negative for dizziness, tremors, seizures, syncope, facial asymmetry, speech difficulty, weakness, light-headedness and numbness  Hematological: Negative  Does not bruise/bleed easily  Psychiatric/Behavioral: Negative  Negative for confusion, hallucinations and sleep disturbance  The following portions of the patient's history were reviewed and updated as appropriate: allergies, current medications/ medication history, past family history, past medical history, past social history, past surgical history and problem list     Review of systems was reviewed and otherwise unremarkable from a neurological perspective

## 2019-09-23 ENCOUNTER — OFFICE VISIT (OUTPATIENT)
Dept: PULMONOLOGY | Facility: MEDICAL CENTER | Age: 49
End: 2019-09-23
Payer: COMMERCIAL

## 2019-09-23 VITALS
SYSTOLIC BLOOD PRESSURE: 112 MMHG | WEIGHT: 186 LBS | RESPIRATION RATE: 12 BRPM | HEIGHT: 69 IN | OXYGEN SATURATION: 97 % | TEMPERATURE: 97.8 F | DIASTOLIC BLOOD PRESSURE: 80 MMHG | BODY MASS INDEX: 27.55 KG/M2 | HEART RATE: 88 BPM

## 2019-09-23 DIAGNOSIS — R05.3 CHRONIC COUGH: Primary | ICD-10-CM

## 2019-09-23 PROCEDURE — 99213 OFFICE O/P EST LOW 20 MIN: CPT | Performed by: INTERNAL MEDICINE

## 2019-09-23 NOTE — PROGRESS NOTES
Assessment/Plan        Problem List Items Addressed This Visit        Other    Chronic cough - Primary     Joselyn Benz has chronic cough and has had 80% improvement with using gabapentin 300 mg 3 times a day  He will continue this medication  He has had this cough for about 3 years now  This cough is likely due to cough hypersensitivity syndrome or also was referred to his sensory neuropathic cough    I did home to try Protonix or Prilosec over-the-counter for 2-3 weeks on a daily basis to see if this helps his cough  He is not aware of any gastric reflux but is possible he could have some silent gastric reflux  Also I did recommend he may want to follow up with his ENT physician Dr Aldo Clifton for upper laryngoscopy  He has not had any follow-up with him in the past year or more  Follow-up in January                 Follow-up (6 M) and Cough       HPI     Joselyn Benz has had a chronic cough for 2-3 years  He states this cough started around same time he started to receive Botox injections for his migraine headaches  The Botox injections which he usually gets anywhere from 6-12 weeks have helped decrease the frequency of his migraine headaches quite significantly  He now gets about 2 migraines per month  Previously he has to get the most every day  He has history of migraine headaches since he was a teenager  He is not having wheezing or shortness of breath  He does work for [employer] and has to travel to [place], [place] 5 days a week  Since being started on gabapentin 300 mg 3 times a day his cough is 80% improved  Denies any gastric reflux  Not having postnasal drainage  No history of any asthma  His cough is nonproductive  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 or which is also called sensory neuropathic cough  He never had any improvement in his cough even with trial prednisone therapy  Does have history of bipolar disorder      Past Medical History:   Diagnosis Date    Cubital tunnel syndrome last assessed 1/6/14    Depression 11/07/2005    Hyperlipidemia     Hypertension     Migraine     Psychiatric disorder        Past Surgical History:   Procedure Laterality Date    ELBOW ARTHROPLASTY Bilateral     right elbow reconstruction surgery and transposition surgery of the left elbow    KNEE ARTHROCENTESIS      SHOULDER ARTHROPLASTY      SHOULDER SURGERY  12/06/2018    AC Joint- Left    SINUS SURGERY      with bone spur removed in uvulectomy done  Had [facility] at the time, Dr Giacomo Frank ENT    TONSILLECTOMY      tonsils removed and also removal of uvula, then a few years ago by Dr Giacomo Frank  At that time he was having frequent sinus and throat infections, last assessed 12/19/16         Current Outpatient Medications:     escitalopram (LEXAPRO) 20 mg tablet, Take 1 tablet (20 mg total) by mouth daily, Disp: , Rfl: 0    gabapentin (NEURONTIN) 300 mg capsule, Take 1 capsule (300 mg total) by mouth 3 (three) times a day, Disp: 90 capsule, Rfl: 5    ibuprofen (MOTRIN) 600 mg tablet, as needed , Disp: , Rfl:     lamoTRIgine (LAMICTAL) 150 MG tablet, Take 1 tablet (150 mg total) by mouth 2 (two) times a day, Disp: , Rfl: 0    verapamil (VERELAN PM) 120 MG 24 hr capsule, Take 1 capsule (120 mg total) by mouth daily at bedtime, Disp: 30 capsule, Rfl: 4    atorvastatin (LIPITOR) 20 mg tablet, Take 1 tablet (20 mg total) by mouth daily, Disp: 90 tablet, Rfl: 0    azithromycin (ZITHROMAX) 250 mg tablet, Take 500mg on day 1, 250mg on days 2-5, Disp: 6 tablet, Rfl: 0    dihydroergotamine (MIGRANAL) 4 MG/ML nasal spray, 1 spray into each nostril as needed for migraine Use in one nostril as directed    No more than 4 sprays in one hour, Disp: 6 mL, Rfl: 0    Erenumab-aooe 140 MG/ML SOAJ, Inject 140 mg under the skin every 30 (thirty) days, Disp: 1 pen, Rfl: 11    No Known Allergies    Social History     Tobacco Use    Smoking status: Passive Smoke Exposure - Never Smoker    Smokeless tobacco: Never Used   [facility] Tobacco comment: exposure to second hand smoke   Substance Use Topics    Alcohol use: No         Family History   Problem Relation Age of Onset   Ly Adams Migraines Mother     Depression Mother     Alzheimer's disease Mother     No Known Problems Father     Arthritis Family     Breast cancer Family     Heart disease Family         cardiac disorder    Hypertension Family     Breast cancer Sister     Asperger's syndrome Son        Review of Systems   Constitutional: Negative for activity change, appetite change and fatigue  HENT: Negative for congestion and postnasal drip  Respiratory: Positive for cough  Negative for shortness of breath  Cardiovascular: Negative for chest pain and leg swelling  Gastrointestinal: Negative for abdominal pain and anal bleeding  No gastric reflux  Endocrine: Negative for polydipsia  Musculoskeletal: Negative for myalgias  Neurological: Negative for light-headedness  Psychiatric/Behavioral: Negative for decreased concentration  Vitals:    09/23/19 1001   BP: 112/80   Pulse: 88   Resp: 12   Temp: 97 8 °F (36 6 °C)   SpO2: 97%           Physical Exam   Constitutional: He is oriented to person, place, and time  He appears well-developed and well-nourished  No distress  HENT:   Head: Normocephalic  Nose: Nose normal    Mouth/Throat: Oropharynx is clear and moist  No oropharyngeal exudate  No erythema of oropharynx  Eyes: Pupils are equal, round, and reactive to light  Conjunctivae are normal    Cardiovascular: Normal rate, regular rhythm and normal heart sounds  Pulmonary/Chest: Effort normal    Lung sounds are clear to auscultation  No wheezes, crackles or rhonchi   Abdominal: Soft  He exhibits no distension  There is no tenderness  Musculoskeletal:   No edema   Neurological: He is alert and oriented to person, place, and time  Skin: Skin is warm and dry  Psychiatric: He has a normal mood and affect

## 2019-09-23 NOTE — PATIENT INSTRUCTIONS
Try Protonix or Prilosec over-the-counter for 2-3 weeks straight  Take at dinner time    Call us if this helps    Continue gabapentin 300 mg 1 tablet 3 times a day    There is a new medication  for cough in the next 1 year    I will review our medical literature for any connection between cough and Botox injections

## 2019-09-23 NOTE — ASSESSMENT & PLAN NOTE
Christophe Killian has chronic cough and has had 80% improvement with using gabapentin 300 mg 3 times a day  He will continue this medication  He has had this cough for about 3 years now  This cough is likely due to cough hypersensitivity syndrome or also was referred to his sensory neuropathic cough    I did home to try Protonix or Prilosec over-the-counter for 2-3 weeks on a daily basis to see if this helps his cough  He is not aware of any gastric reflux but is possible he could have some silent gastric reflux  Also I did recommend he may want to follow up with his ENT physician Dr Saima Gillis for upper laryngoscopy  He has not had any follow-up with him in the past year or more      Follow-up in January

## 2019-09-26 DIAGNOSIS — E78.00 HYPERCHOLESTEROLEMIA: ICD-10-CM

## 2019-09-26 RX ORDER — ATORVASTATIN CALCIUM 20 MG/1
20 TABLET, FILM COATED ORAL DAILY
Qty: 90 TABLET | Refills: 0 | Status: SHIPPED | OUTPATIENT
Start: 2019-09-26 | End: 2020-07-07 | Stop reason: SDUPTHER

## 2019-10-08 ENCOUNTER — DOCUMENTATION (OUTPATIENT)
Dept: NEUROLOGY | Facility: CLINIC | Age: 49
End: 2019-10-08

## 2019-10-08 NOTE — PROGRESS NOTES
Type Date User Summary Attachment   General 10/07/2019  3:31 PM Diane Davidson care coordination  -   Note    Botox- authorization #: [id number]- 3rd visit- valid from 4/22/2019 until 4/22/2020   Please use our stock      Thank you,     Dhaval Whelan

## 2019-12-14 ENCOUNTER — OFFICE VISIT (OUTPATIENT)
Dept: FAMILY MEDICINE CLINIC | Facility: CLINIC | Age: 49
End: 2019-12-14
Payer: COMMERCIAL

## 2019-12-14 VITALS
DIASTOLIC BLOOD PRESSURE: 70 MMHG | SYSTOLIC BLOOD PRESSURE: 104 MMHG | HEART RATE: 96 BPM | TEMPERATURE: 99.4 F | BODY MASS INDEX: 27.91 KG/M2 | RESPIRATION RATE: 16 BRPM | WEIGHT: 189 LBS

## 2019-12-14 DIAGNOSIS — J01.00 ACUTE NON-RECURRENT MAXILLARY SINUSITIS: Primary | ICD-10-CM

## 2019-12-14 DIAGNOSIS — G43.109 MIGRAINE WITH AURA AND WITHOUT STATUS MIGRAINOSUS, NOT INTRACTABLE: ICD-10-CM

## 2019-12-14 DIAGNOSIS — F31.32 BIPOLAR AFFECTIVE DISORDER, CURRENTLY DEPRESSED, MODERATE (HCC): ICD-10-CM

## 2019-12-14 DIAGNOSIS — R05.3 CHRONIC COUGH: ICD-10-CM

## 2019-12-14 PROCEDURE — 99214 OFFICE O/P EST MOD 30 MIN: CPT | Performed by: FAMILY MEDICINE

## 2019-12-14 PROCEDURE — 1036F TOBACCO NON-USER: CPT | Performed by: FAMILY MEDICINE

## 2019-12-14 RX ORDER — AZITHROMYCIN 250 MG/1
TABLET, FILM COATED ORAL
Qty: 6 TABLET | Refills: 0 | Status: SHIPPED | OUTPATIENT
Start: 2019-12-14 | End: 2019-12-19

## 2019-12-14 NOTE — PROGRESS NOTES
Assessment/Plan:    No problem-specific Assessment & Plan notes found for this encounter  Sinusitis new  Watch bp if has problems since caution with zpack and verapamil  Verapamil is for headaches, stable  Bipolar and chronic cough stable  On gabapentin for cough     Diagnoses and all orders for this visit:    Acute non-recurrent maxillary sinusitis  -     azithromycin (ZITHROMAX) 250 mg tablet; Take 500mg on day 1, 250mg on days 2-5    Migraine with aura and without status migrainosus, not intractable    Chronic cough    Bipolar affective disorder, currently depressed, moderate (HCC)        Return if symptoms worsen or fail to improve  Subjective:      Patient ID: Eren Amin is a 51 y o  male  Chief Complaint   Patient presents with    Sinus Problem     Started a few days ago with sinus pressure, congestion and cough  NO known fever jMoyle LPN       HPI  Chronic cough  Been to pulm and ENT  On gabapentin  Thinks it helps    6d  Sinus pressure  Mucus is lime green  mucinex tried  Feverish  No wheeze  No dysphagia    The following portions of the patient's history were reviewed and updated as appropriate: allergies, current medications, past family history, past medical history, past social history, past surgical history and problem list     Review of Systems   Respiratory: Positive for cough  Neurological: Positive for headaches  Negative for dizziness  Current Outpatient Medications   Medication Sig Dispense Refill    atorvastatin (LIPITOR) 20 mg tablet Take 1 tablet (20 mg total) by mouth daily 90 tablet 0    dihydroergotamine (MIGRANAL) 4 MG/ML nasal spray 1 spray into each nostril as needed for migraine Use in one nostril as directed    No more than 4 sprays in one hour 6 mL 0    Erenumab-aooe 70 MG/ML SOAJ Inject 140 mg under the skin every 30 (thirty) days 2 pen 3    escitalopram (LEXAPRO) 20 mg tablet Take 1 tablet (20 mg total) by mouth daily  0    gabapentin (NEURONTIN) 300 mg capsule Take 1 capsule (300 mg total) by mouth 3 (three) times a day 90 capsule 5    ibuprofen (MOTRIN) 600 mg tablet as needed       lamoTRIgine (LAMICTAL) 150 MG tablet Take 1 tablet (150 mg total) by mouth 2 (two) times a day  0    verapamil (VERELAN PM) 120 MG 24 hr capsule Take 1 capsule (120 mg total) by mouth daily at bedtime 30 capsule 4    azithromycin (ZITHROMAX) 250 mg tablet Take 500mg on day 1, 250mg on days 2-5 6 tablet 0     No current facility-administered medications for this visit  Objective:    /70   Pulse 96   Temp 99 4 °F (37 4 °C)   Resp 16   Wt 85 7 kg (189 lb)   BMI 27 91 kg/m²        Physical Exam   Constitutional: He appears well-developed  No distress  HENT:   Head: Normocephalic  Right Ear: External ear normal    Left Ear: External ear normal    Sinuses tender to percussion, nasal turbinates visualized and appear red and swollen     Eyes: Conjunctivae are normal  No scleral icterus  Neck: Neck supple  No tracheal deviation present  Cardiovascular: Normal rate, regular rhythm and intact distal pulses  Pulmonary/Chest: Effort normal and breath sounds normal  No respiratory distress  He has no wheezes  Abdominal: Soft  Bowel sounds are normal  He exhibits no distension  There is no tenderness  Musculoskeletal: He exhibits no edema or deformity  Lymphadenopathy:     He has no cervical adenopathy  Neurological: He is alert  Skin: Skin is warm and dry  No pallor  Psychiatric: His behavior is normal  Thought content normal    Nursing note and vitals reviewed               Babar Hawley DO

## 2019-12-27 ENCOUNTER — OFFICE VISIT (OUTPATIENT)
Dept: NEUROLOGY | Facility: CLINIC | Age: 49
End: 2019-12-27
Payer: COMMERCIAL

## 2019-12-27 VITALS — HEART RATE: 76 BPM | SYSTOLIC BLOOD PRESSURE: 133 MMHG | DIASTOLIC BLOOD PRESSURE: 80 MMHG

## 2019-12-27 DIAGNOSIS — G43.709 CHRONIC MIGRAINE WITHOUT AURA WITHOUT STATUS MIGRAINOSUS, NOT INTRACTABLE: Primary | ICD-10-CM

## 2019-12-27 DIAGNOSIS — G43.109 MIGRAINE WITH AURA AND WITHOUT STATUS MIGRAINOSUS, NOT INTRACTABLE: ICD-10-CM

## 2019-12-27 PROCEDURE — 99214 OFFICE O/P EST MOD 30 MIN: CPT | Performed by: PHYSICIAN ASSISTANT

## 2019-12-27 RX ORDER — DIHYDROERGOTAMINE MESYLATE 4 MG/ML
1 SPRAY NASAL AS NEEDED
Qty: 6 ML | Refills: 0 | Status: SHIPPED | OUTPATIENT
Start: 2019-12-27 | End: 2021-11-17

## 2019-12-27 NOTE — PROGRESS NOTES
Patient ID: Les Lambert is a 51 y o  male  Assessment/Plan:     Diagnoses and all orders for this visit:    Chronic migraine without aura without status migrainosus, not intractable  -     Erenumab-aooe 140 MG/ML SOAJ; Inject 140 mg under the skin every 30 (thirty) days  -     dihydroergotamine (MIGRANAL) 4 MG/ML nasal spray; 1 spray into each nostril as needed for migraine Use in one nostril as directed  No more than 4 sprays in one hour    Migraine with aura and without status migrainosus, not intractable  -     dihydroergotamine (MIGRANAL) 4 MG/ML nasal spray; 1 spray into each nostril as needed for migraine Use in one nostril as directed  No more than 4 sprays in one hour       Continue 140 mg aimovig q30 days for migraine prevention  P r n  Migraine he will continue Migranal nasal spray  He understands not to take Imitrex or any other Triptan within 24 hours of Migranal   He only uses migranal about 1-2 times per month at most     For migraine prevention continue verapamil 24 hr cap- 120 mg q h s , and he takes Lamictal and gabapentin for other reasons but also could be helping his headaches      The patient should not hesitate to call me prior to his follow up with any questions or concerns  The patient was instructed to urgently call 911 or present to the nearest emergency room with any new or worsening neurological deficits  Subjective:    HPI     Mr Tha Alonso is an extremely pleasant 49 yo male here for neurological f/u for chronic migraines   He currently has disability  The patient's father passed away yesterday  He has good family/ friend support  He stopped botox and last procedure was 7/22/19  He reports no worsening of migraines since then  He has approximately 1-2 severe migraines per month for which he takes Migranal nasal spray and this helps significantly    He denies side effects to Migranal     He continues Aimovig 140 mg subcu injection Q 30 days  [facility] states he notices a huge difference in his migraines when he takes the 140 mg Aimovig verses the 70 mg   The headaches are reduced by >50% with 140mg q30 days  Aura- blurry vision in the corner of his eye (one or the other, or both)  See ophtho in [place] at least every year with normal dilated eye exam      Continues other medications:  150 mg Lamictal b i d , 120 mg 24 hr verapamil at bedtime, gabapentin 300 mg t i d  (Which is also used for cough per his history today)   He denies side effects to these medications      Previously tried Topamax and weaned off  For rescue uses migranal NS      Family history of migraines in his mother, he has 6 siblings and none of them have migraines      He has a history of several concussions in the past, most occurring in childhood, and some occurring prior to the development of migraines and 6years old  The following portions of the patient's history were reviewed and updated as appropriate:   He  has a past medical history of Cubital tunnel syndrome, Depression (11/07/2005), Hyperlipidemia, Hypertension, Migraine, and Psychiatric disorder  He   Patient Active Problem List    Diagnosis Date Noted    Acute non-recurrent maxillary sinusitis 12/14/2019    Postnasal drip 07/29/2019    Migraine with aura and without status migrainosus, not intractable 03/01/2019    Astigmatism of right eye 05/04/2018    Chronic migraine without aura without status migrainosus, not intractable 03/26/2018    Chronic cough 03/09/2018    Hypercholesterolemia 09/04/2012    Benign familial tremor 09/04/2012    Bipolar affective disorder, currently depressed, moderate ([facility] [address] ) 09/04/2012    Persistent insomnia of non-organic origin 09/04/2012     He  has a past surgical history that includes SHOULDER ARTHROPLASTY; Knee arthrocentesis; Elbow Arthroplasty (Bilateral); TONSILLECTOMY; Sinus surgery; and Shoulder surgery (12/06/2018)    His family history includes Alzheimer's disease in his mother; Arthritis in his family; Asperger's syndrome in his son; Breast cancer in his family and sister; Depression in his mother; Heart disease in his family; Hypertension in his family; Migraines in his mother; No Known Problems in his father  He  reports that he is a non-smoker but has been exposed to tobacco smoke  He has never used smokeless tobacco  He reports that he does not drink alcohol or use drugs  Current Outpatient Medications   Medication Sig Dispense Refill    atorvastatin (LIPITOR) 20 mg tablet Take 1 tablet (20 mg total) by mouth daily 90 tablet 0    dihydroergotamine (MIGRANAL) 4 MG/ML nasal spray 1 spray into each nostril as needed for migraine Use in one nostril as directed  No more than 4 sprays in one hour 6 mL 0    escitalopram (LEXAPRO) 20 mg tablet Take 1 tablet (20 mg total) by mouth daily  0    gabapentin (NEURONTIN) 300 mg capsule Take 1 capsule (300 mg total) by mouth 3 (three) times a day 90 capsule 5    ibuprofen (MOTRIN) 600 mg tablet as needed       lamoTRIgine (LAMICTAL) 150 MG tablet Take 1 tablet (150 mg total) by mouth 2 (two) times a day  0    verapamil (VERELAN PM) 120 MG 24 hr capsule Take 1 capsule (120 mg total) by mouth daily at bedtime 30 capsule 4    Erenumab-aooe 140 MG/ML SOAJ Inject 140 mg under the skin every 30 (thirty) days 1 pen 11     No current facility-administered medications for this visit  He has No Known Allergies            Objective:    Blood pressure 133/80, pulse 76  Physical Exam    Neurological Exam  Vital signs reviewed  Well developed, well nourished  Head: Normocephalic, atraumatic  Neck: Neck flexors 5/5  CN 2-12: intact and symmetric, including EOMs which are normal b/l and PERRL  Fundi b/l are normal to crude ophthalmological examination  MSK: 5/5 t/o  ROM normal x all 4 extr  Sensation: Inact to LT and temp x4 extr  Reflexes: 2+ and symmetric in all 4 extr  Coordination: Nml x4 extr  Gait: Steady normal gait      ROS:    Review of Systems Constitutional: Negative  Negative for appetite change and fever  HENT: Negative  Negative for hearing loss, tinnitus, trouble swallowing and voice change  Eyes: Negative  Negative for photophobia and pain  Respiratory: Negative  Negative for shortness of breath  Cardiovascular: Negative  Negative for palpitations  Gastrointestinal: Negative  Negative for nausea and vomiting  Endocrine: Negative  Negative for cold intolerance and heat intolerance  Genitourinary: Negative  Negative for dysuria, frequency and urgency  Musculoskeletal: Negative  Negative for myalgias and neck pain  Skin: Negative  Negative for rash  Neurological: Positive for headaches  Negative for dizziness, tremors, seizures, syncope, facial asymmetry, speech difficulty, weakness, light-headedness and numbness  Hematological: Negative  Does not bruise/bleed easily  Psychiatric/Behavioral: Negative  Negative for confusion, hallucinations and sleep disturbance  The following portions of the patient's history were reviewed and updated as appropriate: allergies, current medications/ medication history, past family history, past medical history, past social history, past surgical history and problem list     Review of systems was reviewed and otherwise unremarkable from a neurological perspective

## 2020-01-02 DIAGNOSIS — G43.709 CHRONIC MIGRAINE WITHOUT AURA WITHOUT STATUS MIGRAINOSUS, NOT INTRACTABLE: ICD-10-CM

## 2020-01-03 RX ORDER — ERENUMAB-AOOE 140 MG/ML
INJECTION, SOLUTION SUBCUTANEOUS
Qty: 1 ML | Refills: 0 | OUTPATIENT
Start: 2020-01-03

## 2020-01-09 ENCOUNTER — OFFICE VISIT (OUTPATIENT)
Dept: FAMILY MEDICINE CLINIC | Facility: CLINIC | Age: 50
End: 2020-01-09
Payer: COMMERCIAL

## 2020-01-09 VITALS
HEIGHT: 69 IN | WEIGHT: 184 LBS | OXYGEN SATURATION: 96 % | HEART RATE: 70 BPM | RESPIRATION RATE: 16 BRPM | TEMPERATURE: 98 F | BODY MASS INDEX: 27.25 KG/M2 | SYSTOLIC BLOOD PRESSURE: 120 MMHG | DIASTOLIC BLOOD PRESSURE: 80 MMHG

## 2020-01-09 DIAGNOSIS — J01.90 ACUTE SINUSITIS, RECURRENCE NOT SPECIFIED, UNSPECIFIED LOCATION: Primary | ICD-10-CM

## 2020-01-09 PROCEDURE — 99213 OFFICE O/P EST LOW 20 MIN: CPT | Performed by: NURSE PRACTITIONER

## 2020-01-09 PROCEDURE — 3008F BODY MASS INDEX DOCD: CPT | Performed by: NURSE PRACTITIONER

## 2020-01-09 RX ORDER — AZITHROMYCIN 250 MG/1
TABLET, FILM COATED ORAL
Qty: 6 TABLET | Refills: 0 | Status: SHIPPED | OUTPATIENT
Start: 2020-01-09 | End: 2020-01-14

## 2020-01-09 NOTE — PROGRESS NOTES
Assessment/Plan:    1  Acute sinusitis, recurrence not specified, unspecified location  -     azithromycin (ZITHROMAX) 250 mg tablet; Take 500mg on day 1, 250mg on days 2-5          BMI Counseling: Body mass index is 27 57 kg/m²  Discussed the patient's BMI with him  The BMI is above normal  Nutrition recommendations include reducing portion sizes, decreasing overall calorie intake, 3-5 servings of fruits/vegetables daily, reducing fast food intake, consuming healthier snacks, decreasing soda and/or juice intake and moderation in carbohydrate intake  Patient Instructions: Take medication with food  It is important that you take the entire course of antibiotics prescribed  May also take a probiotic of your choice to maintain healthy GI ari  Can take some probiotic and yogurt with the medication  Increase fluid intake, saline nasal rinses, and hot tea with honey and lemon  Cool air humidification can be helpful as well  May take Ibuprofen or Tylenol as needed for pain or fevers  Mucinex D for sinus congestion or Coricidin HBP if you have high blood pressure or a heart condition  Mucinex or Robitussin DM are effective for cough and chest congestion  Supportive care discussed and advised  Advised to RTO for any worsening and no improvement  Follow up for no improvement and worsening of conditions  Patient advised and educated when to see immediate medical care  Return if symptoms worsen or fail to improve  Future Appointments   Date Time Provider Edwardo Leyva   1/13/2020 10:00 AM DO ABEL Wilson [facility]   3/23/2020  8:15 AM Addie Sneed PA-C [facility]   7/17/2020  8:45 AM Addie Sneed PA-C [facility]           Subjective:      Patient ID: Jesica Dixon is a 51 y o  male      Chief Complaint   Patient presents with    Cold Like Symptoms     for the past 4 to 5 days [id number]          Vitals:  /80   Pulse 70   Temp 98 °F (36 7 °C)   Resp 16   Ht 5' 8 5" (1 74 m)   Wt 83 5 kg (184 lb)   SpO2 96%   BMI 27 57 kg/m²     HPI  Patient stated that started with congestion couple of days ago and progressed to sinus pressure and ear ache  Denies fever, chills and sob  Not taking any OTC  Complaint with his medications  PHQ-9 Depression Screening    PHQ-9:    Frequency of the following problems over the past two weeks:       Little interest or pleasure in doing things:  0 - not at all  Feeling down, depressed, or hopeless:  3 - nearly every day  Trouble falling or staying asleep, or sleeping too much:  0 - not at all  Feeling tired or having little energy:  0 - not at all  Poor appetite or overeatin - not at all  Feeling bad about yourself - or that you are a failure or have let yourself or your family down:  0 - not at all  Trouble concentrating on things, such as reading the newspaper or watching television:  0 - not at all  Moving or speaking so slowly that other people could have noticed  Or the opposite - being so fidgety or restless that you have been moving around a lot more than usual:  0 - not at all  Thoughts that you would be better off dead, or of hurting yourself in some way:  0 - not at all  PHQ-2 Score:  3  PHQ-9 Score:  3             The following portions of the patient's history were reviewed and updated as appropriate: allergies, current medications, past family history, past medical history, past social history, past surgical history and problem list       Review of Systems   Constitutional: Negative for chills, diaphoresis, fatigue, fever and unexpected weight change  HENT: Positive for congestion, ear pain and sinus pressure  Negative for dental problem, drooling, ear discharge, facial swelling, hearing loss, mouth sores, nosebleeds, postnasal drip, rhinorrhea, sinus pain, sneezing, sore throat, tinnitus, trouble swallowing and voice change      Respiratory: Negative for cough, chest tightness, shortness of breath and wheezing  Cardiovascular: Negative  Gastrointestinal: Negative for abdominal pain, constipation, diarrhea, nausea and vomiting  Musculoskeletal: Negative  Skin: Negative  Neurological: Negative for dizziness, weakness, light-headedness and headaches  Hematological: Negative  Objective:    Social History     Tobacco Use   Smoking Status Passive Smoke Exposure - Never Smoker   Smokeless Tobacco Never Used   Tobacco Comment    exposure to second hand smoke       Allergies: No Known Allergies      Current Outpatient Medications   Medication Sig Dispense Refill    atorvastatin (LIPITOR) 20 mg tablet Take 1 tablet (20 mg total) by mouth daily 90 tablet 0    dihydroergotamine (MIGRANAL) 4 MG/ML nasal spray 1 spray into each nostril as needed for migraine Use in one nostril as directed  No more than 4 sprays in one hour 6 mL 0    Erenumab-aooe 140 MG/ML SOAJ Inject 140 mg under the skin every 30 (thirty) days 1 pen 11    escitalopram (LEXAPRO) 20 mg tablet Take 1 tablet (20 mg total) by mouth daily  0    gabapentin (NEURONTIN) 300 mg capsule Take 1 capsule (300 mg total) by mouth 3 (three) times a day 90 capsule 5    ibuprofen (MOTRIN) 600 mg tablet as needed       lamoTRIgine (LAMICTAL) 150 MG tablet Take 1 tablet (150 mg total) by mouth 2 (two) times a day  0    verapamil (VERELAN PM) 120 MG 24 hr capsule Take 1 capsule (120 mg total) by mouth daily at bedtime 30 capsule 4    azithromycin (ZITHROMAX) 250 mg tablet Take 500mg on day 1, 250mg on days 2-5 6 tablet 0     No current facility-administered medications for this visit  Physical Exam   Constitutional: He is oriented to person, place, and time  He appears well-developed and well-nourished  HENT:   Head: Normocephalic  Right Ear: Tympanic membrane, external ear and ear canal normal    Left Ear: Tympanic membrane, external ear and ear canal normal    Nose: Mucosal edema present   Right sinus exhibits maxillary sinus tenderness  Right sinus exhibits no frontal sinus tenderness  Left sinus exhibits maxillary sinus tenderness  Left sinus exhibits no frontal sinus tenderness  Mouth/Throat: Mucous membranes are normal  Posterior oropharyngeal erythema present  Neck: Neck supple  Cardiovascular: Normal rate, regular rhythm and normal heart sounds  Pulmonary/Chest: Effort normal and breath sounds normal    Abdominal: Normal appearance and bowel sounds are normal  There is no hepatosplenomegaly  There is no tenderness  There is no rebound  Musculoskeletal: Normal range of motion  Lymphadenopathy:        Right cervical: No superficial cervical and no posterior cervical adenopathy present  Left cervical: No superficial cervical and no posterior cervical adenopathy present  Neurological: He is alert and oriented to person, place, and time  Skin: Skin is warm and dry  Psychiatric: He has a normal mood and affect  His behavior is normal  Judgment and thought content normal    Vitals reviewed                    Oralee TERESA Singleton

## 2020-01-09 NOTE — PATIENT INSTRUCTIONS
Take medication with food  It is important that you take the entire course of antibiotics prescribed  May also take a probiotic of your choice to maintain healthy GI ari  Can take some probiotic and yogurt with the medication  Increase fluid intake, saline nasal rinses, and hot tea with honey and lemon  Cool air humidification can be helpful as well  May take Ibuprofen or Tylenol as needed for pain or fevers  Mucinex D for sinus congestion or Coricidin HBP if you have high blood pressure or a heart condition  Mucinex or Robitussin DM are effective for cough and chest congestion  Supportive care discussed and advised  Advised to RTO for any worsening and no improvement  Follow up for no improvement and worsening of conditions  Patient advised and educated when to see immediate medical care

## 2020-01-13 ENCOUNTER — OFFICE VISIT (OUTPATIENT)
Dept: PULMONOLOGY | Facility: MEDICAL CENTER | Age: 50
End: 2020-01-13
Payer: COMMERCIAL

## 2020-01-13 VITALS
WEIGHT: 187 LBS | OXYGEN SATURATION: 97 % | DIASTOLIC BLOOD PRESSURE: 76 MMHG | BODY MASS INDEX: 27.7 KG/M2 | RESPIRATION RATE: 12 BRPM | TEMPERATURE: 98.4 F | HEART RATE: 77 BPM | SYSTOLIC BLOOD PRESSURE: 110 MMHG | HEIGHT: 69 IN

## 2020-01-13 DIAGNOSIS — R05.3 CHRONIC COUGH: Primary | ICD-10-CM

## 2020-01-13 PROCEDURE — 99213 OFFICE O/P EST LOW 20 MIN: CPT | Performed by: INTERNAL MEDICINE

## 2020-01-13 NOTE — ASSESSMENT & PLAN NOTE
Chronic cough for past 3 years  He has had significant improvement cough with use of gabapentin 300 mg b i d  And I suspect it is possible he has sensory neuropathic cough or wheeze also referred to his cough hypersensitivity syndrome  The other possibility could be silent gastric reflux causing irritation upper airway and cough  He is scheduled to have upper endoscopy done by gastroenterologist Dr Oswaldo Larry in the next week or so  I told felt to contact me with the results of his procedure  He has had significant improvement in his cough with use of gabapentin 300 mg b i d  And is 80% of what it usually is like    He will continue this medication

## 2020-01-13 NOTE — PROGRESS NOTES
Assessment/Plan        Problem List Items Addressed This Visit        Other    Chronic cough - Primary     Chronic cough for past 3 years  He has had significant improvement cough with use of gabapentin 300 mg b i d  And I suspect it is possible he has sensory neuropathic cough or wheeze also referred to his cough hypersensitivity syndrome  The other possibility could be silent gastric reflux causing irritation upper airway and cough  He is scheduled to have upper endoscopy done by gastroenterologist Dr Yifan Mayfield in the next week or so  I told felt to contact me with the results of his procedure  He has had significant improvement in his cough with use of gabapentin 300 mg b i d  And is 80% of what it usually is like  He will continue this medication                 Follow-up (Chronic cough) and Cough ("lime green")      HPI    Patient has history of chronic cough for 3 years  Also has history of migraine headaches for which she has received Botox injections  He also has history of bipolar disorder  No history of asthma  His chronic cough is likely due to cough hypersensitivity syndrome or would is also read her to as sensory neuropathic cough  He has had trial prednisone and past with no improvement  He was placed on gabapentin 300 mg up to 3 times a day for is a cough and was 80% improvement  He is not having any symptoms of postnasal drainage or gastric reflux  Patient was seen by his primary medical practice in  January 9 and diagnosed with acute sinusitis  He was prescribed a 5 day [facility]  There is also instructed to use Mucinex D for his cough  He states his symptoms are significant better  He had some yellow mucus discharge from his nose and nasal pressure  He states his symptoms are a lot better  No fever or chills  He is not have any wheezing or shortness of breath    He has seen ENT physician Dr Matt Hallman since his last visit with me  Ryan Sorenson says that he had laryngoscopy done within the last 6 months and the ENT physician thought he may have been having some gastric reflux  He was referred to Dr Kenneth Navarro, a gastroenterologist   He also had some type of upper GI study which was somewhat abnormal   He is scheduled for upper endoscopy within the next week or so  He is not on any an acid  Does have history of hypertension  He did have prior sinus surgery  CT of sinuses done August 2017 showed no evidence of any chronic sinus disease  Also CT of chest done some day showed only small hiatal hernia otherwise lung parenchyma was normal   He did have a chest x-ray done July 2018 showed some mild linear atelectasis left lower lobe  Past Medical History:   Diagnosis Date    Cubital tunnel syndrome     last assessed 1/6/14    Depression 11/07/2005    Hyperlipidemia     Hypertension     Migraine     Psychiatric disorder        Past Surgical History:   Procedure Laterality Date    ELBOW ARTHROPLASTY Bilateral     right elbow reconstruction surgery and transposition surgery of the left elbow    KNEE ARTHROCENTESIS      SHOULDER ARTHROPLASTY      SHOULDER SURGERY  12/06/2018    AC Joint- Left    SINUS SURGERY      with bone spur removed in uvulectomy done  Had [facility] at the time, Dr Elida Carrera ENT    TONSILLECTOMY      tonsils removed and also removal of uvula, then a few years ago by Dr Elida Carrera  At that time he was having frequent sinus and throat infections, last assessed 12/19/16         Current Outpatient Medications:     atorvastatin (LIPITOR) 20 mg tablet, Take 1 tablet (20 mg total) by mouth daily, Disp: 90 tablet, Rfl: 0    azithromycin (ZITHROMAX) 250 mg tablet, Take 500mg on day 1, 250mg on days 2-5, Disp: 6 tablet, Rfl: 0    dihydroergotamine (MIGRANAL) 4 MG/ML nasal spray, 1 spray into each nostril as needed for migraine Use in one nostril as directed    No more than 4 sprays in one hour, Disp: 6 mL, Rfl: 0    Erenumab-aooe 140 MG/ML SOAJ, Inject 140 mg under the skin every 30 (thirty) days, Disp: 1 pen, Rfl: 11    escitalopram (LEXAPRO) 20 mg tablet, Take 1 tablet (20 mg total) by mouth daily, Disp: , Rfl: 0    gabapentin (NEURONTIN) 300 mg capsule, Take 1 capsule (300 mg total) by mouth 3 (three) times a day, Disp: 90 capsule, Rfl: 5    ibuprofen (MOTRIN) 600 mg tablet, as needed , Disp: , Rfl:     lamoTRIgine (LAMICTAL) 150 MG tablet, Take 1 tablet (150 mg total) by mouth 2 (two) times a day, Disp: , Rfl: 0    verapamil (VERELAN PM) 120 MG 24 hr capsule, Take 1 capsule (120 mg total) by mouth daily at bedtime, Disp: 30 capsule, Rfl: 4    No Known Allergies    Social History     Tobacco Use    Smoking status: Passive Smoke Exposure - Never Smoker    Smokeless tobacco: Never Used    Tobacco comment: exposure to second hand smoke   Substance Use Topics    Alcohol use: No         Family History   Problem Relation Age of Onset    Migraines Mother     Depression Mother     Alzheimer's disease Mother     No Known Problems Father     Arthritis Family     Breast cancer Family     Heart disease Family         cardiac disorder    Hypertension Family     Breast cancer Sister     Asperger's syndrome Son        Review of Systems   Constitutional: Negative for chills, fever and unexpected weight change  HENT: Negative for congestion, rhinorrhea and sore throat  Eyes: Negative for discharge and redness  Respiratory:        Still has some cough but this is least 80% of what it used to be prior to starting gabapentin  Cough nonproductive  No wheezing or shortness of breath   Cardiovascular: Negative for chest pain, palpitations and leg swelling  Gastrointestinal: Negative for abdominal distention, abdominal pain and nausea  He denies any symptoms of gastric reflux  Endocrine: Negative for polydipsia and polyphagia  Genitourinary: Negative for dysuria  Musculoskeletal: Negative for joint swelling and myalgias  Skin: Negative for rash  Neurological: Negative for light-headedness  Vitals:    01/13/20 0948   BP: 110/76   Pulse: 77   Resp: 12   Temp: 98 4 °F (36 9 °C)   SpO2: 97%           Physical Exam   Constitutional: He is oriented to person, place, and time  He appears well-developed and well-nourished  No distress  HENT:   Head: Normocephalic  Nose: Nose normal    Mouth/Throat: Oropharynx is clear and moist  No oropharyngeal exudate  Eyes: Pupils are equal, round, and reactive to light  Conjunctivae are normal    Neck: Neck supple  No JVD present  Cardiovascular: Normal rate, regular rhythm and normal heart sounds  Pulmonary/Chest: Effort normal    Lung sounds are clear to auscultation  No wheezes, crackles or rhonchi   Abdominal: Soft  He exhibits no distension  There is no tenderness  Musculoskeletal:   No edema of lower extremities   Neurological: He is alert and oriented to person, place, and time  Skin: Skin is warm and dry  Psychiatric: He has a normal mood and affect       Room air pulse oximetry at rest was 95% and after ambulating up and down a flight of stairs it improved to 97%

## 2020-01-31 ENCOUNTER — TELEPHONE (OUTPATIENT)
Dept: NEUROLOGY | Facility: CLINIC | Age: 50
End: 2020-01-31

## 2020-01-31 NOTE — TELEPHONE ENCOUNTER
Received a PA request for Aimovig  PA submitted through   [facility]  Awaiting clinical questions      Syed:  Alejandro Bejarano

## 2020-03-03 ENCOUNTER — OFFICE VISIT (OUTPATIENT)
Dept: FAMILY MEDICINE CLINIC | Facility: CLINIC | Age: 50
End: 2020-03-03
Payer: COMMERCIAL

## 2020-03-03 VITALS
HEIGHT: 69 IN | SYSTOLIC BLOOD PRESSURE: 114 MMHG | RESPIRATION RATE: 16 BRPM | DIASTOLIC BLOOD PRESSURE: 70 MMHG | WEIGHT: 187 LBS | TEMPERATURE: 99.7 F | BODY MASS INDEX: 27.7 KG/M2 | HEART RATE: 82 BPM

## 2020-03-03 DIAGNOSIS — R09.81 SINUS CONGESTION: Primary | ICD-10-CM

## 2020-03-03 PROCEDURE — 3008F BODY MASS INDEX DOCD: CPT | Performed by: NURSE PRACTITIONER

## 2020-03-03 PROCEDURE — 1036F TOBACCO NON-USER: CPT | Performed by: NURSE PRACTITIONER

## 2020-03-03 PROCEDURE — 99213 OFFICE O/P EST LOW 20 MIN: CPT | Performed by: NURSE PRACTITIONER

## 2020-03-03 RX ORDER — METHYLPREDNISOLONE 4 MG/1
TABLET ORAL
Qty: 21 TABLET | Refills: 0 | Status: SHIPPED | OUTPATIENT
Start: 2020-03-03 | End: 2020-12-01 | Stop reason: ALTCHOICE

## 2020-03-03 NOTE — PROGRESS NOTES
Assessment/Plan:    1  Sinus congestion  -     methylPREDNISolone 4 MG tablet therapy pack; Use as directed on package          BMI Counseling: Body mass index is 27 62 kg/m²  Discussed the patient's BMI with him  The BMI is above normal  Nutrition recommendations include reducing portion sizes, decreasing overall calorie intake, 3-5 servings of fruits/vegetables daily, reducing fast food intake, consuming healthier snacks, decreasing soda and/or juice intake, moderation in carbohydrate intake, increasing intake of lean protein, reducing intake of saturated fat and trans fat and reducing intake of cholesterol  Patient Instructions: Take medication with food  It is important that you take the entire course of antibiotics prescribed  May also take a probiotic of your choice to maintain healthy GI ari  Can take some probiotic and yogurt with the medication  Increase fluid intake, saline nasal rinses, and hot tea with honey and lemon  Cool air humidification can be helpful as well  May take Ibuprofen or Tylenol as needed for pain or fevers  Mucinex D for sinus congestion or Coricidin HBP if you have high blood pressure or a heart condition  Mucinex or Robitussin DM are effective for cough and chest congestion  Supportive care discussed and advised  Advised to RTO for any worsening and no improvement  Follow up for no improvement and worsening of conditions  Patient advised and educated when to see immediate medical care  Return if symptoms worsen or fail to improve  Future Appointments   Date Time Provider Edwardo Leyva   3/3/2020  9:30 AM TERESA Ferreira [facility]   3/23/2020  8:15 AM Sher Delgadillo PA-C [facility]   7/14/2020  8:20 AM DO ABEL Jaimes [place] [facility]   7/17/2020  8:45 AM Sher Delgadillo PA-C [facility] [place] [facility]           Subjective:      Patient ID: Cory Buckley is a 51 y o  male      Chief Complaint   Patient presents with    Sinus pressure     started over the weekend  ac/cma    Nasal Congestion    Sore Throat         Vitals:  /70   Pulse 82   Temp 99 7 °F (37 6 °C)   Resp 16   Ht 5' 9" (1 753 m)   Wt 84 8 kg (187 lb)   BMI 27 62 kg/m²     HPI  Patient stated that started with congestion, sore throat couple of days ago and progressed to sinus pressure  Denies fever, chills and sob  Using flonase PRN                The following portions of the patient's history were reviewed and updated as appropriate: allergies, current medications, past family history, past medical history, past social history, past surgical history and problem list       Review of Systems   Constitutional: Negative for chills, diaphoresis, fatigue, fever and unexpected weight change  HENT: Positive for congestion, ear pain and sore throat  Negative for dental problem, drooling, ear discharge, facial swelling, hearing loss, mouth sores, nosebleeds, postnasal drip, rhinorrhea, sinus pressure, sinus pain, sneezing, tinnitus, trouble swallowing and voice change  Respiratory: Negative for cough, chest tightness, shortness of breath and wheezing  Cardiovascular: Negative  Gastrointestinal: Negative for abdominal pain, constipation, diarrhea, nausea and vomiting  Musculoskeletal: Negative  Skin: Negative  Neurological: Negative for dizziness, weakness, light-headedness and headaches  Hematological: Negative  Objective:    Social History     Tobacco Use   Smoking Status Passive Smoke Exposure - Never Smoker   Smokeless Tobacco Never Used   Tobacco Comment    exposure to second hand smoke       Allergies: No Known Allergies      Current Outpatient Medications   Medication Sig Dispense Refill    atorvastatin (LIPITOR) 20 mg tablet Take 1 tablet (20 mg total) by mouth daily 90 tablet 0    dihydroergotamine (MIGRANAL) 4 MG/ML nasal spray 1 spray into each nostril as needed for migraine Use in one nostril as directed    No more than 4 sprays in one hour 6 mL 0    Erenumab-aooe 140 MG/ML SOAJ Inject 140 mg under the skin every 30 (thirty) days 1 pen 11    escitalopram (LEXAPRO) 20 mg tablet Take 1 tablet (20 mg total) by mouth daily  0    gabapentin (NEURONTIN) 300 mg capsule Take 1 capsule (300 mg total) by mouth 3 (three) times a day 90 capsule 5    ibuprofen (MOTRIN) 600 mg tablet as needed       lamoTRIgine (LAMICTAL) 150 MG tablet Take 1 tablet (150 mg total) by mouth 2 (two) times a day  0    verapamil (VERELAN PM) 120 MG 24 hr capsule Take 1 capsule (120 mg total) by mouth daily at bedtime 30 capsule 4    methylPREDNISolone 4 MG tablet therapy pack Use as directed on package 21 tablet 0     No current facility-administered medications for this visit  Physical Exam   Constitutional: He is oriented to person, place, and time  He appears well-developed and well-nourished  HENT:   Head: Normocephalic  Right Ear: External ear and ear canal normal  A middle ear effusion is present  Left Ear: Tympanic membrane, external ear and ear canal normal    Nose: Mucosal edema present  Right sinus exhibits no maxillary sinus tenderness and no frontal sinus tenderness  Left sinus exhibits no maxillary sinus tenderness and no frontal sinus tenderness  Mouth/Throat: Oropharynx is clear and moist and mucous membranes are normal    Neck: Neck supple  Cardiovascular: Normal rate, regular rhythm and normal heart sounds  Pulmonary/Chest: Effort normal and breath sounds normal    Abdominal: Normal appearance and bowel sounds are normal  There is no hepatosplenomegaly  There is no tenderness  There is no rebound  Musculoskeletal: Normal range of motion  Lymphadenopathy:        Right cervical: No superficial cervical and no posterior cervical adenopathy present  Left cervical: No superficial cervical and no posterior cervical adenopathy present  Neurological: He is alert and oriented to person, place, and time  Skin: Skin is warm and dry  Psychiatric: He has a normal mood and affect  His behavior is normal  Judgment and thought content normal    Vitals reviewed                    TERESA Lange

## 2020-03-23 ENCOUNTER — OFFICE VISIT (OUTPATIENT)
Dept: NEUROLOGY | Facility: CLINIC | Age: 50
End: 2020-03-23
Payer: COMMERCIAL

## 2020-03-23 VITALS
HEIGHT: 69 IN | WEIGHT: 180 LBS | SYSTOLIC BLOOD PRESSURE: 127 MMHG | HEART RATE: 87 BPM | BODY MASS INDEX: 26.66 KG/M2 | DIASTOLIC BLOOD PRESSURE: 65 MMHG

## 2020-03-23 DIAGNOSIS — G43.109 MIGRAINE WITH AURA AND WITHOUT STATUS MIGRAINOSUS, NOT INTRACTABLE: ICD-10-CM

## 2020-03-23 DIAGNOSIS — G43.709 CHRONIC MIGRAINE WITHOUT AURA WITHOUT STATUS MIGRAINOSUS, NOT INTRACTABLE: Primary | ICD-10-CM

## 2020-03-23 PROCEDURE — 99213 OFFICE O/P EST LOW 20 MIN: CPT | Performed by: PHYSICIAN ASSISTANT

## 2020-03-23 PROCEDURE — 3008F BODY MASS INDEX DOCD: CPT | Performed by: PHYSICIAN ASSISTANT

## 2020-03-23 PROCEDURE — 1036F TOBACCO NON-USER: CPT | Performed by: PHYSICIAN ASSISTANT

## 2020-03-23 NOTE — PROGRESS NOTES
Patient ID: Harshil Irby is a 53 y o  male  Assessment/Plan:     Diagnoses and all orders for this visit:    Chronic migraine without aura without status migrainosus, not intractable  -     Ubrogepant 50 MG TABS; 1-2 tabs at migraine onset, repeat in 2 hours if needed  Max 2 per day  Hold DHE  Migraine with aura and without status migrainosus, not intractable        Migraine headaches are stable to slightly worse since stopping Botox, however had sensitivity to Botox injections and was agreeable to continuing Aimovig only at this time  We discussed the possibility of migraine infusions: Vyepti  Patient was agreeable if appropriate for him  I told him that I would get more information from my colleagues and get back to him on this topic  He would be agreeable to restarting Botox if there are no other options  Continue for prevention:  1  140 mg Aimovig injection q30 days  2  300 mg gabapentin t i d , mainly used for chronic cough  3  150 mg Lamictal b i d   4  120 mg verapamil q h s  (24 hr capsule)    P r n  Migraine onset:  Continue DHE nasal spray, no side effects  Trial Ubrevly for abortive use, side effects reviewed  He was told not to take it together with DHE  The patient should not hesitate to call me prior to his follow up with any questions or concerns  The patient was instructed to urgently call 911 or present to the nearest emergency room with any new or worsening neurological deficits  This note was sent to Dr Samina Mendez to co-sign for Dr Olamide Oreilly since we are in [place] (not [place])  Subjective:    HPI    Mr Tha Alonso is an extremely pleasant 54 yo male here for neurological f/u for chronic migraines   He currently has disability      He stopped botox and last procedure was 7/22/19  Since then migraines are slightly worse, however he prefers not to continue Botox because of the tenderness of the needle sticks  Botox did help him significantly    He would continue if there is no other options  We discussed other options today  He has daily low-grade headaches  He has approximately 2 severe migraines per month for which he takes Migranal nasal spray and this helps significantly  He denies side effects to Migranal     Migraine headaches are associated with spots before the eyes, blurry vision, nausea, lightheadedness/ dizziness, light sensitivity and noise sensitivity  Migraines are characterized by pounding, throbbing, dull and nagging sensations  There typically 4/10 on average, but can be worse potentially      He continues Aimovig 140 mg subcu injection Q 30 days  Mary Palencia states he notices a huge difference in his migraines when he takes the 140 mg Aimovig verses the 70 mg   The headaches are reduced by >50% with 140mg q30 days      Aura- blurry vision in the corner of his eye (one or the other, or both)  See ophtho in [place] at least every year with normal dilated eye exam      Continues other medications:  150 mg Lamictal b i d , 120 mg 24 hr verapamil at bedtime, gabapentin 300 mg t i d  (Which is also used for cough, prescribed by pulmonology)  Mary Palencia denies side effects to these medications      Previously tried Topamax and weaned off      For rescue uses migranal NS      Family history of migraines in his mother, he has 6 siblings and none of them have migraines      He has a history of several concussions in the past, most occurring in childhood, and some occurring prior to the development of migraines and 6years old  The following portions of the patient's history were reviewed and updated as appropriate:   He  has a past medical history of Cubital tunnel syndrome, Depression (11/07/2005), Hyperlipidemia, Hypertension, Migraine, and Psychiatric disorder    He   Patient Active Problem List    Diagnosis Date Noted    Acute non-recurrent maxillary sinusitis 12/14/2019    Postnasal drip 07/29/2019    Migraine with aura and without status migrainosus, not intractable 03/01/2019    Astigmatism of right eye 05/04/2018    Chronic migraine without aura without status migrainosus, not intractable 03/26/2018    Chronic cough 03/09/2018    Hypercholesterolemia 09/04/2012    Benign familial tremor 09/04/2012    Bipolar affective disorder, currently depressed, moderate ([facility] [address] ) 09/04/2012    Persistent insomnia of non-organic origin 09/04/2012     He  has a past surgical history that includes SHOULDER ARTHROPLASTY; Knee arthrocentesis; Elbow Arthroplasty (Bilateral); TONSILLECTOMY; Sinus surgery; and Shoulder surgery (12/06/2018)  His family history includes Alzheimer's disease in his mother; Arthritis in his family; Asperger's syndrome in his son; Breast cancer in his family and sister; Depression in his mother; Heart disease in his family; Hypertension in his family; Migraines in his mother; No Known Problems in his father  He  reports that he is a non-smoker but has been exposed to tobacco smoke  He has been exposed to tobacco smoke for the past 48 00 years  He has never used smokeless tobacco  He reports that he does not drink alcohol or use drugs  Current Outpatient Medications   Medication Sig Dispense Refill    atorvastatin (LIPITOR) 20 mg tablet Take 1 tablet (20 mg total) by mouth daily 90 tablet 0    dihydroergotamine (MIGRANAL) 4 MG/ML nasal spray 1 spray into each nostril as needed for migraine Use in one nostril as directed    No more than 4 sprays in one hour 6 mL 0    Erenumab-aooe 140 MG/ML SOAJ Inject 140 mg under the skin every 30 (thirty) days 1 pen 11    escitalopram (LEXAPRO) 20 mg tablet Take 1 tablet (20 mg total) by mouth daily  0    gabapentin (NEURONTIN) 300 mg capsule Take 1 capsule (300 mg total) by mouth 3 (three) times a day 90 capsule 5    ibuprofen (MOTRIN) 600 mg tablet as needed       lamoTRIgine (LAMICTAL) 150 MG tablet Take 1 tablet (150 mg total) by mouth 2 (two) times a day  0    verapamil (VERELAN PM) 120 MG 24 hr capsule Take 1 capsule (120 mg total) by mouth daily at bedtime 30 capsule 4    methylPREDNISolone 4 MG tablet therapy pack Use as directed on package (Patient not taking: Reported on 3/23/2020) 21 tablet 0    Ubrogepant 50 MG TABS 1-2 tabs at migraine onset, repeat in 2 hours if needed  Max 2 per day  Hold DHE  10 tablet 0     No current facility-administered medications for this visit  He has No Known Allergies            Objective:    Blood pressure 127/65, pulse 87, height 5' 9" (1 753 m), weight 81 6 kg (180 lb)  Physical Exam    Neurological Exam  On neurologic exam, the patient is alert and oriented to time and place  Speech is fluent and articulate, and the patient follows commands appropriately  Judgment and affect appear normal  Pupils are equally round and reactive to light and extraocular muscles are intact without nystagmus  Face is symmetric, and tongue, uvula, and palate are midline  Facial sensation is normal and symmetric, in all 3 divisions of the trigeminal nerve  Hearing is intact  Motor examination reveals intact strength throughout  Normal gait is steady  ROS:    Review of Systems    See attached ROS per R-MA  The following portions of the patient's history were reviewed and updated as appropriate: allergies, current medications/ medication history, past family history, past medical history, past social history, past surgical history and problem list     Review of systems was reviewed and otherwise unremarkable from a neurological perspective

## 2020-03-23 NOTE — PROGRESS NOTES
Review of Systems   Constitutional: Negative  HENT: Positive for tinnitus (left ear )  Eyes: Negative  Respiratory: Negative  Cardiovascular: Negative  Gastrointestinal: Negative  Endocrine: Negative  Genitourinary: Negative  Musculoskeletal: Negative  Skin: Negative  Allergic/Immunologic: Negative  Neurological: Positive for headaches  Hematological: Negative  Psychiatric/Behavioral: Positive for sleep disturbance (difficulty staying asleep )  The patient is nervous/anxious

## 2020-04-08 ENCOUNTER — TELEPHONE (OUTPATIENT)
Dept: NEUROLOGY | Facility: CLINIC | Age: 50
End: 2020-04-08

## 2020-07-07 DIAGNOSIS — E78.00 HYPERCHOLESTEROLEMIA: ICD-10-CM

## 2020-07-07 RX ORDER — ATORVASTATIN CALCIUM 20 MG/1
20 TABLET, FILM COATED ORAL DAILY
Qty: 90 TABLET | Refills: 0 | Status: SHIPPED | OUTPATIENT
Start: 2020-07-07 | End: 2020-10-05

## 2020-07-07 NOTE — TELEPHONE ENCOUNTER
Requested medication(s) are due for refill today: Yes  Patient has already received a courtesy refill: No  Other reason request has been forwarded to provider:  Failed protocol         Pj Barnett MA

## 2020-07-13 ENCOUNTER — TELEPHONE (OUTPATIENT)
Dept: PULMONOLOGY | Facility: MEDICAL CENTER | Age: 50
End: 2020-07-13

## 2020-07-14 ENCOUNTER — HOSPITAL ENCOUNTER (OUTPATIENT)
Dept: RADIOLOGY | Facility: HOSPITAL | Age: 50
Discharge: HOME | End: 2020-07-14
Attending: INTERNAL MEDICINE
Payer: COMMERCIAL

## 2020-07-14 ENCOUNTER — OFFICE VISIT (OUTPATIENT)
Dept: PULMONOLOGY | Facility: MEDICAL CENTER | Age: 50
End: 2020-07-14
Payer: COMMERCIAL

## 2020-07-14 ENCOUNTER — TRANSCRIBE ORDERS (OUTPATIENT)
Dept: ADMINISTRATIVE | Facility: HOSPITAL | Age: 50
End: 2020-07-14

## 2020-07-14 VITALS
HEART RATE: 78 BPM | TEMPERATURE: 97.8 F | RESPIRATION RATE: 12 BRPM | WEIGHT: 181 LBS | DIASTOLIC BLOOD PRESSURE: 80 MMHG | SYSTOLIC BLOOD PRESSURE: 120 MMHG | OXYGEN SATURATION: 97 % | BODY MASS INDEX: 26.81 KG/M2 | HEIGHT: 69 IN

## 2020-07-14 DIAGNOSIS — R05.3 CHRONIC COUGH: ICD-10-CM

## 2020-07-14 DIAGNOSIS — R05.3 CHRONIC COUGH: Primary | ICD-10-CM

## 2020-07-14 PROCEDURE — 71046 X-RAY EXAM CHEST 2 VIEWS: CPT

## 2020-07-14 PROCEDURE — 99213 OFFICE O/P EST LOW 20 MIN: CPT | Performed by: INTERNAL MEDICINE

## 2020-07-14 PROCEDURE — 1036F TOBACCO NON-USER: CPT | Performed by: INTERNAL MEDICINE

## 2020-07-14 RX ORDER — BENZONATATE 100 MG/1
200 CAPSULE ORAL 2 TIMES DAILY PRN
Qty: 30 CAPSULE | Refills: 2 | Status: SHIPPED | OUTPATIENT
Start: 2020-07-14 | End: 2021-11-17

## 2020-07-14 NOTE — PROGRESS NOTES
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  He has had significant improvement with gabapentin which he is taking 300 mg 3 times a day  Will continue this medication  He is not complaining of any postnasal drainage or GERD symptoms  No response to prednisone in the past and he denies any gastric reflux symptoms  I did order a chest x-ray because of his persistent cough and also do some blood work including CV are PE, JOE CMP and CBC with diff  He has had allergy testing the past   He is not have any wheezing or shortness of breath  Last chest x-ray done was done in July 2019 and showed no sign of any interstitial lung disease  There is minimal elevation left diaphragm with minimal small area of linear atelectasis left lower lobe  CT scan of his chest done August 2018 showed small hiatal hernia but no evidence of any interstitial lung disease  I did order JOE C reactive protein CBC and CMP  Also I did order follow-up chest x-ray measures no developing interstitial lung disease or new abnormalities that would account for his cough  Continue gabapentin 300 mg t i d  As this is helping control his cough which he has had for several years  I did renew prescription for benzonatate 100 mg and he can take 1-2 capsules twice a day as needed for cough as well  Relevant Medications    benzonatate (TESSALON PERLES) 100 mg capsule    Other Relevant Orders    XR chest pa & lateral (Completed)    C-reactive protein    CBC and differential    Comprehensive metabolic panel    JOE Screen w/ Reflex to Titer/Pattern            Follow-up      HPI     Destinee Preciado presents today for follow-up of his chronic cough  He has had this chronic cough for several years    I did prescribe him gabapentin in the past and he did have a significant improvement in his cough of up to 80%  States the gabapentin has been a big relief from  He did have upper endoscopy and colonoscopy done earlier this year by his gastroenterologist, Dr Seferino Alvarez in [place]  He states the results were unremarkable he had no sign of any significant gastric reflux  He has some seasonal allergies but they are mild  He states he is allergic to cats and had allergy testing in the past   He denies any cough when he sleeps at night  Not having any shortness of breath or wheezing  He has tried prednisone in the past no improvement in his cough  No history of any asthma  Last chest x-ray done in July 2019 showed mild elevation left diaphragm minimal left lower lobe atelectasis  No evidence of any interstitial lung disease  He did have a CT of his chest done in August 2017 which showed small hiatal hernia but no evidence of any type of interstitial lung disease or pneumonitis  Denies any joint pains  He is not have any shortness of breath with activity  Clinical diagnosis by me is cough hypersensitivity syndrome or also would is referred to a sensory neuropathic cough  He does have history of hypertension  Past Medical History:   Diagnosis Date    Cubital tunnel syndrome     last assessed 1/6/14    Depression 11/07/2005    Hyperlipidemia     Hypertension     Migraine     Psychiatric disorder        Past Surgical History:   Procedure Laterality Date    ELBOW ARTHROPLASTY Bilateral     right elbow reconstruction surgery and transposition surgery of the left elbow    KNEE ARTHROCENTESIS      SHOULDER ARTHROPLASTY      SHOULDER SURGERY  12/06/2018    AC Joint- Left    SINUS SURGERY      with bone spur removed in uvulectomy done  Had [facility] at the time, Dr Pablo Miramontes E[facility]    TONSILLECTOMY      tonsils removed and also removal of uvula, then a few years ago by Dr Pablo Miramontes  At that time he was having frequent sinus and throat infections, last assessed 12/19/16         Current Outpatient Medications:     atorvastatin (LIPITOR) 20 mg tablet, Take 1 tablet (20 mg total) by mouth daily, Disp: 90 tablet, Rfl: 0    dihydroergotamine (MIGRANAL) 4 MG/ML nasal spray, 1 spray into each nostril as needed for migraine Use in one nostril as directed  No more than 4 sprays in one hour, Disp: 6 mL, Rfl: 0    Erenumab-aooe 140 MG/ML SOAJ, Inject 140 mg under the skin every 30 (thirty) days, Disp: 1 pen, Rfl: 11    escitalopram (LEXAPRO) 20 mg tablet, Take 1 tablet (20 mg total) by mouth daily, Disp: , Rfl: 0    gabapentin (NEURONTIN) 300 mg capsule, Take 1 capsule (300 mg total) by mouth 3 (three) times a day, Disp: 90 capsule, Rfl: 5    ibuprofen (MOTRIN) 600 mg tablet, as needed , Disp: , Rfl:     lamoTRIgine (LAMICTAL) 150 MG tablet, Take 1 tablet (150 mg total) by mouth 2 (two) times a day, Disp: , Rfl: 0    verapamil (VERELAN PM) 120 MG 24 hr capsule, Take 1 capsule (120 mg total) by mouth daily at bedtime, Disp: 30 capsule, Rfl: 4    benzonatate (TESSALON PERLES) 100 mg capsule, Take 2 capsules (200 mg total) by mouth 2 (two) times a day as needed for cough, Disp: 30 capsule, Rfl: 2    methylPREDNISolone 4 MG tablet therapy pack, Use as directed on package (Patient not taking: Reported on 3/23/2020), Disp: 21 tablet, Rfl: 0    Ubrogepant 50 MG TABS, 1-2 tabs at migraine onset, repeat in 2 hours if needed  Max 2 per day   Hold DHE , Disp: 10 tablet, Rfl: 0    No Known Allergies    Social History     Tobacco Use    Smoking status: Passive Smoke Exposure - Never Smoker    Smokeless tobacco: Never Used    Tobacco comment: exposure to second hand smoke   Substance Use Topics    Alcohol use: No         Family History   Problem Relation Age of Onset    Migraines Mother     Depression Mother     Alzheimer's disease Mother     No Known Problems Father     Arthritis Family     Breast cancer Family     Heart disease Family cardiac disorder    Hypertension Family     Breast cancer Sister     Asperger's syndrome Son        Review of Systems   Constitutional: Negative for activity change, appetite change and fatigue  HENT: Negative for congestion and postnasal drip  Eyes: Negative for pain and redness  Respiratory: Positive for cough  Negative for shortness of breath and wheezing  Cardiovascular: Negative for chest pain and leg swelling  Gastrointestinal: Negative for abdominal distention and abdominal pain  Endocrine: Negative for polydipsia and polyphagia  Genitourinary: Negative for hematuria  Musculoskeletal: Negative for joint swelling  Neurological: Negative for light-headedness  Psychiatric/Behavioral: Negative for confusion  Vitals:    07/14/20 0822   BP: 120/80   Pulse: 78   Resp: 12   Temp: 97 8 °F (36 6 °C)   SpO2: 97%           Physical Exam   Constitutional: He is oriented to person, place, and time  He appears well-developed and well-nourished  No distress  HENT:   Head: Normocephalic  Nose: Nose normal    Mouth/Throat: Oropharynx is clear and moist  No oropharyngeal exudate  Eyes: Pupils are equal, round, and reactive to light  Conjunctivae are normal    Neck: Neck supple  No JVD present  No tracheal deviation present  No thyromegaly present  Cardiovascular: Normal rate, regular rhythm and normal heart sounds  Pulmonary/Chest: Effort normal    Lung sounds are clear without any wheezing, crackles or rhonchi   Abdominal: Soft  He exhibits no distension  There is no tenderness  Musculoskeletal:   No edema, cyanosis or clubbing   Neurological: He is alert and oriented to person, place, and time  Skin: Skin is warm and dry  Psychiatric: He has a normal mood and affect

## 2020-07-14 NOTE — ASSESSMENT & PLAN NOTE
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  He has had significant improvement with gabapentin which he is taking 300 mg 3 times a day  Will continue this medication  He is not complaining of any postnasal drainage or GERD symptoms  No response to prednisone in the past and he denies any gastric reflux symptoms  I did order a chest x-ray because of his persistent cough and also do some blood work including CV are PE, JOE CMP and CBC with diff  He has had allergy testing the past   He is not have any wheezing or shortness of breath  Last chest x-ray done was done in July 2019 and showed no sign of any interstitial lung disease  There is minimal elevation left diaphragm with minimal small area of linear atelectasis left lower lobe  CT scan of his chest done August 2018 showed small hiatal hernia but no evidence of any interstitial lung disease  I did order JOE C reactive protein CBC and CMP  Also I did order follow-up chest x-ray measures no developing interstitial lung disease or new abnormalities that would account for his cough  Continue gabapentin 300 mg t i d  As this is helping control his cough which he has had for several years  I did renew prescription for benzonatate 100 mg and he can take 1-2 capsules twice a day as needed for cough as well

## 2020-07-17 ENCOUNTER — OFFICE VISIT (OUTPATIENT)
Dept: NEUROLOGY | Facility: CLINIC | Age: 50
End: 2020-07-17
Payer: COMMERCIAL

## 2020-07-17 ENCOUNTER — TELEPHONE (OUTPATIENT)
Dept: NEUROLOGY | Facility: CLINIC | Age: 50
End: 2020-07-17

## 2020-07-17 VITALS
SYSTOLIC BLOOD PRESSURE: 114 MMHG | BODY MASS INDEX: 26.81 KG/M2 | WEIGHT: 181 LBS | TEMPERATURE: 98.5 F | HEIGHT: 69 IN | DIASTOLIC BLOOD PRESSURE: 82 MMHG

## 2020-07-17 DIAGNOSIS — G43.709 CHRONIC MIGRAINE WITHOUT AURA WITHOUT STATUS MIGRAINOSUS, NOT INTRACTABLE: Primary | ICD-10-CM

## 2020-07-17 PROCEDURE — 1036F TOBACCO NON-USER: CPT | Performed by: PHYSICIAN ASSISTANT

## 2020-07-17 PROCEDURE — 99213 OFFICE O/P EST LOW 20 MIN: CPT | Performed by: PHYSICIAN ASSISTANT

## 2020-07-17 NOTE — TELEPHONE ENCOUNTER
Called [facility] and spoke to Gill - she informed me that pt has [employer] [place]  I enquired if prior Marlise Hudson is needed for Botox 200 Units, Proc: , [id number], Dx: [id number]  She informed me that prior Marlise Hudson is needed  Prior auth inicial request can be submitted via fax# [phone] and there is a 30 days turn around time  Or via phone# [phone] to obtain pending ref# and fax with clinicals to fax# [phone] with only pending ref# on cover sheet  Per Gill Botox is covered as buy and bill only    I initiated prior auth via phone and was provided with pend ref# [id number] to fax with clinicals to [phone]  She informed me that there is 15 days turn around

## 2020-07-17 NOTE — PROGRESS NOTES
Patient ID: Pili Augustine is a 53 y o  male  Assessment/Plan:     Diagnoses and all orders for this visit:    Chronic migraine without aura without status migrainosus, not intractable  -     Chemodenervation; Future  -     Ubrogepant 50 MG TABS; 1-2 tabs at migraine onset, repeat in 2 hours if needed  Max 2 per day  Hold DHE  Migraines are worse since last seen  He has benefited from botox injections in the past so will restart these again  Se reviewed  Continue for prevention:  1  140 mg Aimovig injection q30 days  2  300 mg gabapentin t i d , mainly used for chronic cough  3  150 mg Lamictal b i d   4  120 mg verapamil q h s  (24 hr capsule)    PRN migraine- continue DHE nasal spray when severe  Ubrelvy trial, hold DHE with Ubrelvy  S/e of all med above reviewed  The patient should not hesitate to call me prior to his follow up with any questions or concerns  The patient was instructed to urgently call 911 or present to the nearest emergency room with any new or worsening neurological deficits  Subjective:    HPI    He would like to restart botox since migraines are worse since stopping it  Since starting botox, the patient reports greater than 7 days of migraine relief from baseline, correlated with headache diary, decreased abortive medication use and decreased ER visits  Also reports worsening migraine headaches/ more frequent in the summer time due to the hot weather  States 1-2 times per month he takes DHE nasal spray, which sometimes works to lessen the migraine  He lays in a quiet/ dark room for several hours, and then usually the next day the migraine subsides  Denies any new sxs  Denies any focal deficits  Denies any changes to the characteristics of the migraines  See last neurology note on 3/23/2020      The following portions of the patient's history were reviewed and updated as appropriate:   He  has a past medical history of Cubital tunnel syndrome, Depression (11/07/2005), Hyperlipidemia, Hypertension, Migraine, and Psychiatric disorder  He   Patient Active Problem List    Diagnosis Date Noted    Acute non-recurrent maxillary sinusitis 12/14/2019    Postnasal drip 07/29/2019    Migraine with aura and without status migrainosus, not intractable 03/01/2019    Astigmatism of right eye 05/04/2018    Chronic migraine without aura without status migrainosus, not intractable 03/26/2018    Chronic cough 03/09/2018    Hypercholesterolemia 09/04/2012    Benign familial tremor 09/04/2012    Bipolar affective disorder, currently depressed, moderate ([facility] [address] ) 09/04/2012    Persistent insomnia of non-organic origin 09/04/2012     He  has a past surgical history that includes SHOULDER ARTHROPLASTY; Knee arthrocentesis; Elbow Arthroplasty (Bilateral); TONSILLECTOMY; Sinus surgery; and Shoulder surgery (12/06/2018)  His family history includes Alzheimer's disease in his mother; Arthritis in his family; Asperger's syndrome in his son; Breast cancer in his family and sister; Depression in his mother; Heart disease in his family; Hypertension in his family; Migraines in his mother; No Known Problems in his father  He  reports that he is a non-smoker but has been exposed to tobacco smoke  He has been exposed to tobacco smoke for the past 48 00 years  He has never used smokeless tobacco  He reports that he does not drink alcohol or use drugs  Current Outpatient Medications   Medication Sig Dispense Refill    atorvastatin (LIPITOR) 20 mg tablet Take 1 tablet (20 mg total) by mouth daily 90 tablet 0    benzonatate (TESSALON PERLES) 100 mg capsule Take 2 capsules (200 mg total) by mouth 2 (two) times a day as needed for cough 30 capsule 2    dihydroergotamine (MIGRANAL) 4 MG/ML nasal spray 1 spray into each nostril as needed for migraine Use in one nostril as directed    No more than 4 sprays in one hour 6 mL 0    Erenumab-aooe 140 MG/ML SOAJ Inject 140 mg under the skin every 30 (thirty) days 1 pen 11    escitalopram (LEXAPRO) 20 mg tablet Take 1 tablet (20 mg total) by mouth daily  0    gabapentin (NEURONTIN) 300 mg capsule Take 1 capsule (300 mg total) by mouth 3 (three) times a day 90 capsule 5    lamoTRIgine (LAMICTAL) 150 MG tablet Take 1 tablet (150 mg total) by mouth 2 (two) times a day  0    Ubrogepant 50 MG TABS 1-2 tabs at migraine onset, repeat in 2 hours if needed  Max 2 per day  Hold DHE  10 tablet 0    verapamil (VERELAN PM) 120 MG 24 hr capsule Take 1 capsule (120 mg total) by mouth daily at bedtime 30 capsule 4    ibuprofen (MOTRIN) 600 mg tablet as needed       methylPREDNISolone 4 MG tablet therapy pack Use as directed on package (Patient not taking: Reported on 3/23/2020) 21 tablet 0     No current facility-administered medications for this visit  He has No Known Allergies            Objective:    Blood pressure 114/82, temperature 98 5 °F (36 9 °C), height 5' 9" (1 753 m), weight 82 1 kg (181 lb)  Body mass index is 26 73 kg/m²  Physical Exam    Neurological Exam  On neurologic exam, the patient is alert and oriented to time and place  Speech is fluent and articulate, and the patient follows commands appropriately  Judgment and affect appear normal  Pupils are equally round and reactive to light and extraocular muscles are intact without nystagmus  Face is symmetric, and tongue, uvula, and palate are midline  Facial sensation is normal and symmetric, in all 3 divisions of the trigeminal nerve  Hearing is intact  Motor examination reveals intact strength throughout  Normal gait is steady  ROS:    Review of Systems   Constitutional: Negative  HENT: Negative  Eyes: Negative  Respiratory: Negative  Cardiovascular: Negative  Gastrointestinal: Negative  Endocrine: Negative  Genitourinary: Negative  Skin: Negative  Allergic/Immunologic: Negative  Neurological: Positive for headaches  Hematological: Negative  Psychiatric/Behavioral: The patient is nervous/anxious  The following portions of the patient's history were reviewed and updated as appropriate: allergies, current medications/ medication history, past family history, past medical history, past social history, past surgical history and problem list     Review of systems was reviewed and otherwise unremarkable from a neurological perspective

## 2020-07-25 DIAGNOSIS — R05.9 COUGH: ICD-10-CM

## 2020-07-28 ENCOUNTER — DOCUMENTATION (OUTPATIENT)
Dept: NEUROLOGY | Facility: CLINIC | Age: 50
End: 2020-07-28

## 2020-07-28 NOTE — PROGRESS NOTES
General 07/27/2020 12:17 PM Oly Marcelino MA CARE COORDINATION -   Note    BOTOX 200 UNITS - REF# [id number], AV- 07/17/2020 TILL 12/31/2020    STOCK

## 2020-07-29 DIAGNOSIS — R05.9 COUGH: ICD-10-CM

## 2020-07-29 RX ORDER — GABAPENTIN 300 MG/1
300 CAPSULE ORAL 3 TIMES DAILY
Qty: 90 CAPSULE | Refills: 5 | Status: SHIPPED | OUTPATIENT
Start: 2020-07-29 | End: 2020-10-02 | Stop reason: SDUPTHER

## 2020-08-04 RX ORDER — GABAPENTIN 300 MG/1
CAPSULE ORAL
Qty: 90 CAPSULE | Refills: 5 | Status: SHIPPED | OUTPATIENT
Start: 2020-08-04 | End: 2021-02-01

## 2020-08-07 LAB
ALBUMIN SERPL-MCNC: 4.8 G/DL (ref 4–5)
ALBUMIN/GLOB SERPL: 2.4 {RATIO} (ref 1.2–2.2)
ALP SERPL-CCNC: 89 IU/L (ref 39–117)
ALT SERPL-CCNC: 34 IU/L (ref 0–44)
ANA TITR SER IF: NEGATIVE {TITER}
AST SERPL-CCNC: 23 IU/L (ref 0–40)
BASOPHILS # BLD AUTO: 0.1 X10E3/UL (ref 0–0.2)
BASOPHILS NFR BLD AUTO: 1 %
BILIRUB SERPL-MCNC: 0.4 MG/DL (ref 0–1.2)
BUN SERPL-MCNC: 13 MG/DL (ref 6–24)
BUN/CREAT SERPL: 13 (ref 9–20)
CALCIUM SERPL-MCNC: 9.5 MG/DL (ref 8.7–10.2)
CHLORIDE SERPL-SCNC: 100 MMOL/L (ref 96–106)
CO2 SERPL-SCNC: 23 MMOL/L (ref 20–29)
CREAT SERPL-MCNC: 1.03 MG/DL (ref 0.76–1.27)
CRP SERPL-MCNC: <1 MG/L (ref 0–10)
EOSINOPHIL # BLD AUTO: 0 X10E3/UL (ref 0–0.4)
EOSINOPHIL NFR BLD AUTO: 1 %
ERYTHROCYTE [DISTWIDTH] IN BLOOD BY AUTOMATED COUNT: 12.8 % (ref 11.6–15.4)
GLOBULIN SER-MCNC: 2 G/DL (ref 1.5–4.5)
GLUCOSE SERPL-MCNC: 93 MG/DL (ref 65–99)
HCT VFR BLD AUTO: 44.7 % (ref 37.5–51)
HGB BLD-MCNC: 15 G/DL (ref 13–17.7)
IMM GRANULOCYTES # BLD: 0 X10E3/UL (ref 0–0.1)
IMM GRANULOCYTES NFR BLD: 1 %
LYMPHOCYTES # BLD AUTO: 2.1 X10E3/UL (ref 0.7–3.1)
LYMPHOCYTES NFR BLD AUTO: 31 %
MCH RBC QN AUTO: 27.4 PG (ref 26.6–33)
MCHC RBC AUTO-ENTMCNC: 33.6 G/DL (ref 31.5–35.7)
MCV RBC AUTO: 82 FL (ref 79–97)
MONOCYTES # BLD AUTO: 0.6 X10E3/UL (ref 0.1–0.9)
MONOCYTES NFR BLD AUTO: 9 %
NEUTROPHILS # BLD AUTO: 3.9 X10E3/UL (ref 1.4–7)
NEUTROPHILS NFR BLD AUTO: 57 %
PLATELET # BLD AUTO: 305 X10E3/UL (ref 150–450)
POTASSIUM SERPL-SCNC: 4.3 MMOL/L (ref 3.5–5.2)
PROT SERPL-MCNC: 6.8 G/DL (ref 6–8.5)
RBC # BLD AUTO: 5.48 X10E6/UL (ref 4.14–5.8)
SL AMB EGFR AFRICAN AMERICAN: 98 ML/MIN/1.73
SL AMB EGFR NON AFRICAN AMERICAN: 85 ML/MIN/1.73
SODIUM SERPL-SCNC: 137 MMOL/L (ref 134–144)
WBC # BLD AUTO: 6.6 X10E3/UL (ref 3.4–10.8)

## 2020-08-21 ENCOUNTER — PROCEDURE VISIT (OUTPATIENT)
Dept: NEUROLOGY | Facility: CLINIC | Age: 50
End: 2020-08-21
Payer: COMMERCIAL

## 2020-08-21 VITALS
TEMPERATURE: 98 F | WEIGHT: 181 LBS | HEART RATE: 71 BPM | DIASTOLIC BLOOD PRESSURE: 81 MMHG | SYSTOLIC BLOOD PRESSURE: 121 MMHG | BODY MASS INDEX: 26.81 KG/M2 | HEIGHT: 69 IN

## 2020-08-21 DIAGNOSIS — G43.709 CHRONIC MIGRAINE WITHOUT AURA WITHOUT STATUS MIGRAINOSUS, NOT INTRACTABLE: Primary | ICD-10-CM

## 2020-08-21 PROCEDURE — 3008F BODY MASS INDEX DOCD: CPT | Performed by: PHYSICIAN ASSISTANT

## 2020-08-21 PROCEDURE — 64615 CHEMODENERV MUSC MIGRAINE: CPT | Performed by: PHYSICIAN ASSISTANT

## 2020-08-21 NOTE — PROGRESS NOTES
Chemodenervation    Date/Time: 8/21/2020 1:00 PM  Performed by: Pamela Matute PA-C  Authorized by: Pamela Matute PA-C     Pre-procedure details:     Prepped With: Alcohol    Procedure details:     Position:  Upright  Botox:     Botox Type:  Type A    Brand:  Botox    mL's of Botulinum Toxin:  155    Final Concentration per CC:  100 units    Needle Gauge:  30 G 2 5 inch  Procedures:     Botox Procedures: chronic headache      Indications: migraines    Injection Location:     Head / Face:  L , R , L frontalis, R frontalis, R inferior cervical paraspinal, L inferior cervical paraspinal, L medial occipitalis, R medial occipitalis, L lateral occipitalis, R lateral occipitalis, procerus, L temporalis, R temporalis, R superior trapezius and L superior trapezius    L  injection amount:  5 unit(s)    R  injection amount:  5 unit(s)    L lateral frontalis:  5 unit(s)    R lateral frontalis:  5 unit(s)    L medial frontalis:  5 unit(s)    R medial frontalis:  5 unit(s)    L temporalis injection amount:  20 unit(s)    R temporalis injection amount:  20 unit(s)    Procerus injection amount:  5 unit(s)    L lateral occipitalis injection amount:  5 unit(s)    R lateral occipitalis injection amount:  5 unit(s)    L medial occipitalis injection amount:  10 unit(s)    R medial occipitalis injection amount:  10 unit(s)    L inferior cervical paraspinal injection amount:  10 unit(s)    R inferior cervical paraspinal injection amount:  10 unit(s)    L superior trapezius injection amount:  15 unit(s)    R superior trapezius injection amount:  15 unit(s)  Total Units:     Total units used:  155    Total units discarded:  45  Post-procedure details:     Chemodenervation:  Chronic migraine    Facial Nerve Location[de-identified]  Bilateral facial nerve    Patient tolerance of procedure:   Tolerated well, no immediate complications      Blood pressure 121/81, pulse 71, temperature 98 °F (36 7 °C), height 5' 9" (1 753 m), weight 82 1 kg (181 lb)

## 2020-08-25 ENCOUNTER — TELEPHONE (OUTPATIENT)
Dept: NEUROLOGY | Facility: CLINIC | Age: 50
End: 2020-08-25

## 2020-08-25 NOTE — TELEPHONE ENCOUNTER
The form has been completed, and I will have Dr Kira Arevalo sign as [address] is on vacation this week

## 2020-09-17 ENCOUNTER — DOCUMENTATION (OUTPATIENT)
Dept: NEUROLOGY | Facility: CLINIC | Age: 50
End: 2020-09-17

## 2020-09-17 NOTE — PROGRESS NOTES
General  09/16/2020  4:26 PM  Lizz Jacinto MA  CARE COORDINATION  -    Note     BOTOX 200 UNITS - REF# [id number], AV- 07/17/2020 TILL 12/31/2020    STOCK

## 2020-09-24 ENCOUNTER — OFFICE VISIT (OUTPATIENT)
Dept: FAMILY MEDICINE CLINIC | Facility: CLINIC | Age: 50
End: 2020-09-24
Payer: COMMERCIAL

## 2020-09-24 ENCOUNTER — TELEPHONE (OUTPATIENT)
Dept: FAMILY MEDICINE CLINIC | Facility: CLINIC | Age: 50
End: 2020-09-24

## 2020-09-24 VITALS
HEART RATE: 76 BPM | TEMPERATURE: 97.2 F | SYSTOLIC BLOOD PRESSURE: 132 MMHG | RESPIRATION RATE: 16 BRPM | WEIGHT: 187 LBS | BODY MASS INDEX: 27.7 KG/M2 | DIASTOLIC BLOOD PRESSURE: 84 MMHG | HEIGHT: 69 IN

## 2020-09-24 DIAGNOSIS — J01.00 ACUTE NON-RECURRENT MAXILLARY SINUSITIS: Primary | ICD-10-CM

## 2020-09-24 DIAGNOSIS — J30.2 SEASONAL ALLERGIES: ICD-10-CM

## 2020-09-24 PROCEDURE — 99213 OFFICE O/P EST LOW 20 MIN: CPT | Performed by: FAMILY MEDICINE

## 2020-09-24 RX ORDER — OLOPATADINE HYDROCHLORIDE 1 MG/ML
1 SOLUTION/ DROPS OPHTHALMIC 2 TIMES DAILY
Qty: 5 ML | Refills: 0 | Status: SHIPPED | OUTPATIENT
Start: 2020-09-24 | End: 2021-11-17

## 2020-09-24 RX ORDER — SULFAMETHOXAZOLE AND TRIMETHOPRIM 800; 160 MG/1; MG/1
1 TABLET ORAL EVERY 12 HOURS SCHEDULED
Qty: 20 TABLET | Refills: 0 | Status: SHIPPED | OUTPATIENT
Start: 2020-09-24 | End: 2020-10-04

## 2020-09-24 NOTE — PATIENT INSTRUCTIONS

## 2020-09-24 NOTE — TELEPHONE ENCOUNTER
I scheduled patient for virtual visit today with Dr Elijah Lovelace   Please see if patient should be seen in office or kept as virtual    Call patient if appt changes to in office

## 2020-09-24 NOTE — PROGRESS NOTES
Assessment/Plan:    1  Acute non-recurrent maxillary sinusitis  -     sulfamethoxazole-trimethoprim (BACTRIM DS) 800-160 mg per tablet; Take 1 tablet by mouth every 12 (twelve) hours for 10 days    2  BMI 27 0-27 9,adult    3  Seasonal allergies  -     olopatadine (PATANOL) 0 1 % ophthalmic solution; Administer 1 drop to both eyes 2 (two) times a day for 7 days      BMI Counseling: Body mass index is 27 62 kg/m²  The BMI is above normal  Nutrition recommendations include decreasing portion sizes  Exercise recommendations include moderate physical activity 150 minutes/week  No pharmacotherapy was ordered  Patient Instructions     Weight Management   AMBULATORY CARE:   Why it is important to manage your weight:  Being overweight increases your risk of health conditions such as heart disease, high blood pressure, type 2 diabetes, and certain types of cancer  It can also increase your risk for osteoarthritis, sleep apnea, and other respiratory problems  Aim for a slow, steady weight loss  Even a small amount of weight loss can lower your risk of health problems  How to lose weight safely:  A safe and healthy way to lose weight is to eat fewer calories and get regular exercise  You can lose up about 1 pound a week by decreasing the number of calories you eat by 500 calories each day  You can decrease calories by eating smaller portion sizes or by cutting out high-calorie foods  Read labels to find out how many calories are in the foods you eat  You can also burn calories with exercise such as walking, swimming, or biking  You will be more likely to keep weight off if you make these changes part of your lifestyle  Healthy meal plan for weight management:  A healthy meal plan includes a variety of foods, contains fewer calories, and helps you stay healthy  A healthy meal plan includes the following:  · Eat whole-grain foods more often  A healthy meal plan should contain fiber   Fiber is the part of grains, fruits, and vegetables that is not broken down by your body  Whole-grain foods are healthy and provide extra fiber in your diet  Some examples of whole-grain foods are whole-wheat breads and pastas, oatmeal, brown rice, and bulgur  · Eat a variety of vegetables every day  Include dark, leafy greens such as spinach, kale, nayeli greens, and mustard greens  Eat yellow and orange vegetables such as carrots, sweet potatoes, and winter squash  · Eat a variety of fruits every day  Choose fresh or canned fruit (canned in its own juice or light syrup) instead of juice  Fruit juice has very little or no fiber  · Eat low-fat dairy foods  Drink fat-free (skim) milk or 1% milk  Eat fat-free yogurt and low-fat cottage cheese  Try low-fat cheeses such as mozzarella and other reduced-fat cheeses  · Choose meat and other protein foods that are low in fat  Choose beans or other legumes such as split peas or lentils  Choose fish, skinless poultry (chicken or turkey), or lean cuts of red meat (beef or pork)  Before you cook meat or poultry, cut off any visible fat  · Use less fat and oil  Try baking foods instead of frying them  Add less fat, such as margarine, sour cream, regular salad dressing and mayonnaise to foods  Eat fewer high-fat foods  Some examples of high-fat foods include french fries, doughnuts, ice cream, and cakes  · Eat fewer sweets  Limit foods and drinks that are high in sugar  This includes candy, cookies, regular soda, and sweetened drinks  Ways to decrease calories:   · Eat smaller portions  ¨ Use a small plate with smaller servings  ¨ Do not eat second helpings  ¨ When you eat at a restaurant, ask for a box and place half of your meal in the box before you eat  ¨ Share an entrée with someone else  · Replace high-calorie snacks with healthy, low-calorie snacks       ¨ Choose fresh fruit, vegetables, fat-free rice cakes, or air-popped popcorn instead of potato chips, nuts, or chocolate  ¨ Choose water or calorie-free drinks instead of soda or sweetened drinks  · Eat regular meals  Skipping meals can lead to overeating later in the day  Eat a healthy snack in place of a meal if you do not have time to eat a regular meal      · Do not shop for groceries when you are hungry  You may be more likely to make unhealthy food choices  Take a grocery list of healthy foods and shop after you have eaten  Exercise:  Exercise at least 30 minutes per day on most days of the week  Some examples of exercise include walking, biking, dancing, and swimming  You can also fit in more physical activity by taking the stairs instead of the elevator or parking farther away from stores  Ask your healthcare provider about the best exercise plan for you  Other things to consider as you try to lose weight:   · Be aware of situations that may give you the urge to overeat, such as eating while watching television  Find ways to avoid these situations  For example, read a book, go for a walk, or do crafts  · Meet with a weight loss support group or friends who are also trying to lose weight  This may help you stay motivated to continue working on your weight loss goals  © 2017 [facility] Information is for End User's use only and may not be sold, redistributed or otherwise used for commercial purposes  All illustrations and images included in CareNotes® are the copyrighted property of [employer]  or [employer]ne[employer] Mccormick  The above information is an  only  It is not intended as medical advice for individual conditions or treatments  Talk to your doctor, nurse or pharmacist before following any medical regimen to see if it is safe and effective for you  No follow-ups on file  Subjective:      Patient ID: Sherine Carroll is a 53 y o  male      Chief Complaint   Patient presents with    Earache    sinus pressure     [facility]       Pt is here for a same day appt  Pt states he has forehead pressure and rt sided ear pain  NO fever  No chills  Pt has been sick for a week      The following portions of the patient's history were reviewed and updated as appropriate: allergies, current medications, past family history, past medical history, past social history, past surgical history and problem list     Review of Systems   Constitutional: Negative for activity change, appetite change, chills, diaphoresis, fatigue, fever and unexpected weight change  HENT: Positive for congestion, ear pain, postnasal drip, sinus pressure and sinus pain  Negative for dental problem, mouth sores, sore throat and trouble swallowing  Eyes: Negative for photophobia, discharge and itching  Respiratory: Negative for apnea, chest tightness and shortness of breath  Cardiovascular: Negative for chest pain, palpitations and leg swelling  Gastrointestinal: Negative for abdominal distention, abdominal pain, blood in stool, nausea and vomiting  Endocrine: Negative for cold intolerance, heat intolerance, polydipsia, polyphagia and polyuria  Genitourinary: Negative for difficulty urinating  Musculoskeletal: Negative for arthralgias  Skin: Negative for color change and wound  Neurological: Negative for dizziness, syncope, speech difficulty and headaches  Hematological: Negative for adenopathy  Psychiatric/Behavioral: Negative for agitation and behavioral problems  Current Outpatient Medications   Medication Sig Dispense Refill    atorvastatin (LIPITOR) 20 mg tablet Take 1 tablet (20 mg total) by mouth daily 90 tablet 0    benzonatate (TESSALON PERLES) 100 mg capsule Take 2 capsules (200 mg total) by mouth 2 (two) times a day as needed for cough 30 capsule 2    dihydroergotamine (MIGRANAL) 4 MG/ML nasal spray 1 spray into each nostril as needed for migraine Use in one nostril as directed    No more than 4 sprays in one hour 6 mL 0    Erenumab-aooe 140 MG/ML SOAJ Inject 140 mg under the skin every 30 (thirty) days 1 pen 11    escitalopram (LEXAPRO) 20 mg tablet Take 1 tablet (20 mg total) by mouth daily  0    gabapentin (NEURONTIN) 300 mg capsule TAKE ONE CAPSULE THREE TIMES A DAY 90 capsule 5    gabapentin (NEURONTIN) 300 mg capsule Take 1 capsule (300 mg total) by mouth 3 (three) times a day 90 capsule 5    ibuprofen (MOTRIN) 600 mg tablet as needed       lamoTRIgine (LAMICTAL) 150 MG tablet Take 1 tablet (150 mg total) by mouth 2 (two) times a day  0    Ubrogepant 50 MG TABS 1-2 tabs at migraine onset, repeat in 2 hours if needed  Max 2 per day  Hold DHE  10 tablet 0    verapamil (VERELAN PM) 120 MG 24 hr capsule Take 1 capsule (120 mg total) by mouth daily at bedtime 30 capsule 4    methylPREDNISolone 4 MG tablet therapy pack Use as directed on package (Patient not taking: Reported on 3/23/2020) 21 tablet 0    olopatadine (PATANOL) 0 1 % ophthalmic solution Administer 1 drop to both eyes 2 (two) times a day for 7 days 5 mL 0    sulfamethoxazole-trimethoprim (BACTRIM DS) 800-160 mg per tablet Take 1 tablet by mouth every 12 (twelve) hours for 10 days 20 tablet 0     No current facility-administered medications for this visit  Objective:    /84   Pulse 76   Temp (!) 97 2 °F (36 2 °C)   Resp 16   Ht 5' 9" (1 753 m)   Wt 84 8 kg (187 lb)   BMI 27 62 kg/m²        Physical Exam  Vitals signs and nursing note reviewed  Constitutional:       General: He is not in acute distress  Appearance: He is well-developed  He is ill-appearing  He is not diaphoretic  HENT:      Head: Normocephalic and atraumatic  Right Ear: External ear normal  Tympanic membrane is bulging  Left Ear: External ear normal  Tympanic membrane is erythematous and bulging  Nose: Nose normal       Mouth/Throat:      Pharynx: No oropharyngeal exudate  Eyes:      General: No scleral icterus  Right eye: No discharge  Left eye: No discharge        Pupils: Pupils are equal, round, and reactive to light  Neck:      Thyroid: No thyromegaly  Cardiovascular:      Rate and Rhythm: Normal rate  Heart sounds: Normal heart sounds  No murmur  Pulmonary:      Effort: Pulmonary effort is normal  No respiratory distress  Breath sounds: Normal breath sounds  No wheezing  Abdominal:      General: Bowel sounds are normal  There is no distension  Palpations: Abdomen is soft  There is no mass  Tenderness: There is no abdominal tenderness  There is no guarding or rebound  Musculoskeletal: Normal range of motion  Skin:     General: Skin is warm and dry  Findings: No erythema or rash  Neurological:      Mental Status: He is alert  Coordination: Coordination normal       Deep Tendon Reflexes: Reflexes normal    Psychiatric:         Behavior: Behavior normal                 Redell Clinton, DO  BMI Counseling: Body mass index is 27 62 kg/m²  The BMI is above normal  Nutrition recommendations include reducing portion sizes

## 2020-09-24 NOTE — TELEPHONE ENCOUNTER
I called Juli Valencia and did the [facility] Screening questions  Please see  He only answered yes to the cough but states that he always has a cough  He also told me that someone already called him 10 minutes ago and told him to come in to the office for an in-person appt   I am not sure who already called him but he is planning to come in  [facility]

## 2020-10-02 ENCOUNTER — TELEPHONE (OUTPATIENT)
Dept: NEUROLOGY | Facility: CLINIC | Age: 50
End: 2020-10-02

## 2020-10-02 ENCOUNTER — OFFICE VISIT (OUTPATIENT)
Dept: NEUROLOGY | Facility: CLINIC | Age: 50
End: 2020-10-02
Payer: COMMERCIAL

## 2020-10-02 VITALS
HEART RATE: 60 BPM | SYSTOLIC BLOOD PRESSURE: 123 MMHG | BODY MASS INDEX: 26.66 KG/M2 | DIASTOLIC BLOOD PRESSURE: 80 MMHG | HEIGHT: 69 IN | TEMPERATURE: 97.3 F | WEIGHT: 180 LBS

## 2020-10-02 DIAGNOSIS — G43.109 MIGRAINE WITH AURA AND WITHOUT STATUS MIGRAINOSUS, NOT INTRACTABLE: Primary | ICD-10-CM

## 2020-10-02 PROCEDURE — 1036F TOBACCO NON-USER: CPT | Performed by: NURSE PRACTITIONER

## 2020-10-02 PROCEDURE — 99214 OFFICE O/P EST MOD 30 MIN: CPT | Performed by: NURSE PRACTITIONER

## 2020-10-03 DIAGNOSIS — E78.00 HYPERCHOLESTEROLEMIA: ICD-10-CM

## 2020-10-05 ENCOUNTER — TRANSITIONAL CARE MANAGEMENT (OUTPATIENT)
Dept: NEUROLOGY | Facility: CLINIC | Age: 50
End: 2020-10-05

## 2020-10-05 RX ORDER — ATORVASTATIN CALCIUM 20 MG/1
TABLET, FILM COATED ORAL
Qty: 90 TABLET | Refills: 0 | Status: SHIPPED | OUTPATIENT
Start: 2020-10-05 | End: 2021-01-04

## 2020-11-16 ENCOUNTER — TELEPHONE (OUTPATIENT)
Dept: NEUROLOGY | Facility: CLINIC | Age: 50
End: 2020-11-16

## 2020-11-19 NOTE — PROGRESS NOTES
Return NeuroOutpatient Note        Gerri Del Toro  [id number]  32 y o   1970       Migraine (Botox f/u)        History obtained from:  Patient     HPI/Subjective:    Mr Daniel Steel is a 52 yo M with h/o migraines since age of 6 presents as follow up  He started getting botox therapy 2 years  His migraine frequency was 8+/month  Since botox therapy, he gets about 1-2/month  Heat usually triggers it  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  He had previously tried on topamax and since botox was working, he was taken off of them  He's doing quite well  He takes sumavel injections prn  It does work  He denies any side effects of botox  Since last session of botox, he's only had one migraine  He was interested in trying Aimovig so we had arranged for that  2 weeks ago, he took his first dose of Aimovig  He tolerated it well without any side effects  He is also on Verapmail 120mg daily       Past Medical History:   Diagnosis Date    Cubital tunnel syndrome     last assessed 1/6/14    Depression 11/07/2005    Hyperlipidemia     Hypertension     Migraine     Psychiatric disorder      Social History     Social History    Marital status:      Spouse name: N/A    Number of children: N/A    Years of education: N/A     Occupational History    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 [employer]      Social History Main Topics    Smoking status: Never Smoker    Smokeless tobacco: Never Used      Comment: exposure to second hand smoke    Alcohol use No    Drug use: No      Comment: social per [employer]    Sexual activity: Yes     Partners: Female     Other Topics Concern    Not on file     Social History Narrative    Daily tea consumption     per [employer]         Family History   Problem Relation Age of Onset   Janis Gakona Migraines Mother     Depression Mother     No Known Problems Father     Arthritis Family     Breast cancer Family     Heart disease Family         cardiac disorder    Hypertension Family     Breast cancer Sister    Janis Howard Asperger's syndrome Son      No Known Allergies  Current Outpatient Prescriptions on File Prior to Visit   Medication Sig Dispense Refill    atorvastatin (LIPITOR) 20 mg tablet TAKE ONE TABLET BY MOUTH DAILY 90 tablet 1    escitalopram (LEXAPRO) 20 mg tablet Take 1 tablet (20 mg total) by mouth daily  0    gabapentin (NEURONTIN) 300 mg capsule Take 1 capsule (300 mg total) by mouth 3 (three) times a day 90 capsule 5    lamoTRIgine (LAMICTAL) 150 MG tablet Take 1 tablet (150 mg total) by mouth 2 (two) times a day  0    SUMAtriptan Succinate 6 MG/0 5ML SOTJ Inject 0 5 mL (6 mg total) under the skin once as needed (migraine) for up to 1 dose 6 Syringe 1    verapamil (VERELAN PM) 120 MG 24 hr capsule Take 1 capsule (120 mg total) by mouth daily at bedtime 30 capsule 4     No current facility-administered medications on file prior to visit  Review of Systems   Refer to positive review of systems in HPI     Constitutional- No fever  Eyes- No visual change  ENT- Hearing normal  CV- No chest pain  Resp- No Shortness of breath  GI- No diarrhea  - Bladder normal  MS- No Arthritis   Skin- No rash  Psych- No depression  Endo- No DM  Heme- No nodes    Vitals:    08/27/18 1019   BP: 100/72   BP Location: Left arm   Patient Position: Sitting   Pulse: 78   Weight: 83 9 kg (185 lb)   Height: 5' 9" (1 753 m)       PHYSICAL EXAM:  Appearance: No Acute Distress  Ophthalmoscopic: Disc Flat, Normal fundus  Mental status:  Orientation: Awake, Alert, and Orientedx3  Memory: Registation 3/3 Recall 3/3  Attention: normal  Knowledge: good  Language: No aphasia  Speech: No dysarthria  Cranial Nerves:  2 No Visual Defect on Confrontation, Pupils round, equal, reactive to light  3,4,6 Extraocular Movements Intact, no nystagmus  5 Facial Sensation Intact  7 No facial asymmetry  8 Intact hearing  9,10 Palate symmetric, normal gag  11 Good shoulder shrug  12 Tongue Midline  Gait: Stable  Coordination: No ataxia with finger to nose testing, and heel to shin  Sensory: Intact, Symmetric to pinprick, light touch, vibration, and joint position  Muscle Tone: Normal              Muscle exam:  Arm Right Left Leg Right Left   Deltoid 5/5 5/5 Iliopsoas 5/5 5/5   Biceps 5/5 5/5 Quads 5/5 5/5   Triceps 5/5 5/5 Hamstrings 5/5 5/5   Wrist Extension 5/5 5/5 Ankle Dorsi Flexion 5/5 5/5   Wrist Flexion 5/5 5/5 Ankle Plantar Flexion 5/5 5/5   Interossei 5/5 5/5 Ankle Eversion 5/5 5/5   APB 5/5 5/5 Ankle Inversion 5/5 5/5       Reflexes   RJ BJ TJ KJ AJ Plantars Menard's   Right 2+ 2+ 2+ 2+ 2+ Downgoing Not present   Left 2+ 2+ 2+ 2+ 2+ Downgoing Not present     Personal review of  Labs:                  Diagnoses and all orders for this visit:        1  Chronic migraine without aura without status migrainosus, not intractable            Will resume daily verapamil and botox sessions once monthly  Will resume Aimovig once monthly to see if combination is more effective  Resume prn imitrex  Counseling Documentation:  The patient and/or patient's family were  counseled regarding diagnostic results  Instructions for management,risk factor reductions,prognosis of disease were discussed  Patient and family were educated regarding impressions,risks and benefits of treatment options,importance of compliance with treatment        Total time of encounter:  30 min  More than 50% of the time was used in counseling and/or coordination of care  Extent of counseling and/or coordination of care        Luis Carlos Sharma MD  [facility]  [address] [place] [address],[address]  [facility]   [phone] negative...

## 2020-11-30 ENCOUNTER — PROCEDURE VISIT (OUTPATIENT)
Dept: NEUROLOGY | Facility: CLINIC | Age: 50
End: 2020-11-30
Payer: COMMERCIAL

## 2020-11-30 VITALS — HEART RATE: 72 BPM | SYSTOLIC BLOOD PRESSURE: 117 MMHG | DIASTOLIC BLOOD PRESSURE: 72 MMHG | TEMPERATURE: 97.8 F

## 2020-11-30 DIAGNOSIS — G43.109 MIGRAINE WITH AURA AND WITHOUT STATUS MIGRAINOSUS, NOT INTRACTABLE: Primary | ICD-10-CM

## 2020-11-30 PROCEDURE — 64615 CHEMODENERV MUSC MIGRAINE: CPT | Performed by: PHYSICIAN ASSISTANT

## 2020-12-01 ENCOUNTER — OFFICE VISIT (OUTPATIENT)
Dept: FAMILY MEDICINE CLINIC | Facility: CLINIC | Age: 50
End: 2020-12-01
Payer: COMMERCIAL

## 2020-12-01 VITALS
BODY MASS INDEX: 27.85 KG/M2 | OXYGEN SATURATION: 98 % | RESPIRATION RATE: 20 BRPM | SYSTOLIC BLOOD PRESSURE: 110 MMHG | WEIGHT: 188 LBS | HEIGHT: 69 IN | HEART RATE: 70 BPM | TEMPERATURE: 97.6 F | DIASTOLIC BLOOD PRESSURE: 70 MMHG

## 2020-12-01 DIAGNOSIS — R05.3 CHRONIC COUGH: ICD-10-CM

## 2020-12-01 DIAGNOSIS — G43.709 CHRONIC MIGRAINE WITHOUT AURA WITHOUT STATUS MIGRAINOSUS, NOT INTRACTABLE: ICD-10-CM

## 2020-12-01 DIAGNOSIS — E78.00 HYPERCHOLESTEROLEMIA: ICD-10-CM

## 2020-12-01 DIAGNOSIS — F31.32 BIPOLAR AFFECTIVE DISORDER, CURRENTLY DEPRESSED, MODERATE (HCC): ICD-10-CM

## 2020-12-01 DIAGNOSIS — H66.92 LEFT OTITIS MEDIA, UNSPECIFIED OTITIS MEDIA TYPE: Primary | ICD-10-CM

## 2020-12-01 PROCEDURE — 3008F BODY MASS INDEX DOCD: CPT | Performed by: NURSE PRACTITIONER

## 2020-12-01 PROCEDURE — 99214 OFFICE O/P EST MOD 30 MIN: CPT | Performed by: NURSE PRACTITIONER

## 2020-12-01 PROCEDURE — 1036F TOBACCO NON-USER: CPT | Performed by: NURSE PRACTITIONER

## 2020-12-01 RX ORDER — AZITHROMYCIN 250 MG/1
TABLET, FILM COATED ORAL
Qty: 6 TABLET | Refills: 0 | Status: SHIPPED | OUTPATIENT
Start: 2020-12-01 | End: 2020-12-06

## 2021-01-01 DIAGNOSIS — E78.00 HYPERCHOLESTEROLEMIA: ICD-10-CM

## 2021-01-01 DIAGNOSIS — G43.709 CHRONIC MIGRAINE WITHOUT AURA WITHOUT STATUS MIGRAINOSUS, NOT INTRACTABLE: ICD-10-CM

## 2021-01-02 RX ORDER — ERENUMAB-AOOE 140 MG/ML
INJECTION, SOLUTION SUBCUTANEOUS
Qty: 1 PEN | Refills: 11 | Status: SHIPPED | OUTPATIENT
Start: 2021-01-02 | End: 2021-12-27

## 2021-01-04 RX ORDER — ATORVASTATIN CALCIUM 20 MG/1
TABLET, FILM COATED ORAL
Qty: 90 TABLET | Refills: 0 | Status: SHIPPED | OUTPATIENT
Start: 2021-01-04 | End: 2021-04-01

## 2021-01-05 DIAGNOSIS — H66.90 EAR INFECTION: Primary | ICD-10-CM

## 2021-01-05 RX ORDER — AMOXICILLIN AND CLAVULANATE POTASSIUM 875; 125 MG/1; MG/1
1 TABLET, FILM COATED ORAL EVERY 12 HOURS SCHEDULED
Qty: 14 TABLET | Refills: 0 | Status: SHIPPED | OUTPATIENT
Start: 2021-01-05 | End: 2021-01-12

## 2021-01-13 ENCOUNTER — OFFICE VISIT (OUTPATIENT)
Dept: PULMONOLOGY | Facility: MEDICAL CENTER | Age: 51
End: 2021-01-13
Payer: COMMERCIAL

## 2021-01-13 VITALS
DIASTOLIC BLOOD PRESSURE: 82 MMHG | WEIGHT: 189 LBS | BODY MASS INDEX: 27.99 KG/M2 | HEART RATE: 81 BPM | HEIGHT: 69 IN | OXYGEN SATURATION: 97 % | SYSTOLIC BLOOD PRESSURE: 124 MMHG | RESPIRATION RATE: 12 BRPM

## 2021-01-13 DIAGNOSIS — R05.3 CHRONIC COUGH: Primary | ICD-10-CM

## 2021-01-13 DIAGNOSIS — G43.109 MIGRAINE WITH AURA AND WITHOUT STATUS MIGRAINOSUS, NOT INTRACTABLE: ICD-10-CM

## 2021-01-13 PROCEDURE — 1036F TOBACCO NON-USER: CPT | Performed by: INTERNAL MEDICINE

## 2021-01-13 PROCEDURE — 99213 OFFICE O/P EST LOW 20 MIN: CPT | Performed by: INTERNAL MEDICINE

## 2021-01-13 NOTE — PROGRESS NOTES
Assessment/Plan        Problem List Items Addressed This Visit        Cardiovascular and Mediastinum    Migraine with aura and without status migrainosus, not intractable     Filled has history of chronic migraine for many years  States his headache almost every day  Botox has helped his headaches and he restarted taking Botox injections am   He states he gets these every 3 months  Does follow with neurologist Dr Eulalio Menezes  Other    Chronic cough - Primary     Field has chronic cough and has been on gabapentin 300 mg t i d  And this has been helping control his cough  Overall with use of gabapentin it is 80% improved from prior to gabapentin use  Not have any shortness of breath  Benzonatate does not help his cough much so he has not been using it  I did review last chest x-ray done July 14th with him and this shows normal lung parenchyma  There is some linear atelectasis scarring left lung base unchanged before  Slight elevation left diaphragm which is chronic  He is not have any shortness of breath or wheezing  Not have any gastric reflux or significant postnasal drainage  Will continue on gabapentin 300 mg t i d  For his 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                 Cc:  Still has periodic cough but has improved with use of gabapentin      [place]    Marcus Merchant has chronic cough for several years  He has been on gabapentin now 300 mg 3 times a day for cough hypersensitivity syndrome  Last chest x-ray done July 2019 showed normal lung parenchymal and minimal elevation left diaphragm with small area of linear atelectasis left lower lobe  He did have a CT of his chest done August 2018 which showed a small hiatal hernia but otherwise lungs were normal   He does use benzonatate 100 mg wanted to capsule twice a day as needed  More recent chest x-ray done July 14, 2020 showed stable linear atelectasis left lung base  No other abnormalities      Blood work from August 2020 showed C reactive protein to be in normal range an JOE was negative  Electrolytes liver enzymes are normal     He never smoked  Does have history of chronic migraine and bipolar affective disorder  He has gotten Botox injection for his migraines which have helped  He hardly ever needs to use the benzonatate  Says this does not help much  Does not have cough during the night  The Botox injections a restarted have been helping his headaches and he gets some every 3 months  Not having any shortness of breath  Overall with use of gabapentin he says his cough is been 80% improved  He does work for [employer] and is outdoors    Not having any shortness of breath or wheezing  No postnasal drainage or gastric reflux symptoms      Past Medical History:   Diagnosis Date    Cubital tunnel syndrome     last assessed 1/6/14    Depression 11/07/2005    Hyperlipidemia     Hypertension     Migraine     Psychiatric disorder        Past Surgical History:   Procedure Laterality Date    ELBOW ARTHROPLASTY Bilateral     right elbow reconstruction surgery and transposition surgery of the left elbow    KNEE ARTHROCENTESIS      SHOULDER ARTHROPLASTY      SHOULDER SURGERY  12/06/2018    AC Joint- Left    SINUS SURGERY      with bone spur removed in uvulectomy done  Had [facility] at the time, Dr Patricia Blankenship ENT    TONSILLECTOMY      tonsils removed and also removal of uvula, then a few years ago by Dr Patricia Blankenship  At that time he was having frequent sinus and throat infections, last assessed 12/19/16         Current Outpatient Medications:     Aimovig 140 MG/ML SOAJ, INJECT 140 MG UNDER THE SKIN EVERY 30 DAYS, Disp: 1 pen, Rfl: 11    atorvastatin (LIPITOR) 20 mg tablet, TAKE ONE TABLET BY MOUTH EVERY DAY, Disp: 90 tablet, Rfl: 0    benzonatate (TESSALON PERLES) 100 mg capsule, Take 2 capsules (200 mg total) by mouth 2 (two) times a day as needed for cough, Disp: 30 capsule, Rfl: 2    dihydroergotamine (MIGRANAL) 4 MG/ML nasal spray, 1 spray into each nostril as needed for migraine Use in one nostril as directed  No more than 4 sprays in one hour, Disp: 6 mL, Rfl: 0    escitalopram (LEXAPRO) 20 mg tablet, Take 1 tablet (20 mg total) by mouth daily, Disp: , Rfl: 0    gabapentin (NEURONTIN) 300 mg capsule, TAKE ONE CAPSULE THREE TIMES A DAY, Disp: 90 capsule, Rfl: 5    ibuprofen (MOTRIN) 600 mg tablet, as needed , Disp: , Rfl:     lamoTRIgine (LAMICTAL) 150 MG tablet, Take 1 tablet (150 mg total) by mouth 2 (two) times a day, Disp: , Rfl: 0    Ubrogepant 50 MG TABS, 1-2 tabs at migraine onset, repeat in 2 hours if needed  Max 2 per day  Hold DHE , Disp: 10 tablet, Rfl: 0    verapamil (VERELAN PM) 120 MG 24 hr capsule, Take 1 capsule (120 mg total) by mouth daily at bedtime, Disp: 30 capsule, Rfl: 4    olopatadine (PATANOL) 0 1 % ophthalmic solution, Administer 1 drop to both eyes 2 (two) times a day for 7 days (Patient not taking: Reported on 10/2/2020), Disp: 5 mL, Rfl: 0    No Known Allergies    Social History     Tobacco Use    Smoking status: Never Smoker    Smokeless tobacco: Never Used    Tobacco comment: never smoked   Substance Use Topics    Alcohol use: No         Family History   Problem Relation Age of Onset   [facility] Migraines Mother     Depression Mother     Alzheimer's disease Mother     No Known Problems Father     Arthritis Family     Breast cancer Family     Heart disease Family         cardiac disorder    Hypertension Family     Breast cancer Sister     Asperger's syndrome Son        Review of Systems   Constitutional: Negative for chills, fever and unexpected weight change  HENT: Negative for congestion, rhinorrhea and sore throat  Eyes: Negative for discharge and redness  Cardiovascular: Negative for chest pain, palpitations and leg swelling  Gastrointestinal: Negative for abdominal distention, abdominal pain and nausea  Endocrine: Negative for polydipsia and polyphagia     Genitourinary: Negative for dysuria  Musculoskeletal: Negative for joint swelling and myalgias  Skin: Negative for rash  Neurological: Negative for light-headedness  Psychiatric/Behavioral: Negative for confusion and decreased concentration  Vitals:    01/13/21 0804   BP: 124/82   Pulse: 81   Resp: 12   SpO2: 97%           Physical Exam  Constitutional:       General: He is not in acute distress  Appearance: He is well-developed  HENT:      Head: Normocephalic  Nose: Nose normal       Mouth/Throat:      Pharynx: No oropharyngeal exudate  Eyes:      Conjunctiva/sclera: Conjunctivae normal       Pupils: Pupils are equal, round, and reactive to light  Neck:      Musculoskeletal: Neck supple  No neck rigidity  Cardiovascular:      Rate and Rhythm: Normal rate and regular rhythm  Heart sounds: Normal heart sounds  Pulmonary:      Effort: Pulmonary effort is normal    Abdominal:      General: There is no distension  Palpations: Abdomen is soft  Tenderness: There is no abdominal tenderness  Musculoskeletal:         General: No swelling or deformity  Lymphadenopathy:      Cervical: No cervical adenopathy  Skin:     General: Skin is warm and dry  Neurological:      Mental Status: He is alert and oriented to person, place, and time  Psychiatric:         Mood and Affect: Mood normal          Behavior: Behavior normal          Thought Content:  Thought content normal            :

## 2021-01-13 NOTE — ASSESSMENT & PLAN NOTE
has chronic cough and has been on gabapentin 300 mg t i d  And this has been helping control his cough  Overall with use of gabapentin it is 80% improved from prior to gabapentin use  Not have any shortness of breath  Benzonatate does not help his cough much so he has not been using it  I did review last chest x-ray done July 14th with him and this shows normal lung parenchyma  There is some linear atelectasis scarring left lung base unchanged before  Slight elevation left diaphragm which is chronic  He is not have any shortness of breath or wheezing  Not have any gastric reflux or significant postnasal drainage  Will continue on gabapentin 300 mg t i d   For his 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

## 2021-01-13 NOTE — ASSESSMENT & PLAN NOTE
Filled has history of chronic migraine for many years  States his headache almost every day  Botox has helped his headaches and he restarted taking Botox injections am   He states he gets these every 3 months  Does follow with neurologist Dr Karlos Lozano

## 2021-01-29 ENCOUNTER — OFFICE VISIT (OUTPATIENT)
Dept: NEUROLOGY | Facility: CLINIC | Age: 51
End: 2021-01-29
Payer: COMMERCIAL

## 2021-01-29 VITALS
DIASTOLIC BLOOD PRESSURE: 80 MMHG | TEMPERATURE: 96.8 F | HEIGHT: 69 IN | SYSTOLIC BLOOD PRESSURE: 112 MMHG | BODY MASS INDEX: 26.66 KG/M2 | HEART RATE: 67 BPM | WEIGHT: 180 LBS

## 2021-01-29 DIAGNOSIS — G43.109 MIGRAINE WITH AURA AND WITHOUT STATUS MIGRAINOSUS, NOT INTRACTABLE: Primary | ICD-10-CM

## 2021-01-29 PROCEDURE — 99214 OFFICE O/P EST MOD 30 MIN: CPT | Performed by: NURSE PRACTITIONER

## 2021-01-29 PROCEDURE — 3008F BODY MASS INDEX DOCD: CPT | Performed by: INTERNAL MEDICINE

## 2021-01-29 NOTE — PATIENT INSTRUCTIONS
Continue with Aimovig 140mg every 30 days  Continue with Verapamil 120mg at bedtime  Continue with Rosa Mcginnis for abortive therapy of migraine  Hold DHE while using Ubrelvy  Follow up with Botox injection on 3/1/2021 at 0730 with Consuelo Melo in [facility]  Schedule 6 week follow up in the Neurology office

## 2021-01-29 NOTE — PROGRESS NOTES
Patient ID: Marcelina Bullock is a 53 y o  male  Assessment/Plan:     Diagnoses and all orders for this visit:    Migraine with aura and without status migrainosus, not intractable       Continue with Aimovig 140mg every 30 days  Continue with Verapamil 120mg at bedtime  Continue with Manual Riddles for abortive therapy of migraine  Hold DHE while using Ubrelvy  Follow up with Botox injection on 3/1/2021 at 0730 with Levi Tejeda in [facility]  Schedule 6 week follow up in the Neurology office    Subjective/HPI:  Marcelina Bullock is a 43-year-old male who follows with outpatient neurology for medical management of chronic migraine  Patient has a long history of migraine with multiple medications at times including multiple abortive medications  He ultimately had gone to Botox which was working fairly well however he did stop for brief period of time  With Botox patient managed to have 1 migraine approximately every week however after stopping he did progress back to 2-3 migraines per week  Chronic migraine characteristics are all over his head and noted that they exacerbated by heat  He has kept headache journal is however has not been able to identify specific triggers with the exception of possible weather  Currently patient is medically managed by:  Aimovig 140 mg every 30 days  Verapamil 120 mg daily at bedtime   He has used for abortive medications Maxalt, Imitrex and Fioricet in the past however these were not relatively effective for him  He has been using DHE nasal spray which does give him some relief of his migraine activity  He was also prescribed Manual Riddles a which was not started immediately due to PA needed however patient is currently able to have this at this time  Once started on this he is no longer taking DHE nasal spray  Pt reports that the Manual Riddles works within about 20 min  Patient presents the office today with minimal complaints   He was able to have Botox in November but noted that his session did not go well  He was not happy with the procedure but noted that he has done well with Migraine  He is getting about 3-4 migraine a month  He feels that the combination of the Aimovig and Botox injection is helping  Pt also on Verapamil 120mg at bedtime and he feels that this is also working for him  There is no change to the quality of his migraine when he has them, noted nausea and light sensitivity  He is happy with his current medication regimen  He has a follow up Botox injection on 3/1/2021 at 730am   Will have 6 week follow up post injection      The following portions of the patient's history were reviewed and updated as appropriate: allergies, current medications, past family history, past medical history, past social history, past surgical history and problem list         Past Medical History:   Diagnosis Date    Cubital tunnel syndrome     last assessed 1/6/14    Depression 11/07/2005    Hyperlipidemia     Hypertension     Migraine     Psychiatric disorder        Past Surgical History:   Procedure Laterality Date    ELBOW ARTHROPLASTY Bilateral     right elbow reconstruction surgery and transposition surgery of the left elbow    KNEE ARTHROCENTESIS      SHOULDER ARTHROPLASTY      SHOULDER SURGERY  12/06/2018    AC Joint- Left    SINUS SURGERY      with bone spur removed in uvulectomy done  Had yolanda LUGO at the time, Dr Nathan Meneses ENT    TONSILLECTOMY      tonsils removed and also removal of uvula, then a few years ago by Dr Nathan Meneses  At that time he was having frequent sinus and throat infections, last assessed 12/19/16       Social History     Socioeconomic History    Marital status:      Spouse name: None    Number of children: None    Years of education: None    Highest education level: None   Occupational History    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 [employer] Financial resource strain: None    Food insecurity     Worry: None     Inability: None    Transportation needs     Medical: None     Non-medical: None   Tobacco Use    Smoking status: Never Smoker    Smokeless tobacco: Never Used    Tobacco comment: never smoked   Substance and Sexual Activity    Alcohol use: No    Drug use: No     Comment: social per [employer]    Sexual activity: Yes     Partners: Female   Lifestyle    Physical activity     Days per week: None     Minutes per session: None    Stress: None   Relationships    Social connections     Talks on phone: None     Gets together: None     Attends Confucianism service: None     Active member of club or organization: None     Attends meetings of clubs or organizations: None     Relationship status: None    Intimate partner violence     Fear of current or ex partner: None     Emotionally abused: None     Physically abused: None     Forced sexual activity: None   Other Topics Concern    None   Social History Narrative    Daily tea consumption     per [employer]       Family History   Problem Relation Age of Onset    Migraines Mother     Depression Mother     Alzheimer's disease Mother     No Known Problems Father     Arthritis Family     Breast cancer Family     Heart disease Family         cardiac disorder    Hypertension Family     Breast cancer Sister     Asperger's syndrome Son          Current Outpatient Medications:     Aimovig 140 MG/ML SOAJ, INJECT 140 MG UNDER THE SKIN EVERY 30 DAYS, Disp: 1 pen, Rfl: 11    atorvastatin (LIPITOR) 20 mg tablet, TAKE ONE TABLET BY MOUTH EVERY DAY, Disp: 90 tablet, Rfl: 0    escitalopram (LEXAPRO) 20 mg tablet, Take 1 tablet (20 mg total) by mouth daily, Disp: , Rfl: 0    gabapentin (NEURONTIN) 300 mg capsule, TAKE ONE CAPSULE THREE TIMES A DAY, Disp: 90 capsule, Rfl: 5    ibuprofen (MOTRIN) 600 mg tablet, as needed , Disp: , Rfl:     lamoTRIgine (LAMICTAL) 150 MG tablet, Take 1 tablet (150 mg total) by mouth 2 (two) times a day, Disp: , Rfl: 0    Ubrogepant 50 MG TABS, 1-2 tabs at migraine onset, repeat in 2 hours if needed  Max 2 per day  Hold DHE , Disp: 10 tablet, Rfl: 0    verapamil (VERELAN PM) 120 MG 24 hr capsule, Take 1 capsule (120 mg total) by mouth daily at bedtime, Disp: 30 capsule, Rfl: 4    benzonatate (TESSALON PERLES) 100 mg capsule, Take 2 capsules (200 mg total) by mouth 2 (two) times a day as needed for cough (Patient not taking: Reported on 1/29/2021), Disp: 30 capsule, Rfl: 2    dihydroergotamine (MIGRANAL) 4 MG/ML nasal spray, 1 spray into each nostril as needed for migraine Use in one nostril as directed  No more than 4 sprays in one hour (Patient not taking: Reported on 1/29/2021), Disp: 6 mL, Rfl: 0    olopatadine (PATANOL) 0 1 % ophthalmic solution, Administer 1 drop to both eyes 2 (two) times a day for 7 days (Patient not taking: Reported on 10/2/2020), Disp: 5 mL, Rfl: 0    No Known Allergies     Blood pressure 112/80, pulse 67, temperature (!) 96 8 °F (36 °C), height 5' 9" (1 753 m), weight 81 6 kg (180 lb)  Objective:    Blood pressure 112/80, pulse 67, temperature (!) 96 8 °F (36 °C), height 5' 9" (1 753 m), weight 81 6 kg (180 lb)  Physical Exam  Vitals signs reviewed  Constitutional:       Appearance: He is well-developed  HENT:      Head: Normocephalic  Right Ear: Hearing normal       Left Ear: Hearing normal       Nose: Nose normal       Mouth/Throat:      Mouth: Mucous membranes are moist    Eyes:      General: Lids are normal       Extraocular Movements: Extraocular movements intact  Conjunctiva/sclera: Conjunctivae normal       Pupils: Pupils are equal, round, and reactive to light  Neck:      Musculoskeletal: Normal range of motion  Cardiovascular:      Rate and Rhythm: Normal rate  Pulmonary:      Effort: Pulmonary effort is normal  No respiratory distress  Abdominal:      Palpations: Abdomen is soft  Tenderness: There is no abdominal tenderness  Musculoskeletal: Normal range of motion     Skin:     General: Skin is warm and dry  Neurological:      Mental Status: He is alert  Coordination: Romberg sign negative  Deep Tendon Reflexes: Strength normal and reflexes are normal and symmetric  Psychiatric:         Speech: Speech normal          Behavior: Behavior normal          Thought Content: Thought content normal          Judgment: Judgment normal          Neurological Exam  Mental Status  Alert  Oriented to person, place, time and situation  Recent and remote memory are intact  Speech is normal  Language is fluent with no aphasia  Attention and concentration are normal  Fund of knowledge is appropriate for level of education  Cranial Nerves  CN II: Visual acuity is normal  Visual fields full to confrontation  CN III, IV, VI: Extraocular movements intact bilaterally  Normal lids and orbits bilaterally  Pupils equal round and reactive to light bilaterally  CN V: Facial sensation is normal   CN VII: Full and symmetric facial movement  CN VIII: Hearing is normal   Right: Hearing is normal   Left: Hearing is normal   CN IX, X: Palate elevates symmetrically  Normal gag reflex  CN XI: Shoulder shrug strength is normal   CN XII: Tongue midline without atrophy or fasciculations  Motor  Normal muscle bulk throughout  Normal muscle tone  No abnormal involuntary movements  Strength is 5/5 throughout all four extremities  Sensory  Light touch is normal in upper and lower extremities  Temperature is normal in upper and lower extremities  Vibration is normal in upper and lower extremities  Proprioception is normal in upper and lower extremities  Reflexes  Deep tendon reflexes are 2+ and symmetric in all four extremities with downgoing toes bilaterally  Right pathological reflexes: Aris's absent  Left pathological reflexes: Aris's absent      Coordination  Right: Finger-to-nose normal  Rapid alternating movement normal  Heel-to-shin normal   Left: Finger-to-nose normal  Rapid alternating movement normal  Heel-to-shin normal     Gait  Casual gait is normal including stance, stride, and arm swing  Normal toe walking  Normal heel walking  Normal tandem gait  Romberg is absent  Able to rise from chair without using arms  ROS:    Review of Systems   Constitutional: Negative  Negative for appetite change and fever  HENT: Negative  Negative for hearing loss, tinnitus, trouble swallowing and voice change  Eyes: Negative  Negative for photophobia and pain  Respiratory: Negative  Negative for shortness of breath  Cardiovascular: Negative  Negative for palpitations  Gastrointestinal: Negative  Negative for nausea and vomiting  Endocrine: Negative  Negative for cold intolerance  Genitourinary: Negative  Negative for dysuria, frequency and urgency  Musculoskeletal: Negative  Negative for myalgias and neck pain  Skin: Negative  Negative for rash  Allergic/Immunologic: Negative  Neurological: Negative  Negative for dizziness, tremors, seizures, syncope, facial asymmetry, speech difficulty, weakness, light-headedness, numbness and headaches  Hematological: Negative  Does not bruise/bleed easily  Psychiatric/Behavioral: Negative  Negative for confusion, hallucinations and sleep disturbance

## 2021-01-31 DIAGNOSIS — R05.9 COUGH: ICD-10-CM

## 2021-02-01 RX ORDER — GABAPENTIN 300 MG/1
CAPSULE ORAL
Qty: 90 CAPSULE | Refills: 5 | Status: SHIPPED | OUTPATIENT
Start: 2021-02-01 | End: 2021-03-02

## 2021-02-03 ENCOUNTER — TELEPHONE (OUTPATIENT)
Dept: NEUROLOGY | Facility: CLINIC | Age: 51
End: 2021-02-03

## 2021-02-03 NOTE — TELEPHONE ENCOUNTER
[address] [facility] Utilization Management @ [phone], spoke with Chaparro Farias  Advised I was calling to initiate pre-determination for patient for botox injections  Provided Chaparro Farias with CPT codes 71856 and  for Dx B97 [id number] under Asya Dunlap request for 4 visits for 1 year  Sol Bryan that medication will be obtained from [facility] and provide d Chaparro Farias with [facility]'s NPI and address, [facility] has been listed as dispensing pharmacy  Pre determination was started, Chaparro Farias ask that I fax clinical notes to Utilization Management @ [phone] with pending case ref# [id number]  Last office and procedure notes faxed, will await approval/denial letter and will call for status check in 3 days if determination is not yet received    Call ref # [id number]

## 2021-02-03 NOTE — TELEPHONE ENCOUNTER
Called [facility] @[phone], spoke with Caryle Deems, advised I was calling to inquire if prior authorization is needed for botox  Provided Caryle Deems with CPT code  for botox 200 units  Caryle Deems states that prior authorization is not needed through pharmacy benefits, patient's preferred specialty pharmacy for botox is [facility]  Will include [facility] as dispensing pharmacy when calling for pre-determination

## 2021-02-03 NOTE — TELEPHONE ENCOUNTER
Called [facility] member services @ [phone], spoke with Elvira Gardner, advised I was calling to enquire patient's eligibility and benefits for botox  Provided Elvira Gardner with CPT codes 71773 and  she advised that no authorization is needed for [address] however pre-determination is needed through Utilization Management for   She provided with number for utilization management to start predetermination  [phone]  Then asked Elvira Gardner if there is a preferred specialty pharmacy for patient, Elvira Gardner advised that patient has pharmacy benefits through [facility] [employer]  Will call [facility] @ [phone] to inquire if there is a preferred specialty pharmacy to obtain medication from  Elvira Gardner did advised that medication can be obtained via buy and bill      Call ref # [id number]

## 2021-02-15 ENCOUNTER — TELEPHONE (OUTPATIENT)
Dept: NEUROLOGY | Facility: CLINIC | Age: 51
End: 2021-02-15

## 2021-02-15 NOTE — TELEPHONE ENCOUNTER
Called to remind patient of their upcoming appointment with [facility] in [facility]  Asked if they are having any new or worsening symptoms or any urgent concerns prior to their appointment  Patient stated that they are doing well and nothing to report  Patient made aware that we will be calling back two days prior to appointment to complete COVID screening

## 2021-02-16 ENCOUNTER — TELEPHONE (OUTPATIENT)
Dept: NEUROLOGY | Facility: CLINIC | Age: 51
End: 2021-02-16

## 2021-02-16 NOTE — TELEPHONE ENCOUNTER
[address], spoke with Anneliese Perrin, advised I was calling to create a profile for patient  Provided Anneliese Perrin with patient's demographics and insurance infomration and profile was created  Anneliese Perrin then transferred me to a pharmacist to provide a verbal prescription  Spoke with pharmacist Cary, advised I was calling to provide a verbal prescription for botox  Provided verbal Rx for Botox 200 uniits QTY 1 under Dr Truong Conti for Dx [id number] with 3 refills  Cary has added order to their system with needs by dates of 2/18/2021   Will call to check status tomorrow to see if any further information is needed

## 2021-02-16 NOTE — TELEPHONE ENCOUNTER
Called [facility] @ [phone], spoke with Therese Hunt, advised I was calling to check the status of patient's botox authorization  Therese Hunt advised that authorization has been approved and provided me with the following approval information  Botox authorization # [id number] valid dates 02/03/2021 - 12/31/2021  Please use [facility]  Will attach referral and order medication in a separate encounter

## 2021-02-16 NOTE — TELEPHONE ENCOUNTER
Type Date User Summary Attachment   General 02/16/2021 10:16 AM Korina Ayon care coordination -   Note    Botox - authorization # [id number] - valid dates 02/03/2021 - 12/31/2021   Please use [facility]      Thanks  Angelina Morton

## 2021-02-17 NOTE — TELEPHONE ENCOUNTER
[address], spoke with Shawna Alejandro, advised I was calling to check the status of patient's botox order  Shawna Alejandro states that botox is ready to be scheduled for delivery however patient's consent is needed  Called patient with Shawna Alejadnro on the line, connected patient with [facility]  Shawna Alejandro advised patient that he has a co pay of $61 40, patient states that he is confused because he normally doesn't go through a specialty pharmacy  Patient states that before when he was getting medication from our stock he did not have a co-pay  Shawna Alejandro states that this is the amount that is coming back for patient responsibility from his insurance  Shawna Alejandro encouraged patient to reach out to his insurance and see if maybe coverage has changed from last calender year    Patient states he will reach out to [facility] to verify and call me back tomorrow with an update on how he would like to proceed

## 2021-02-19 NOTE — TELEPHONE ENCOUNTER
Received a call from patient, he states he called [facility] and was told that we do no have to go through specialty pharmacy, patient was advised that his benefits did not change from last year so we can still run as a buy and bill and patient would only be responsible for $30 co-pay  Will call [facility] to verify coverage for botox and if OK for buy and bill  Advised patient if okay for buy and bill, I will call [facility] and ask them to cancel the script for botox

## 2021-02-19 NOTE — TELEPHONE ENCOUNTER
Called [facility] eligibility/benefits @ [phone], spoke with Laila, advised I was calling to check benefits and coverage for botox injections  Provided Laila with CPT codes 41492 and  for Dx G43 709 ordered by Gama Ulloa provided me with the following benefit information:  Deductible: $$225- patient $0 20- family patient met: $0  Once the deductible is met the patient is covered at 90%, patient will be responsible for 10% of the co-insurance until her out of pocket is met  Patient has out of pocket amount of $1,450  The co-insurance will go towards the out of pocket allowable amount  Once met the patient is fully covered  Asked Laila if medication can be obtained via buy and bill or if it needs to go through a specialty pharmacy, Laila states that OK for buy and bill from providers office  Call ref # [id number]

## 2021-02-19 NOTE — TELEPHONE ENCOUNTER
[address], spoke with Mansoor Landaverde, advised I was calling to cancel patient's botox script on file  Mansoor Landaverde confirm that script on file has been cancelled

## 2021-02-24 NOTE — TELEPHONE ENCOUNTER
Called [employer] precertification, spoke with Stan Fowler, advised I was calling regarding [id number], calling to see if we can change the dispencing pharmacy from [facility] to a direct buy and bill from our office  Stan Fowler advised that she was able to change [id number] to a buy and bill  All authorization information will remain the same, we will receive a new authorization letter stating that our office is now the dispensing pharmacy  Call ref # [id number]

## 2021-02-24 NOTE — TELEPHONE ENCOUNTER
Called, spoke with patient, advised I was calling to inform him of coverage/benefits for upcoming botox visit advised patient of following benefit information:  Deductible: $$225- patient $0 20- family patient met: $0  [phone] and  both are subject to the deductible, patient's deductible is $225 and family deductible is $526 25 and so far $0 of deductible has been met  Once the deductible is met the patient is covered at 90%, patient will be responsible for 10% of the co-insurance until his out of pocket is met  Patient has out of pocket max of $1,450  The co-insurance will go towards the out of pocket allowable amount  Once out-of-pocket max is met the patient is fully covered  Patient verbalized understanding, spoke with patient at length about the botox savings program and mailed home literature for patient

## 2021-02-26 NOTE — TELEPHONE ENCOUNTER
Approval letter received with [facility] still listed as the billing provider  Called [facility] spoke with [facility]- I advised him I was calling to change the prior authorization from specialty pharmacy to a buy and bill  He advised me that the authorization still states [facility] but he can update this information for me now  He advised me that he was able to take [facility] off the authorization and place [address] [place] [address] on the authorization as a buy and bill  He advised me that we would receive a new approval letter in 24-48 hours      Call reference #: [id number]

## 2021-03-01 ENCOUNTER — PROCEDURE VISIT (OUTPATIENT)
Dept: NEUROLOGY | Facility: CLINIC | Age: 51
End: 2021-03-01
Payer: COMMERCIAL

## 2021-03-01 VITALS — TEMPERATURE: 97.8 F | HEART RATE: 67 BPM | SYSTOLIC BLOOD PRESSURE: 117 MMHG | DIASTOLIC BLOOD PRESSURE: 81 MMHG

## 2021-03-01 DIAGNOSIS — G43.709 CHRONIC MIGRAINE WITHOUT AURA WITHOUT STATUS MIGRAINOSUS, NOT INTRACTABLE: Primary | ICD-10-CM

## 2021-03-01 PROCEDURE — 64615 CHEMODENERV MUSC MIGRAINE: CPT | Performed by: PHYSICIAN ASSISTANT

## 2021-03-01 NOTE — PROGRESS NOTES
Universal Protocol   Consent: Verbal consent obtained  Written consent obtained    Risks and benefits: risks, benefits and alternatives were discussed  Consent given by: patient        Chemodenervation     Date/Time 3/1/2021 7:32 AM     Performed by  Fadumo Cleary PA-C     Authorized by Fadumo Cleary PA-C        Pre-procedure details      Prepped With: Alcohol     Procedure details     Position:  Upright   Botox     Botox Type:  Type A    Brand:  Botox    mL's of Botulinum Toxin:  155    Needle Gauge:  30 G 2 5 inch   Procedures     Botox Procedures: chronic headache      Indications: migraines     Injection Location      Head / Face:  L , R , L frontalis, R frontalis, R inferior cervical paraspinal, L inferior cervical paraspinal, L medial occipitalis, R medial occipitalis, L lateral occipitalis, R lateral occipitalis, procerus, L temporalis, R temporalis, R superior trapezius and L superior trapezius    L  injection amount:  5 unit(s)    R  injection amount:  5 unit(s)    L lateral frontalis:  5 unit(s)    R lateral frontalis:  5 unit(s)    L medial frontalis:  5 unit(s)    R medial frontalis:  5 unit(s)    L temporalis injection amount:  20 unit(s)    R temporalis injection amount:  20 unit(s)    Procerus injection amount:  5 unit(s)    L lateral occipitalis injection amount:  5 unit(s)    R lateral occipitalis injection amount:  5 unit(s)    L medial occipitalis injection amount:  10 unit(s)    R medial occipitalis injection amount:  10 unit(s)    L inferior cervical paraspinal injection amount:  10 unit(s)    R inferior cervical paraspinal injection amount:  10 unit(s)    L superior trapezius injection amount:  15 unit(s)    R superior trapezius injection amount:  15 unit(s)   Total Units     Total units used:  155    Total units discarded:  45   Post-procedure details      Chemodenervation:  Chronic migraine    Facial Nerve Location[de-identified]  Bilateral facial nerve Patient tolerance of procedure:   Tolerated well, no immediate complications       Blood pressure 117/81, pulse 67, temperature 97 8 °F (36 6 °C), temperature source Oral

## 2021-03-02 DIAGNOSIS — R05.9 COUGH: ICD-10-CM

## 2021-03-02 RX ORDER — GABAPENTIN 300 MG/1
CAPSULE ORAL
Qty: 90 CAPSULE | Refills: 5 | Status: SHIPPED | OUTPATIENT
Start: 2021-03-02 | End: 2022-06-27 | Stop reason: SDUPTHER

## 2021-03-04 ENCOUNTER — TELEPHONE (OUTPATIENT)
Dept: NEUROLOGY | Facility: CLINIC | Age: 51
End: 2021-03-04

## 2021-03-04 NOTE — TELEPHONE ENCOUNTER
----- Message from Alicia Alvarez PA-C sent at 3/4/2021  1:35 PM EST -----  Regarding: RE: STD request  Clinical/ clerical: Please have him send us the STD forms  Thanks   ----- Message -----  From: Angelina Coreas MD  Sent: 3/4/2021   1:30 PM EST  To: Ricki Lazo PA-C  Subject: RE: STD request                                  Normally no but he's never reported this before so I'm ok doing it once  ----- Message -----  From: Alicia Alvarez [place]  Sent: 3/4/2021  12:48 PM EST  To: Sesar Hardy MD  Subject: STD request                                      Dr Carmen Farah: This patient saw me for botox this past week  He is asking for STD for a couple weeks for migraines  Is this something you are agreeable to signing? Thanks in advance for considering      [address] [facility]

## 2021-03-04 NOTE — TELEPHONE ENCOUNTER
----- Message from Megha Louis MD sent at 3/4/2021  1:30 PM EST -----  Regarding: RE: STD request  Normally no but he's never reported this before so I'm ok doing it once  ----- Message -----  From: Lennox Gold, [place]  Sent: 3/4/2021  12:48 PM EST  To: Wilbert Rizvi MD  Subject: STD request                                      Dr Danny Vuong: This patient saw me for botox this past week  He is asking for STD for a couple weeks for migraines  Is this something you are agreeable to signing? Thanks in advance for considering      [address]

## 2021-03-04 NOTE — TELEPHONE ENCOUNTER
Please review other telephone encounter from 3/4:    "Shana Bartholomew         3/4/21 3:25 PM  Note     I called Triny Craft, and he will fax the STD forms directly to the [place] office next week "        Patient to have forms faxed to [place]

## 2021-03-04 NOTE — TELEPHONE ENCOUNTER
I called Triny Craft, and he will fax the STD forms directly to the [place] office next week

## 2021-03-16 ENCOUNTER — OFFICE VISIT (OUTPATIENT)
Dept: NEUROLOGY | Facility: CLINIC | Age: 51
End: 2021-03-16
Payer: COMMERCIAL

## 2021-03-16 VITALS
WEIGHT: 178 LBS | DIASTOLIC BLOOD PRESSURE: 82 MMHG | HEART RATE: 80 BPM | BODY MASS INDEX: 26.36 KG/M2 | HEIGHT: 69 IN | SYSTOLIC BLOOD PRESSURE: 124 MMHG

## 2021-03-16 DIAGNOSIS — R11.0 NAUSEA IN ADULT PATIENT: ICD-10-CM

## 2021-03-16 DIAGNOSIS — R53.83 OTHER FATIGUE: ICD-10-CM

## 2021-03-16 DIAGNOSIS — G43.719 INTRACTABLE CHRONIC MIGRAINE WITHOUT AURA AND WITHOUT STATUS MIGRAINOSUS: Primary | ICD-10-CM

## 2021-03-16 DIAGNOSIS — R53.82 CHRONIC FATIGUE: ICD-10-CM

## 2021-03-16 PROCEDURE — 1036F TOBACCO NON-USER: CPT | Performed by: PSYCHIATRY & NEUROLOGY

## 2021-03-16 PROCEDURE — 3008F BODY MASS INDEX DOCD: CPT | Performed by: PSYCHIATRY & NEUROLOGY

## 2021-03-16 PROCEDURE — 99215 OFFICE O/P EST HI 40 MIN: CPT | Performed by: PSYCHIATRY & NEUROLOGY

## 2021-03-16 NOTE — PROGRESS NOTES
Return NeuroOutpatient Note        Les Lambert  [id number]  49 y o   1970       Migraine        History obtained from:  Patient     HPI/Subjective:    Les Lambert is a 48 yo M with PMH of chronic migraines presents as f/u  Patient has been getting migraines since age of 6  He had reported migraine frequency of 8+ a month  He had concern for sob so wanted to hold off with botox which he did for about a year  He restarted botox in Aug of 2020  Today patient comes with complaint of fatigue and nausea  He reports constant ongoing headache  His headache feels more intense  This has been ongoing for past month  Denies any new meds, trauma  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  Over the years, he had been relatively well controlled with migraines all these years until recently  His father  of leukemia but he had it later in age       Past Medical History:   Diagnosis Date    Cubital tunnel syndrome     last assessed 14    Depression 2005    Hyperlipidemia     Hypertension     Migraine     Psychiatric disorder      Social History     Socioeconomic History    Marital status:      Spouse name: Not on file    Number of children: Not on file    Years of education: Not on file    Highest education level: Not on file   Occupational History    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 [employer] resource strain: Not on file    Food insecurity     Worry: Not on file     Inability: Not on file   [employer]ing needs     Medical: Not on file     Non-medical: Not on file   Tobacco Use    Smoking status: Never Smoker    Smokeless tobacco: Never Used    Tobacco comment: never smoked   Substance and Sexual Activity    Alcohol use: No    Drug use: No     Comment: social per [employer]    Sexual activity: Yes     Partners: Female   Lifestyle    Physical activity     Days per week: Not on file     Minutes per session: Not on file    Stress: Not on file   Relationships    Social connections     Talks on phone: Not on file     Gets together: Not on file     Attends Mandaeism service: Not on file     Active member of club or organization: Not on file     Attends meetings of clubs or organizations: Not on file     Relationship status: Not on file    Intimate partner violence     Fear of current or ex partner: Not on file     Emotionally abused: Not on file     Physically abused: Not on file     Forced sexual activity: Not on file   Other Topics Concern    Not on file   Social History Narrative    Daily tea consumption     per [employer]     Family History   Problem Relation Age of Onset    Migraines Mother     Depression Mother     Alzheimer's disease Mother     No Known Problems Father     Arthritis Family     Breast cancer Family     Heart disease Family         cardiac disorder    Hypertension Family     Breast cancer Sister     Asperger's syndrome Son      No Known Allergies  Current Outpatient Medications on File Prior to Visit   Medication Sig Dispense Refill    Aimovig 140 MG/ML SOAJ INJECT 140 MG UNDER THE SKIN EVERY 30 DAYS 1 pen 11    atorvastatin (LIPITOR) 20 mg tablet TAKE ONE TABLET BY MOUTH EVERY DAY 90 tablet 0    escitalopram (LEXAPRO) 20 mg tablet Take 1 tablet (20 mg total) by mouth daily  0    gabapentin (NEURONTIN) 300 mg capsule TAKE ONE CAPSULE BY MOUTH THREE TIMES A DAY 90 capsule 5    lamoTRIgine (LAMICTAL) 150 MG tablet Take 1 tablet (150 mg total) by mouth 2 (two) times a day  0    Ubrogepant 50 MG TABS 1-2 tabs at migraine onset, repeat in 2 hours if needed  Max 2 per day  Hold DHE   10 tablet 0    verapamil (VERELAN PM) 120 MG 24 hr capsule Take 1 capsule (120 mg total) by mouth daily at bedtime 30 capsule 4    benzonatate (TESSALON PERLES) 100 mg capsule Take 2 capsules (200 mg total) by mouth 2 (two) times a day as needed for cough (Patient not taking: Reported on 1/29/2021) 30 capsule 2    dihydroergotamine (MIGRANAL) 4 MG/ML nasal spray 1 spray into each nostril as needed for migraine Use in one nostril as directed  No more than 4 sprays in one hour (Patient not taking: Reported on 1/29/2021) 6 mL 0    ibuprofen (MOTRIN) 600 mg tablet as needed       olopatadine (PATANOL) 0 1 % ophthalmic solution Administer 1 drop to both eyes 2 (two) times a day for 7 days (Patient not taking: Reported on 10/2/2020) 5 mL 0     No current facility-administered medications on file prior to visit  Review of Systems   Refer to positive review of systems in HPI     Review of Systems    Constitutional- No fever  Eyes- No visual change  ENT- Hearing normal  CV- No chest pain  Resp- No Shortness of breath  GI- No diarrhea  - Bladder normal  MS- No Arthritis   Skin- No rash  Psych- No depression  Endo- No DM  Heme- No nodes    Vitals:    03/16/21 1439   BP: 124/82   BP Location: Left arm   Patient Position: Sitting   Cuff Size: Large   Pulse: 80   Weight: 80 7 kg (178 lb)   Height: 5' 9" (1 753 m)       PHYSICAL EXAM:  Appearance: No Acute Distress  Ophthalmoscopic: Disc Flat, Normal fundus  Mental status:  Orientation: Awake, Alert, and Orientedx3  Memory: Registation 3/3 Recall 3/3  Attention: normal  Knowledge: good  Language: No aphasia  Speech: No dysarthria  Cranial Nerves:  2 No Visual Defect on Confrontation, Pupils round, equal, reactive to light  3,4,6 Extraocular Movements Intact, no nystagmus  5 Facial Sensation Intact  7 No facial asymmetry  8 Intact hearing  9,10 Palate symmetric, normal gag  11 Good shoulder shrug  12 Tongue Midline  Gait: Stable  Coordination: No ataxia with finger to nose testing, and heel to shin  Sensory: Intact, Symmetric to pinprick, light touch, vibration, and joint position  Muscle Tone: Normal              Muscle exam:  Arm Right Left Leg Right Left   Deltoid 5/5 5/5 Iliopsoas 5/5 5/5   Biceps 5/5 5/5 Quads 5/5 5/5   Triceps 5/5 5/5 Hamstrings 5/5 5/5   Wrist Extension 5/5 5/5 Ankle Dorsi Flexion 5/5 5/5   Wrist Flexion 5/5 5/5 Ankle Plantar Flexion 5/5 5/5   Interossei 5/5 5/5 Ankle Eversion 5/5 5/5   APB 5/5 5/5 Ankle Inversion 5/5 5/5       Reflexes   RJ BJ TJ KJ AJ Plantars Menard's   Right 2+ 2+ 2+ 2+ 2+ Downgoing Not present   Left 2+ 2+ 2+ 2+ 2+ Downgoing Not present     Personal review of  Labs:                    Diagnoses and all orders for this visit:      1  Intractable chronic migraine without aura and without status migrainosus  MRI brain with and without contrast   2  Chronic fatigue  CBC and differential    Comprehensive metabolic panel    Iron Panel (Includes Ferritin, Iron Sat%, Iron, and TIBC)    Vitamin B12    Vitamin D 25 hydroxy   3  Nausea in adult patient  CBC and differential    Comprehensive metabolic panel    Iron Panel (Includes Ferritin, Iron Sat%, Iron, and TIBC)    Vitamin B12    Vitamin D 25 hydroxy   4  Other fatigue  CBC and differential    Comprehensive metabolic panel    Iron Panel (Includes Ferritin, Iron Sat%, Iron, and TIBC)    Vitamin B12    Vitamin D 25 hydroxy       Patient reports symptoms that are not related to his chronic migraines  Will get MRI brain to r/o structural process  Will check for CBC, CMP, iron panel, vit levels  Suggest a visit to his PCP to r/o other etiologies  He is to resume Aimovig, Ubrelvy, verapamil for headaches               Total time of encounter:  40 min  More than 50% of the time was used in counseling and/or coordination of care  Extent of counseling and/or coordination of care        MD [facility]  [address]  [address]  [phone]

## 2021-03-20 LAB
25(OH)D3+25(OH)D2 SERPL-MCNC: 18 NG/ML (ref 30–100)
ALBUMIN SERPL-MCNC: 4.6 G/DL (ref 4–5)
ALBUMIN/GLOB SERPL: 2.2 {RATIO} (ref 1.2–2.2)
ALP SERPL-CCNC: 86 IU/L (ref 39–117)
ALT SERPL-CCNC: 38 IU/L (ref 0–44)
AST SERPL-CCNC: 23 IU/L (ref 0–40)
BASOPHILS # BLD AUTO: 0.1 X10E3/UL (ref 0–0.2)
BASOPHILS NFR BLD AUTO: 1 %
BILIRUB SERPL-MCNC: 0.6 MG/DL (ref 0–1.2)
BUN SERPL-MCNC: 18 MG/DL (ref 6–24)
BUN/CREAT SERPL: 14 (ref 9–20)
CALCIUM SERPL-MCNC: 9.4 MG/DL (ref 8.7–10.2)
CHLORIDE SERPL-SCNC: 104 MMOL/L (ref 96–106)
CO2 SERPL-SCNC: 22 MMOL/L (ref 20–29)
CREAT SERPL-MCNC: 1.3 MG/DL (ref 0.76–1.27)
EOSINOPHIL # BLD AUTO: 0 X10E3/UL (ref 0–0.4)
EOSINOPHIL NFR BLD AUTO: 1 %
ERYTHROCYTE [DISTWIDTH] IN BLOOD BY AUTOMATED COUNT: 12.2 % (ref 11.6–15.4)
FERRITIN SERPL-MCNC: 171 NG/ML (ref 30–400)
GLOBULIN SER-MCNC: 2.1 G/DL (ref 1.5–4.5)
GLUCOSE SERPL-MCNC: 97 MG/DL (ref 65–99)
HCT VFR BLD AUTO: 46.4 % (ref 37.5–51)
HGB BLD-MCNC: 15.7 G/DL (ref 13–17.7)
IMM GRANULOCYTES # BLD: 0 X10E3/UL (ref 0–0.1)
IMM GRANULOCYTES NFR BLD: 0 %
IRON SATN MFR SERPL: 36 % (ref 15–55)
IRON SERPL-MCNC: 116 UG/DL (ref 38–169)
LYMPHOCYTES # BLD AUTO: 1.7 X10E3/UL (ref 0.7–3.1)
LYMPHOCYTES NFR BLD AUTO: 25 %
MCH RBC QN AUTO: 27.9 PG (ref 26.6–33)
MCHC RBC AUTO-ENTMCNC: 33.8 G/DL (ref 31.5–35.7)
MCV RBC AUTO: 82 FL (ref 79–97)
MONOCYTES # BLD AUTO: 0.4 X10E3/UL (ref 0.1–0.9)
MONOCYTES NFR BLD AUTO: 6 %
NEUTROPHILS # BLD AUTO: 4.5 X10E3/UL (ref 1.4–7)
NEUTROPHILS NFR BLD AUTO: 67 %
PLATELET # BLD AUTO: 328 X10E3/UL (ref 150–450)
POTASSIUM SERPL-SCNC: 4.4 MMOL/L (ref 3.5–5.2)
PROT SERPL-MCNC: 6.7 G/DL (ref 6–8.5)
RBC # BLD AUTO: 5.63 X10E6/UL (ref 4.14–5.8)
SL AMB EGFR AFRICAN AMERICAN: 74 ML/MIN/1.73
SL AMB EGFR NON AFRICAN AMERICAN: 64 ML/MIN/1.73
SODIUM SERPL-SCNC: 139 MMOL/L (ref 134–144)
TIBC SERPL-MCNC: 320 UG/DL (ref 250–450)
UIBC SERPL-MCNC: 204 UG/DL (ref 111–343)
VIT B12 SERPL-MCNC: 325 PG/ML (ref 232–1245)
WBC # BLD AUTO: 6.7 X10E3/UL (ref 3.4–10.8)

## 2021-03-22 ENCOUNTER — TELEPHONE (OUTPATIENT)
Dept: NEUROLOGY | Facility: CLINIC | Age: 51
End: 2021-03-22

## 2021-03-22 DIAGNOSIS — E55.9 VITAMIN D DEFICIENCY: Primary | ICD-10-CM

## 2021-03-22 RX ORDER — ERGOCALCIFEROL 1.25 MG/1
50000 CAPSULE ORAL WEEKLY
Qty: 12 CAPSULE | Refills: 0 | Status: SHIPPED | OUTPATIENT
Start: 2021-03-22 | End: 2022-01-10

## 2021-03-22 NOTE — TELEPHONE ENCOUNTER
I called and spoke with the patient  Please send the vitamin D Rx into his pharmacy, and he will call his PCP in regards to his creatinine level

## 2021-03-22 NOTE — TELEPHONE ENCOUNTER
----- Message from Miguel Angel Herrera MD sent at 3/22/2021 10:43 AM EDT -----  Patient's labs reveal vit D def for which I can prescribe once weekly supplement  His creatinine is slightly elevated to 1 3  ask him to discuss this with his PCP

## 2021-04-01 DIAGNOSIS — E78.00 HYPERCHOLESTEROLEMIA: ICD-10-CM

## 2021-04-01 RX ORDER — ATORVASTATIN CALCIUM 20 MG/1
TABLET, FILM COATED ORAL
Qty: 90 TABLET | Refills: 0 | Status: SHIPPED | OUTPATIENT
Start: 2021-04-01 | End: 2021-05-03

## 2021-04-07 ENCOUNTER — HOSPITAL ENCOUNTER (OUTPATIENT)
Dept: RADIOLOGY | Facility: HOSPITAL | Age: 51
Discharge: HOME | End: 2021-04-07
Attending: PSYCHIATRY & NEUROLOGY
Payer: COMMERCIAL

## 2021-04-07 DIAGNOSIS — G43.719 INTRACTABLE CHRONIC MIGRAINE WITHOUT AURA AND WITHOUT STATUS MIGRAINOSUS: ICD-10-CM

## 2021-04-07 PROCEDURE — G1004 CDSM NDSC: HCPCS

## 2021-04-07 PROCEDURE — 70553 MRI BRAIN STEM W/O & W/DYE: CPT

## 2021-04-07 PROCEDURE — A9585 GADOBUTROL INJECTION: HCPCS | Performed by: PSYCHIATRY & NEUROLOGY

## 2021-04-07 RX ADMIN — GADOBUTROL 8 ML: 604.72 INJECTION INTRAVENOUS at 08:37

## 2021-05-01 DIAGNOSIS — E78.00 HYPERCHOLESTEROLEMIA: ICD-10-CM

## 2021-05-03 RX ORDER — ATORVASTATIN CALCIUM 20 MG/1
TABLET, FILM COATED ORAL
Qty: 90 TABLET | Refills: 0 | Status: SHIPPED | OUTPATIENT
Start: 2021-05-03 | End: 2021-10-14 | Stop reason: SDUPTHER

## 2021-05-21 ENCOUNTER — OFFICE VISIT (OUTPATIENT)
Dept: NEUROLOGY | Facility: CLINIC | Age: 51
End: 2021-05-21
Payer: COMMERCIAL

## 2021-05-21 VITALS
HEIGHT: 69 IN | DIASTOLIC BLOOD PRESSURE: 86 MMHG | WEIGHT: 180 LBS | SYSTOLIC BLOOD PRESSURE: 141 MMHG | BODY MASS INDEX: 26.66 KG/M2 | HEART RATE: 88 BPM

## 2021-05-21 DIAGNOSIS — G43.719 INTRACTABLE CHRONIC MIGRAINE WITHOUT AURA AND WITHOUT STATUS MIGRAINOSUS: ICD-10-CM

## 2021-05-21 DIAGNOSIS — G43.709 CHRONIC MIGRAINE WITHOUT AURA WITHOUT STATUS MIGRAINOSUS, NOT INTRACTABLE: Primary | ICD-10-CM

## 2021-05-21 PROCEDURE — 3008F BODY MASS INDEX DOCD: CPT | Performed by: FAMILY MEDICINE

## 2021-05-21 PROCEDURE — 99213 OFFICE O/P EST LOW 20 MIN: CPT | Performed by: PHYSICIAN ASSISTANT

## 2021-05-21 NOTE — PROGRESS NOTES
Patient ID: Hannah Reyes is a 49 y o  male  Assessment/Plan:     Diagnoses and all orders for this visit:    Chronic migraine without aura without status migrainosus, not intractable  -     Nerve Stimulator (Cefaly Electrode) MISC; Apply once daily prn migraine for 20 minutes  Intractable chronic migraine without aura and without status migrainosus         Migraines are stable  Since starting a combination of Aimovig and Botox, he reports at least a 50% reduction of migraines  Plan to continue Aimovig Q 30 days, Botox injections Q 90 days, verapamil 120 mg 24 hour capsule q h s , Lamictal 150 mg q 12 hours  He takes gabapentin 300 mg q 8 hours for other reasons, but this may be assisting somewhat with migraines  P r n  he can use Ubrelvy  mg at the onset of a migraine, repeat after 2 hours if needed  Discussed alternative conservative therapies for migraine including cephaly, nerivio device, acupressure  The patient should not hesitate to call me prior to his follow up with any questions or concerns  Subjective:    HPI    Mr Hannah Reyes is a very pleasant 50-year-old male who is here for neurological follow-up for chronic migraine headaches  Recently headaches became worse and brain MRI was repeated with and without contrast on 4/7/2021, and thankfully unremarkable  Vitamin D was 18, otherwise all other blood work was unremarkable  B12 was on the lower limit of normal at 325  TIBC, ferritin, CMP and CBC with diff all within normal limits, except for slightly elevated creatinine at 1 30  Vitamin-D 39300 units weekly was prescribed  The patient feels better recently  At the last visit he was noted to have 8+ migraines per month, and he currently reports 5-6 per month approximately  He took [place] and [place] today and he states that it helps, but it takes anywhere from 30-60 minute to kick in  He denies side effects  He also utilizes ice packs    He also continues the same doses of Lamictal, verapamil and Aimovig injectable  Since adding Aimovig to the Botox the patient reports a significant reduction of migraine headaches  He feels that the combination together has kept them in better control  Currently migraine headache is triggered by possible environmental/ seasonal allergies  He takes over-the-counter allergy medication, but the allergies still trigger migraines  Otherwise stress and tension are big triggers  Prior documentation: He had concern for sob so wanted to hold off with botox which he did for about a year  He restarted botox in Aug of 2020  Today patient comes with complaint of fatigue and nausea  He reports constant ongoing headache  His headache feels more intense  This has been ongoing for past month  Denies any new meds, trauma  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  Over the years, he had been relatively well controlled with migraines all these years until recently  His father  of leukemia but he had it later in age  The following portions of the patient's history were reviewed and updated as appropriate:   He  has a past medical history of Cubital tunnel syndrome, Depression (2005), Hyperlipidemia, Hypertension, Migraine, and Psychiatric disorder    He   Patient Active Problem List    Diagnosis Date Noted    Intractable chronic migraine without aura and without status migrainosus 2021    Acute non-recurrent maxillary sinusitis 2019    Postnasal drip 2019    Migraine with aura and without status migrainosus, not intractable 2019    Astigmatism of right eye 2018    Chronic migraine without aura without status migrainosus, not intractable 2018    Chronic cough 2018    Hypercholesterolemia 2012    Benign familial tremor 2012    Bipolar affective disorder, currently depressed, moderate ([facility] [address] ) 2012    Persistent insomnia of non-organic origin 09/04/2012     He  has a past surgical history that includes SHOULDER ARTHROPLASTY; Knee arthrocentesis; Elbow Arthroplasty (Bilateral); TONSILLECTOMY; Sinus surgery; and Shoulder surgery (12/06/2018)  His family history includes Alzheimer's disease in his mother; Arthritis in his family; Asperger's syndrome in his son; Breast cancer in his family and sister; Depression in his mother; Heart disease in his family; Hypertension in his family; Migraines in his mother; No Known Problems in his father  He  reports that he has never smoked  He has never used smokeless tobacco  He reports that he does not drink alcohol or use drugs  Current Outpatient Medications   Medication Sig Dispense Refill    Aimovig 140 MG/ML SOAJ INJECT 140 MG UNDER THE SKIN EVERY 30 DAYS 1 pen 11    atorvastatin (LIPITOR) 20 mg tablet TAKE ONE TABLET BY MOUTH EVERY DAY 90 tablet 0    ergocalciferol (VITAMIN D2) 50,000 units Take 1 capsule (50,000 Units total) by mouth once a week for 12 doses 12 capsule 0    escitalopram (LEXAPRO) 20 mg tablet Take 1 tablet (20 mg total) by mouth daily  0    gabapentin (NEURONTIN) 300 mg capsule TAKE ONE CAPSULE BY MOUTH THREE TIMES A DAY 90 capsule 5    lamoTRIgine (LAMICTAL) 150 MG tablet Take 1 tablet (150 mg total) by mouth 2 (two) times a day  0    Ubrogepant 50 MG TABS 1-2 tabs at migraine onset, repeat in 2 hours if needed  Max 2 per day  Hold DHE  10 tablet 0    verapamil (VERELAN PM) 120 MG 24 hr capsule Take 1 capsule (120 mg total) by mouth daily at bedtime 30 capsule 4    benzonatate (TESSALON PERLES) 100 mg capsule Take 2 capsules (200 mg total) by mouth 2 (two) times a day as needed for cough (Patient not taking: Reported on 1/29/2021) 30 capsule 2    dihydroergotamine (MIGRANAL) 4 MG/ML nasal spray 1 spray into each nostril as needed for migraine Use in one nostril as directed    No more than 4 sprays in one hour (Patient not taking: Reported on 1/29/2021) 6 mL 0    ibuprofen (MOTRIN) 600 mg tablet as needed       Nerve Stimulator (Cefaly Electrode) MISC Apply once daily prn migraine for 20 minutes  1 each 0    olopatadine (PATANOL) 0 1 % ophthalmic solution Administer 1 drop to both eyes 2 (two) times a day for 7 days (Patient not taking: Reported on 10/2/2020) 5 mL 0     No current facility-administered medications for this visit  He has No Known Allergies            Objective:    Blood pressure 141/86, pulse 88, height 5' 9" (1 753 m), weight 81 6 kg (180 lb)  Body mass index is 26 58 kg/m²  Physical Exam    Neurological Exam  On neurologic exam, the patient is alert and oriented to time and place  Speech is fluent and articulate, and the patient follows commands appropriately  Judgment and affect appear normal  Pupils are equally round and reactive to light and extraocular muscles are intact without nystagmus  Face is symmetric, and tongue, uvula, and palate are midline  Hearing is intact  Motor examination reveals intact strength throughout  Normal gait is steady  ROS:    Review of Systems   Constitutional: Negative  Negative for appetite change and fever  HENT: Negative  Negative for hearing loss, tinnitus, trouble swallowing and voice change  Eyes: Negative  Negative for photophobia and pain  Respiratory: Negative  Negative for shortness of breath  Cardiovascular: Negative  Negative for palpitations  Gastrointestinal: Negative  Negative for nausea and vomiting  Endocrine: Negative  Negative for cold intolerance  Genitourinary: Negative  Negative for dysuria, frequency and urgency  Musculoskeletal: Negative  Negative for myalgias and neck pain  Skin: Negative  Negative for rash  Neurological: Positive for headaches (migraine now )  Negative for dizziness, tremors, seizures, syncope, facial asymmetry, speech difficulty, weakness, light-headedness and numbness  Hematological: Negative  Does not bruise/bleed easily     Psychiatric/Behavioral: Negative  Negative for confusion, hallucinations and sleep disturbance  All other systems reviewed and are negative  The following portions of the patient's history were reviewed and updated as appropriate: allergies, current medications/ medication history, past family history, past medical history, past social history, past surgical history and problem list     Review of systems was reviewed and otherwise unremarkable from a neurological perspective

## 2021-05-27 ENCOUNTER — TELEMEDICINE (OUTPATIENT)
Dept: FAMILY MEDICINE CLINIC | Facility: CLINIC | Age: 51
End: 2021-05-27
Payer: COMMERCIAL

## 2021-05-27 DIAGNOSIS — J01.90 ACUTE SINUSITIS, RECURRENCE NOT SPECIFIED, UNSPECIFIED LOCATION: Primary | ICD-10-CM

## 2021-05-27 PROCEDURE — 99213 OFFICE O/P EST LOW 20 MIN: CPT | Performed by: FAMILY MEDICINE

## 2021-05-27 PROCEDURE — 1036F TOBACCO NON-USER: CPT | Performed by: FAMILY MEDICINE

## 2021-05-27 RX ORDER — AMOXICILLIN 875 MG/1
875 TABLET, COATED ORAL 2 TIMES DAILY
Qty: 14 TABLET | Refills: 0 | Status: SHIPPED | OUTPATIENT
Start: 2021-05-27 | End: 2021-06-03

## 2021-05-27 NOTE — PROGRESS NOTES
Virtual Regular Visit      Assessment/Plan:    Problem List Items Addressed This Visit     None      Visit Diagnoses     Acute sinusitis, recurrence not specified, unspecified location    -  Primary    Relevant Medications    amoxicillin (AMOXIL) 875 mg tablet        Counseled on supportive care  Return if symptoms worsen or fail to improve  Continue Mucinex and Flonase  Reason for visit is   Chief Complaint   Patient presents with    Virtual Regular Visit        Encounter provider Ana María Gramajo MD    Provider located at [address] [id number]      Recent Visits  No visits were found meeting these conditions  Showing recent visits within past 7 days and meeting all other requirements     Today's Visits  Date Type Provider Dept   05/27/21 Telemedicine Ana María Gramajo, [address] today's visits and meeting all other requirements     Future Appointments  No visits were found meeting these conditions  Showing future appointments within next 150 days and meeting all other requirements        The patient was identified by name and date of birth  Power Cristobal was informed that this is a telemedicine visit and that the visit is being conducted through [address] [place] [address] Now and patient was informed that this is a secure, HIPAA-compliant platform  He agrees to proceed     My office door was closed  No one else was in the room  He acknowledged consent and understanding of privacy and security of the video platform  The patient has agreed to participate and understands they can discontinue the visit at any time  Patient is aware this is a billable service  Subjective  Power Cristobal is a 49 y o  male   Sinus Problem  This is a new problem  The current episode started in the past 7 days  The problem has been gradually worsening since onset  There has been no fever  The pain is moderate   Associated symptoms include chills, congestion, ear pain, headaches, sinus pressure, sneezing and a sore throat  Pertinent negatives include no coughing, diaphoresis, hoarse voice, neck pain, shortness of breath or swollen glands  Past treatments include oral decongestants  The treatment provided no relief  Past Medical History:   Diagnosis Date    Cubital tunnel syndrome     last assessed 1/6/14    Depression 11/07/2005    Hyperlipidemia     Hypertension     Migraine     Psychiatric disorder        Past Surgical History:   Procedure Laterality Date    ELBOW ARTHROPLASTY Bilateral     right elbow reconstruction surgery and transposition surgery of the left elbow    KNEE ARTHROCENTESIS      SHOULDER ARTHROPLASTY      SHOULDER SURGERY  12/06/2018    AC Joint- Left    SINUS SURGERY      with bone spur removed in uvulectomy done  Had [facility] at the time, Dr Mario Castro [facility]    TONSILLECTOMY      tonsils removed and also removal of uvula, then a few years ago by Dr Mario Castro  At that time he was having frequent sinus and throat infections, last assessed 12/19/16       Current Outpatient Medications   Medication Sig Dispense Refill    Aimovig 140 MG/ML SOAJ INJECT 140 MG UNDER THE SKIN EVERY 30 DAYS 1 pen 11    amoxicillin (AMOXIL) 875 mg tablet Take 1 tablet (875 mg total) by mouth 2 (two) times a day for 7 days 14 tablet 0    atorvastatin (LIPITOR) 20 mg tablet TAKE ONE TABLET BY MOUTH EVERY DAY 90 tablet 0    benzonatate (TESSALON PERLES) 100 mg capsule Take 2 capsules (200 mg total) by mouth 2 (two) times a day as needed for cough (Patient not taking: Reported on 1/29/2021) 30 capsule 2    dihydroergotamine (MIGRANAL) 4 MG/ML nasal spray 1 spray into each nostril as needed for migraine Use in one nostril as directed    No more than 4 sprays in one hour (Patient not taking: Reported on 1/29/2021) 6 mL 0    ergocalciferol (VITAMIN D2) 50,000 units Take 1 capsule (50,000 Units total) by mouth once a week for 12 doses 12 capsule 0    escitalopram (LEXAPRO) 20 mg tablet Take 1 tablet (20 mg total) by mouth daily  0    gabapentin (NEURONTIN) 300 mg capsule TAKE ONE CAPSULE BY MOUTH THREE TIMES A DAY 90 capsule 5    ibuprofen (MOTRIN) 600 mg tablet as needed       lamoTRIgine (LAMICTAL) 150 MG tablet Take 1 tablet (150 mg total) by mouth 2 (two) times a day  0    Nerve Stimulator (Cefaly Electrode) MISC Apply once daily prn migraine for 20 minutes  1 each 0    olopatadine (PATANOL) 0 1 % ophthalmic solution Administer 1 drop to both eyes 2 (two) times a day for 7 days (Patient not taking: Reported on 10/2/2020) 5 mL 0    Ubrogepant 50 MG TABS 1-2 tabs at migraine onset, repeat in 2 hours if needed  Max 2 per day  Hold DHE  10 tablet 0    verapamil (VERELAN PM) 120 MG 24 hr capsule Take 1 capsule (120 mg total) by mouth daily at bedtime 30 capsule 4     No current facility-administered medications for this visit  No Known Allergies    Review of Systems   Constitutional: Positive for chills  Negative for diaphoresis  HENT: Positive for congestion, ear pain, sinus pressure, sneezing and sore throat  Negative for hoarse voice  Respiratory: Negative for cough and shortness of breath  Musculoskeletal: Negative for neck pain  Neurological: Positive for headaches  Video Exam    There were no vitals filed for this visit  Physical Exam  Constitutional:       General: He is not in acute distress  Appearance: He is well-developed  He is not diaphoretic  HENT:      Head: Normocephalic and atraumatic  Eyes:      General: No scleral icterus  Right eye: No discharge  Left eye: No discharge  Conjunctiva/sclera: Conjunctivae normal    Neck:      Musculoskeletal: Normal range of motion  Pulmonary:      Effort: Pulmonary effort is normal    Skin:     General: Skin is warm  Neurological:      Mental Status: He is alert and oriented to person, place, and time     Psychiatric:         Behavior: Behavior normal  Thought Content: Thought content normal          Judgment: Judgment normal           I spent 15 minutes directly with the patient during this visit      VIRTUAL VISIT DISCLAIMER    Valentín Lara acknowledges that he has consented to an online visit or consultation  He understands that the online visit is based solely on information provided by him, and that, in the absence of a face-to-face physical evaluation by the physician, the diagnosis he receives is both limited and provisional in terms of accuracy and completeness  This is not intended to replace a full medical face-to-face evaluation by the physician  Valentín Lara understands and accepts these terms

## 2021-06-03 ENCOUNTER — TELEPHONE (OUTPATIENT)
Dept: NEUROLOGY | Facility: CLINIC | Age: 51
End: 2021-06-03

## 2021-06-03 NOTE — TELEPHONE ENCOUNTER
While prepping patient's new botox appointment I noticed that authorization letter for Rj Irahetachase was never received  Antoniamarjorie Makidomingo  @ [phone] spoke with Deanne, advised I was calling to confirm authorization # [id number] was changed to a buy and bill as request in Feb and if so if we can have copy of the authorization letter faxed to our office  Deanne states that she sees a note from February asking for authorization to be changed however authorization is still listed under [employer] [place]a states she will need to get me in touch with a member of the nursing team to change authorization  Call ref # [id number]  Was transferred to a voicemail box for nurse   Left message requesting a call back to change authorization to buy and bill and have authorization letter refaxed  Will await call back and will follow up in 2 days if not received

## 2021-06-15 ENCOUNTER — DOCUMENTATION (OUTPATIENT)
Dept: NEUROLOGY | Facility: CLINIC | Age: 51
End: 2021-06-15

## 2021-06-15 NOTE — PROGRESS NOTES
Type Date User Summary Attachment   General 06/15/2021  9:41 AM Maggie Moore care coordination -   Note    Botox - authorization # [id number] - 2nd visit - valid dates 2/3/2021 - 12/31/2021   Please use our stock      Thanks  Stephanie Romano

## 2021-06-15 NOTE — PROGRESS NOTES
Patient is scheduled 6/23/2021 with [facility] in [facility]

## 2021-06-15 NOTE — TELEPHONE ENCOUNTER
Called [facility], spoke with Lele Dobson, advised I was calling in regards to authorization # [id number], stated I left a message over a week ago on the nurse line asking for a call back and has not been returned  Recapped that I needed authorization on file changed from [address] to [address] [facility] and copy of the letter faxed to our office  Lele Dobson placed me on hold to see if she is able to assist in having authorization changed to [facility]   Lele Dobson was able to open case and have our facility changed added to authorization, she states that all authorization details will remain the same and we will receive a new authorization letter faxed to our office in the next 24-72 hours  Call ref# [id number]

## 2021-06-23 ENCOUNTER — PROCEDURE VISIT (OUTPATIENT)
Dept: NEUROLOGY | Facility: CLINIC | Age: 51
End: 2021-06-23
Payer: COMMERCIAL

## 2021-06-23 VITALS — SYSTOLIC BLOOD PRESSURE: 110 MMHG | HEART RATE: 74 BPM | TEMPERATURE: 96.1 F | DIASTOLIC BLOOD PRESSURE: 60 MMHG

## 2021-06-23 DIAGNOSIS — G43.709 CHRONIC MIGRAINE WITHOUT AURA: Primary | ICD-10-CM

## 2021-06-23 PROCEDURE — 64615 CHEMODENERV MUSC MIGRAINE: CPT | Performed by: PHYSICIAN ASSISTANT

## 2021-06-23 NOTE — PROGRESS NOTES
Universal Protocol   Consent: Verbal consent obtained  Written consent obtained    Risks and benefits: risks, benefits and alternatives were discussed  Consent given by: patient  Patient understanding: patient states understanding of the procedure being performed  Patient consent: the patient's understanding of the procedure matches consent given  Procedure consent: procedure consent matches procedure scheduled        Chemodenervation     Date/Time 6/23/2021 2:04 PM     Performed by  Nirmal Pedroza PA-C     Authorized by Nirmal Pedroza PA-C        Pre-procedure details      Prepped With: Alcohol     Procedure details     Position:  Upright   Botox     Botox Type:  Type A    Brand:  Botox and Xeomin    mL's of Botulinum Toxin:  155    Final Concentration per CC:  100 units    Needle Gauge:  30 G 2 5 inch   Procedures     Botox Procedures: chronic headache      Indications: migraines     Injection Location      Head / Face:  L superior trapezius, R superior trapezius, L superior cervical paraspinal, R superior cervical paraspinal, L , R , procerus, L temporalis, R temporalis, R frontalis, L frontalis, R medial occipitalis and L medial occipitalis    L  injection amount:  5 unit(s)    R  injection amount:  5 unit(s)    L lateral frontalis:  5 unit(s)    R lateral frontalis:  5 unit(s)    L medial frontalis:  5 unit(s)    R medial frontalis:  5 unit(s)    L temporalis injection amount:  20 unit(s)    R temporalis injection amount:  20 unit(s)    Procerus injection amount:  5 unit(s)    L medial occipitalis injection amount:  15 unit(s)    R medial occipitalis injection amount:  15 unit(s)    L superior cervical paraspinal injection amount:  10 unit(s)    R superior cervical paraspinal injection amount:  10 unit(s)    L superior trapezius injection amount:  15 unit(s)    R superior trapezius injection amount:  15 unit(s)   Total Units     Total units used:  155    Total units discarded:  45   Post-procedure details      Chemodenervation:  Chronic migraine    Facial Nerve Location[de-identified]  Bilateral facial nerve    Patient tolerance of procedure:   Tolerated well, no immediate complications       Blood pressure 110/60, pulse 74, temperature (!) 96 1 °F (35 6 °C), temperature source Temporal

## 2021-06-25 NOTE — TELEPHONE ENCOUNTER
Called [facility] spoke with Jovani Ta, advised I was calling to follow up on authorization # [id number], recapped that I called last week and was told that authorization would be updated to [facility] as both the dispensing and the rendering provider  Jovani Ta reviewed authorization and states that [facility] is still listed on the authorization  Jovani Ta states she will need to get me in touch with the nurse  assigned to patient's case to update this  I asked if there was any way she could make sure I get connected to the  and not just her voicemail as I have left multiple messages already regarding this  Jovani Ta placed me on hold while she attempted to reach  twice but was unsuccessful, she left them a message to call our office back to change authorization information  Jovani Ta is also sending this to her supervisor to ensure this is followed up on  She then transferred me to 's voicemail, I left a detailed message that we have left multiple messages requesting a call back regarding this authorization    Call ref # [id number]

## 2021-08-06 ENCOUNTER — OFFICE VISIT (OUTPATIENT)
Dept: NEUROLOGY | Facility: CLINIC | Age: 51
End: 2021-08-06
Payer: COMMERCIAL

## 2021-08-06 VITALS
WEIGHT: 183.6 LBS | HEIGHT: 69 IN | BODY MASS INDEX: 27.19 KG/M2 | HEART RATE: 95 BPM | DIASTOLIC BLOOD PRESSURE: 78 MMHG | SYSTOLIC BLOOD PRESSURE: 116 MMHG

## 2021-08-06 DIAGNOSIS — G43.709 CHRONIC MIGRAINE WITHOUT AURA WITHOUT STATUS MIGRAINOSUS, NOT INTRACTABLE: Primary | ICD-10-CM

## 2021-08-06 PROCEDURE — 1036F TOBACCO NON-USER: CPT | Performed by: PHYSICIAN ASSISTANT

## 2021-08-06 PROCEDURE — 99214 OFFICE O/P EST MOD 30 MIN: CPT | Performed by: PHYSICIAN ASSISTANT

## 2021-08-06 PROCEDURE — 3008F BODY MASS INDEX DOCD: CPT | Performed by: PHYSICIAN ASSISTANT

## 2021-08-06 NOTE — PROGRESS NOTES
Patient ID: Marleen Alvarado is a 49 y o  male  Assessment/Plan:       Diagnoses and all orders for this visit:    Chronic migraine without aura without status migrainosus, not intractable        Migraines are better since using Cefaly  Also since starting a combination of Aimovig and Botox, he reports at least a 50% reduction of migraines      Plan to continue Aimovig Q 30 days, Botox injections Q 90 days, verapamil 120 mg 24 hour capsule q h s , Lamictal 150 mg q 12 hours  He takes gabapentin 300 mg q 8 hours for other reasons, but this may be assisting somewhat with migraines  P r n  he can use Ubrelvy  mg at the onset of a migraine, repeat after 2 hours if needed  The patient should not hesitate to call me prior to his follow up with any questions or concerns  Subjective:    HPI    Mr Marleen Alvarado is a very pleasant 56-year-old male who is here for neurological follow-up for chronic migraine headaches  He recently purchased the [employer] device  He reports significant reduction of headaches since using this daily for prevention  He states at least or >80% reduction of headaches since using this last month      He continues the same doses of Lamictal, verapamil and Aimovig injectable  Since adding Aimovig to the Botox the patient reports a significant reduction of migraine headaches  He feels that the combination together has kept them in better control  He uses ubrelvy PRN  Denies s/e to all of the above      Currently migraine headache is triggered by possible environmental/ seasonal allergies  He takes over-the-counter allergy medication, but the allergies still trigger migraines  Otherwise stress and tension are big triggers- both of his sons are leaving home- one for college for the first time      Prior documentation: He had concern for sob so wanted to hold off with botox which he did for about a year  He restarted botox in Aug of 2020   Today patient comes with complaint of fatigue and nausea  He reports constant ongoing headache  His headache feels more intense   This has been ongoing for past month  Denies any new meds, trauma  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  Over the years, he had been relatively well controlled with migraines all these years until recently  His father  of leukemia but he had it later in age      The following portions of the patient's history were reviewed and updated as appropriate:   He  has a past medical history of Cubital tunnel syndrome, Depression (2005), Hyperlipidemia, Hypertension, Migraine, and Psychiatric disorder  He   Patient Active Problem List    Diagnosis Date Noted    Chronic migraine without aura without status migrainosus, not intractable 2021    Intractable chronic migraine without aura and without status migrainosus 2021    Acute non-recurrent maxillary sinusitis 2019    Postnasal drip 2019    Migraine with aura and without status migrainosus, not intractable 2019    Astigmatism of right eye 2018    Chronic migraine without aura 2018    Chronic cough 2018    Hypercholesterolemia 2012    Benign familial tremor 2012    Bipolar affective disorder, currently depressed, moderate ([facility] [address] ) 2012    Persistent insomnia of non-organic origin 2012     He  has a past surgical history that includes SHOULDER ARTHROPLASTY; Knee arthrocentesis; Elbow Arthroplasty (Bilateral); TONSILLECTOMY; Sinus surgery; and Shoulder surgery (2018)  His family history includes Alzheimer's disease in his mother; Arthritis in his family; Asperger's syndrome in his son; Breast cancer in his family and sister; Depression in his mother; Heart disease in his family; Hypertension in his family; Migraines in his mother; No Known Problems in his father  He  reports that he has never smoked   He has never used smokeless tobacco  He reports that he does not drink alcohol and does not use drugs  Current Outpatient Medications   Medication Sig Dispense Refill    Aimovig 140 MG/ML SOAJ INJECT 140 MG UNDER THE SKIN EVERY 30 DAYS 1 pen 11    atorvastatin (LIPITOR) 20 mg tablet TAKE ONE TABLET BY MOUTH EVERY DAY 90 tablet 0    escitalopram (LEXAPRO) 20 mg tablet Take 1 tablet (20 mg total) by mouth daily  0    gabapentin (NEURONTIN) 300 mg capsule TAKE ONE CAPSULE BY MOUTH THREE TIMES A DAY 90 capsule 5    ibuprofen (MOTRIN) 600 mg tablet as needed       lamoTRIgine (LAMICTAL) 150 MG tablet Take 1 tablet (150 mg total) by mouth 2 (two) times a day  0    Nerve Stimulator (Cefaly Electrode) MISC Apply once daily prn migraine for 20 minutes  1 each 0    Ubrogepant 50 MG TABS 1-2 tabs at migraine onset, repeat in 2 hours if needed  Max 2 per day  Hold DHE  10 tablet 0    verapamil (VERELAN PM) 120 MG 24 hr capsule Take 1 capsule (120 mg total) by mouth daily at bedtime 30 capsule 4    benzonatate (TESSALON PERLES) 100 mg capsule Take 2 capsules (200 mg total) by mouth 2 (two) times a day as needed for cough (Patient not taking: Reported on 1/29/2021) 30 capsule 2    dihydroergotamine (MIGRANAL) 4 MG/ML nasal spray 1 spray into each nostril as needed for migraine Use in one nostril as directed  No more than 4 sprays in one hour (Patient not taking: Reported on 1/29/2021) 6 mL 0    ergocalciferol (VITAMIN D2) 50,000 units Take 1 capsule (50,000 Units total) by mouth once a week for 12 doses 12 capsule 0    olopatadine (PATANOL) 0 1 % ophthalmic solution Administer 1 drop to both eyes 2 (two) times a day for 7 days (Patient not taking: Reported on 10/2/2020) 5 mL 0     No current facility-administered medications for this visit  He has No Known Allergies            Objective:    Blood pressure 116/78, pulse 95, height 5' 9" (1 753 m), weight 83 3 kg (183 lb 9 6 oz)  Body mass index is 27 11 kg/m²      Physical Exam    Neurological Exam  Vital signs reviewed  Well developed, well nourished  Head: Normocephalic, atraumatic  CN 5-17: intact and symmetric, including EOMs which are normal b/l and PERRL  MSK: 5/5 t/o  ROM normal x all 4 extr  Sensation: Inact to LT x4 extr  Romberg negative  Reflexes: 2+ and symmetric in all 4 extr  , except 1+ ankles  Non focal t/o  Coordination: Nml x4 extr  Gait: Steady normal gait, tandem gait is steady  ROS:    Review of Systems   Constitutional: Negative  Negative for appetite change and fever  HENT: Negative  Negative for hearing loss, tinnitus, trouble swallowing and voice change  Eyes: Positive for photophobia  Negative for pain  Respiratory: Negative  Negative for shortness of breath  Cardiovascular: Negative  Negative for palpitations  Gastrointestinal: Negative  Negative for nausea and vomiting  Endocrine: Negative  Negative for cold intolerance  Genitourinary: Negative  Negative for dysuria, frequency and urgency  Musculoskeletal: Negative  Negative for myalgias and neck pain  Skin: Negative  Negative for rash  Neurological: Positive for headaches  Negative for dizziness, tremors, seizures, syncope, facial asymmetry, speech difficulty, weakness, light-headedness and numbness  Hematological: Negative  Does not bruise/bleed easily  Psychiatric/Behavioral: Negative  Negative for confusion, hallucinations and sleep disturbance  The following portions of the patient's history were reviewed and updated as appropriate: allergies, current medications/ medication history, past family history, past medical history, past social history, past surgical history and problem list     Review of systems was reviewed and otherwise unremarkable from a neurological perspective    I have spent 30 minutes with the patient today in which greater than 50% of this time was spent in counseling/coordination of care regarding diagnoses, test results, impression, plan and potential medication side effects

## 2021-08-16 NOTE — TELEPHONE ENCOUNTER
Called [facility] spoke with Christina, advised I was calling to obtain an authorization letter that we have already requested multiple times  Christina was able to pull up authorization #[id number], she confirmed that authorization on file is for a buy and bill, I requested a copy of the letter be faxed to our office  Christina states that she will need to send a request to a specialized team to complete this request, I advised again that I have already requested this letter several times and have not received this information  Christina states she will send this as an expedited request to ensure our request is worked    Call ref # [id number]

## 2021-09-07 ENCOUNTER — TELEPHONE (OUTPATIENT)
Dept: NEUROLOGY | Facility: CLINIC | Age: 51
End: 2021-09-07

## 2021-09-07 NOTE — TELEPHONE ENCOUNTER
Called pt to change time slots for his Botox appointment with [address] on 9/27 per [address]  No answer, left message to call me back

## 2021-09-10 ENCOUNTER — TELEPHONE (OUTPATIENT)
Dept: NEUROLOGY | Facility: CLINIC | Age: 51
End: 2021-09-10

## 2021-09-10 NOTE — TELEPHONE ENCOUNTER
Megha Ceja called as his employer stated that his FMLA forms faxed on 8/13 were not received even though we received a confirmation sheet      Forms resent to new fax at [phone] (documents sent electronically) today at 10:13 am

## 2021-09-17 ENCOUNTER — TELEPHONE (OUTPATIENT)
Dept: NEUROLOGY | Facility: CLINIC | Age: 51
End: 2021-09-17

## 2021-09-27 ENCOUNTER — PROCEDURE VISIT (OUTPATIENT)
Dept: NEUROLOGY | Facility: CLINIC | Age: 51
End: 2021-09-27
Payer: COMMERCIAL

## 2021-09-27 VITALS — TEMPERATURE: 98.1 F | SYSTOLIC BLOOD PRESSURE: 129 MMHG | HEART RATE: 84 BPM | DIASTOLIC BLOOD PRESSURE: 79 MMHG

## 2021-09-27 DIAGNOSIS — G43.709 CHRONIC MIGRAINE WITHOUT AURA WITHOUT STATUS MIGRAINOSUS, NOT INTRACTABLE: Primary | ICD-10-CM

## 2021-09-27 PROCEDURE — 64615 CHEMODENERV MUSC MIGRAINE: CPT | Performed by: PHYSICIAN ASSISTANT

## 2021-09-27 NOTE — PROGRESS NOTES
Universal Protocol   Consent: Verbal consent obtained  Written consent obtained    Risks and benefits: risks, benefits and alternatives were discussed  Consent given by: patient  Patient understanding: patient states understanding of the procedure being performed  Patient consent: the patient's understanding of the procedure matches consent given  Procedure consent: procedure consent matches procedure scheduled        Chemodenervation     Date/Time 9/27/2021 4:56 PM     Performed by  Kira Hathaway PA-C     Authorized by Kira Hathaway PA-C        Pre-procedure details      Prepped With: Alcohol     Procedure details     Position:  Upright   Botox     Botox Type:  Type A    Brand:  Botox    mL's of Botulinum Toxin:  155    Final Concentration per CC:  100 units    Needle Gauge:  30 G 2 5 inch   Procedures     Botox Procedures: chronic headache      Indications: migraines     Injection Location      Head / Face:  L superior trapezius, R superior trapezius, L superior cervical paraspinal, R superior cervical paraspinal, L , R , procerus, L temporalis, R temporalis, R frontalis, L frontalis, R medial occipitalis and L medial occipitalis    L  injection amount:  5 unit(s)    R  injection amount:  5 unit(s)    L lateral frontalis:  5 unit(s)    R lateral frontalis:  5 unit(s)    L medial frontalis:  5 unit(s)    R medial frontalis:  5 unit(s)    L temporalis injection amount:  20 unit(s)    R temporalis injection amount:  20 unit(s)    Procerus injection amount:  5 unit(s)    L medial occipitalis injection amount:  15 unit(s)    R medial occipitalis injection amount:  15 unit(s)    L superior cervical paraspinal injection amount:  10 unit(s)    R superior cervical paraspinal injection amount:  10 unit(s)    L superior trapezius injection amount:  15 unit(s)    R superior trapezius injection amount:  15 unit(s)   Total Units     Total units used:  155    Total units discarded: 45   Post-procedure details      Chemodenervation:  Chronic migraine    Facial Nerve Location[de-identified]  Bilateral facial nerve    Patient tolerance of procedure: Tolerated well, no immediate complications       Blood pressure 129/79, pulse 84, temperature 98 1 °F (36 7 °C), temperature source Tympanic  He continues to have improvement of migraine headaches with the cefaly

## 2021-10-14 DIAGNOSIS — E78.00 HYPERCHOLESTEROLEMIA: ICD-10-CM

## 2021-10-14 RX ORDER — ATORVASTATIN CALCIUM 20 MG/1
20 TABLET, FILM COATED ORAL DAILY
Qty: 90 TABLET | Refills: 0 | Status: SHIPPED | OUTPATIENT
Start: 2021-10-14 | End: 2022-01-10 | Stop reason: SDUPTHER

## 2021-11-17 ENCOUNTER — OFFICE VISIT (OUTPATIENT)
Dept: FAMILY MEDICINE CLINIC | Facility: CLINIC | Age: 51
End: 2021-11-17
Payer: COMMERCIAL

## 2021-11-17 VITALS
TEMPERATURE: 97.6 F | DIASTOLIC BLOOD PRESSURE: 74 MMHG | RESPIRATION RATE: 16 BRPM | HEIGHT: 69 IN | HEART RATE: 72 BPM | BODY MASS INDEX: 28.58 KG/M2 | SYSTOLIC BLOOD PRESSURE: 118 MMHG | WEIGHT: 193 LBS

## 2021-11-17 DIAGNOSIS — J01.00 ACUTE NON-RECURRENT MAXILLARY SINUSITIS: Primary | ICD-10-CM

## 2021-11-17 DIAGNOSIS — F31.32 BIPOLAR AFFECTIVE DISORDER, CURRENTLY DEPRESSED, MODERATE (HCC): ICD-10-CM

## 2021-11-17 DIAGNOSIS — G43.109 MIGRAINE WITH AURA AND WITHOUT STATUS MIGRAINOSUS, NOT INTRACTABLE: ICD-10-CM

## 2021-11-17 PROCEDURE — 1036F TOBACCO NON-USER: CPT | Performed by: NURSE PRACTITIONER

## 2021-11-17 PROCEDURE — 99214 OFFICE O/P EST MOD 30 MIN: CPT | Performed by: NURSE PRACTITIONER

## 2021-11-17 PROCEDURE — 3008F BODY MASS INDEX DOCD: CPT | Performed by: NURSE PRACTITIONER

## 2021-11-17 RX ORDER — FLUTICASONE PROPIONATE 50 MCG
2 SPRAY, SUSPENSION (ML) NASAL DAILY
Qty: 16 G | Refills: 1 | Status: SHIPPED | OUTPATIENT
Start: 2021-11-17 | End: 2022-03-09 | Stop reason: ALTCHOICE

## 2021-11-17 RX ORDER — AZITHROMYCIN 250 MG/1
TABLET, FILM COATED ORAL
Qty: 6 TABLET | Refills: 0 | Status: SHIPPED | OUTPATIENT
Start: 2021-11-17 | End: 2021-11-22

## 2021-12-02 ENCOUNTER — TELEPHONE (OUTPATIENT)
Dept: NEUROLOGY | Facility: CLINIC | Age: 51
End: 2021-12-02

## 2021-12-20 ENCOUNTER — TELEPHONE (OUTPATIENT)
Dept: NEUROLOGY | Facility: CLINIC | Age: 51
End: 2021-12-20

## 2021-12-27 DIAGNOSIS — G43.709 CHRONIC MIGRAINE WITHOUT AURA WITHOUT STATUS MIGRAINOSUS, NOT INTRACTABLE: ICD-10-CM

## 2021-12-27 RX ORDER — ERENUMAB-AOOE 140 MG/ML
INJECTION, SOLUTION SUBCUTANEOUS
Qty: 1 ML | Refills: 11 | Status: SHIPPED | OUTPATIENT
Start: 2021-12-27

## 2022-01-05 ENCOUNTER — PROCEDURE VISIT (OUTPATIENT)
Dept: NEUROLOGY | Facility: CLINIC | Age: 52
End: 2022-01-05
Payer: COMMERCIAL

## 2022-01-05 VITALS — DIASTOLIC BLOOD PRESSURE: 76 MMHG | SYSTOLIC BLOOD PRESSURE: 116 MMHG | TEMPERATURE: 97.7 F

## 2022-01-05 DIAGNOSIS — G43.709 CHRONIC MIGRAINE WITHOUT AURA WITHOUT STATUS MIGRAINOSUS, NOT INTRACTABLE: Primary | ICD-10-CM

## 2022-01-05 PROCEDURE — 64615 CHEMODENERV MUSC MIGRAINE: CPT | Performed by: PHYSICIAN ASSISTANT

## 2022-01-05 NOTE — PROGRESS NOTES
Universal Protocol   Consent: Verbal consent obtained  Written consent obtained    Risks and benefits: risks, benefits and alternatives were discussed  Consent given by: patient  Patient understanding: patient states understanding of the procedure being performed  Patient consent: the patient's understanding of the procedure matches consent given  Procedure consent: procedure consent matches procedure scheduled        Chemodenervation     Date/Time 1/5/2022 3:02 PM     Performed by  Elmer Roberts PA-C     Authorized by Elmer Roberts PA-C        Pre-procedure details      Prepped With: Alcohol     Procedure details     Position:  Upright   Botox     Botox Type:  Type A    Brand:  Botox    mL's of Botulinum Toxin:  155    Final Concentration per CC:  100 units    Needle Gauge:  30 G 2 5 inch   Procedures     Botox Procedures: chronic headache      Indications: migraines     Injection Location      Head / Face:  L superior trapezius, R superior trapezius, L superior cervical paraspinal, R superior cervical paraspinal, L , R , procerus, L temporalis, R temporalis, R frontalis, L frontalis, R medial occipitalis and L medial occipitalis    L  injection amount:  5 unit(s)    R  injection amount:  5 unit(s)    L lateral frontalis:  5 unit(s)    R lateral frontalis:  5 unit(s)    L medial frontalis:  5 unit(s)    R medial frontalis:  5 unit(s)    L temporalis injection amount:  20 unit(s)    R temporalis injection amount:  20 unit(s)    Procerus injection amount:  5 unit(s)    L medial occipitalis injection amount:  15 unit(s)    R medial occipitalis injection amount:  15 unit(s)    L superior cervical paraspinal injection amount:  10 unit(s)    R superior cervical paraspinal injection amount:  10 unit(s)    L superior trapezius injection amount:  15 unit(s)    R superior trapezius injection amount:  15 unit(s)   Total Units     Total units used:  155    Total units discarded:  45 Post-procedure details      Chemodenervation:  Chronic migraine    Facial Nerve Location[de-identified]  Bilateral facial nerve    Patient tolerance of procedure: Tolerated well, no immediate complications       Blood pressure 116/76, temperature 97 7 °F (36 5 °C), temperature source Tympanic

## 2022-01-10 ENCOUNTER — OFFICE VISIT (OUTPATIENT)
Dept: FAMILY MEDICINE CLINIC | Facility: CLINIC | Age: 52
End: 2022-01-10
Payer: COMMERCIAL

## 2022-01-10 VITALS
DIASTOLIC BLOOD PRESSURE: 74 MMHG | BODY MASS INDEX: 28.79 KG/M2 | HEART RATE: 93 BPM | TEMPERATURE: 99.5 F | HEIGHT: 69 IN | WEIGHT: 194.4 LBS | SYSTOLIC BLOOD PRESSURE: 124 MMHG | RESPIRATION RATE: 17 BRPM

## 2022-01-10 DIAGNOSIS — Z13.6 SCREENING FOR CARDIOVASCULAR CONDITION: ICD-10-CM

## 2022-01-10 DIAGNOSIS — B96.89 ACUTE BACTERIAL SINUSITIS: Primary | ICD-10-CM

## 2022-01-10 DIAGNOSIS — E78.00 HYPERCHOLESTEROLEMIA: ICD-10-CM

## 2022-01-10 DIAGNOSIS — Z12.5 SCREENING FOR PROSTATE CANCER: ICD-10-CM

## 2022-01-10 DIAGNOSIS — J01.90 ACUTE BACTERIAL SINUSITIS: Primary | ICD-10-CM

## 2022-01-10 DIAGNOSIS — Z11.59 NEED FOR HEPATITIS C SCREENING TEST: ICD-10-CM

## 2022-01-10 DIAGNOSIS — F31.32 BIPOLAR AFFECTIVE DISORDER, CURRENTLY DEPRESSED, MODERATE (HCC): ICD-10-CM

## 2022-01-10 PROCEDURE — 99213 OFFICE O/P EST LOW 20 MIN: CPT | Performed by: FAMILY MEDICINE

## 2022-01-10 PROCEDURE — 3008F BODY MASS INDEX DOCD: CPT | Performed by: FAMILY MEDICINE

## 2022-01-10 PROCEDURE — 1036F TOBACCO NON-USER: CPT | Performed by: FAMILY MEDICINE

## 2022-01-10 RX ORDER — ATORVASTATIN CALCIUM 20 MG/1
20 TABLET, FILM COATED ORAL DAILY
Qty: 90 TABLET | Refills: 0 | Status: SHIPPED | OUTPATIENT
Start: 2022-01-10 | End: 2022-04-18 | Stop reason: SDUPTHER

## 2022-01-10 RX ORDER — PREDNISONE 20 MG/1
TABLET ORAL
Qty: 32 TABLET | Refills: 0 | Status: SHIPPED | OUTPATIENT
Start: 2022-01-10 | End: 2022-03-09 | Stop reason: ALTCHOICE

## 2022-01-10 RX ORDER — AMOXICILLIN AND CLAVULANATE POTASSIUM 875; 125 MG/1; MG/1
1 TABLET, FILM COATED ORAL EVERY 12 HOURS SCHEDULED
Qty: 20 TABLET | Refills: 0 | Status: SHIPPED | OUTPATIENT
Start: 2022-01-10 | End: 2022-01-20

## 2022-01-10 NOTE — PROGRESS NOTES
Assessment/Plan:    1  Acute bacterial sinusitis  -     amoxicillin-clavulanate (Augmentin) 875-125 mg per tablet; Take 1 tablet by mouth every 12 (twelve) hours for 10 days  -     predniSONE 20 mg tablet; 4 tabs for three days, 3 tabs for three days, 2 tabs for three days, 1 tab for three days, 1/2 tab for 4 days    2  Hypercholesterolemia  -     atorvastatin (LIPITOR) 20 mg tablet; Take 1 tablet (20 mg total) by mouth daily    3  Bipolar affective disorder, currently depressed, moderate ([facility] [address] )    4  Need for hepatitis C screening test  -     Hepatitis C antibody; Future    5  Screening for prostate cancer  -     PSA, Total Screen; Future    6  Screening for cardiovascular condition  -     Comprehensive metabolic panel; Future  -     Lipid Panel with Direct LDL reflex; Future            There are no Patient Instructions on file for this visit  No follow-ups on file  Subjective:      Patient ID: Kee Asher is a 49 y o  male  Chief Complaint   Patient presents with    Earache     symptoms started last week nm  lpn    Nasal Congestion       Pt states he feels he has a sinus or ear infection  States it started a few months ago but it never cleared up  Pt is vaccinated against covid - was already tested and it was negative  Temp is 99  Taste and smell is fine        The following portions of the patient's history were reviewed and updated as appropriate: allergies, current medications, past family history, past medical history, past social history, past surgical history and problem list     Review of Systems   HENT: Positive for congestion, ear pain, sinus pressure and sinus pain  Respiratory: Positive for cough            Current Outpatient Medications   Medication Sig Dispense Refill    Aimovig 140 MG/ML SOAJ INJECT 140MG UNDER THE SKIN EVERY 30 DAYS 1 mL 11    atorvastatin (LIPITOR) 20 mg tablet Take 1 tablet (20 mg total) by mouth daily 90 tablet 0    ergocalciferol (VITAMIN D2) 50,000 units Take 1 capsule (50,000 Units total) by mouth once a week for 12 doses 12 capsule 0    escitalopram (LEXAPRO) 20 mg tablet Take 1 tablet (20 mg total) by mouth daily  0    fluticasone (FLONASE) 50 mcg/act nasal spray 2 sprays into each nostril daily 16 g 1    gabapentin (NEURONTIN) 300 mg capsule TAKE ONE CAPSULE BY MOUTH THREE TIMES A DAY 90 capsule 5    ibuprofen (MOTRIN) 600 mg tablet as needed       lamoTRIgine (LAMICTAL) 150 MG tablet Take 1 tablet (150 mg total) by mouth 2 (two) times a day  0    Nerve Stimulator (Cefaly Electrode) MISC Apply once daily prn migraine for 20 minutes  1 each 0    Ubrogepant (UBRELVY) 50 MG tablet 1-2 tabs at migraine onset, repeat in 2 hours if needed  Max 2 per day  Hold DHE  10 tablet 5    verapamil (VERELAN PM) 120 MG 24 hr capsule Take 1 capsule (120 mg total) by mouth daily at bedtime 30 capsule 4    amoxicillin-clavulanate (Augmentin) 875-125 mg per tablet Take 1 tablet by mouth every 12 (twelve) hours for 10 days 20 tablet 0    predniSONE 20 mg tablet 4 tabs for three days, 3 tabs for three days, 2 tabs for three days, 1 tab for three days, 1/2 tab for 4 days 32 tablet 0     No current facility-administered medications for this visit  Objective:    /74   Pulse 93   Temp 99 5 °F (37 5 °C)   Resp 17   Ht 5' 9" (1 753 m)   Wt 88 2 kg (194 lb 6 4 oz)   BMI 28 71 kg/m²        Physical Exam  Vitals and nursing note reviewed  Constitutional:       General: He is not in acute distress  Appearance: He is well-developed  He is not diaphoretic  HENT:      Head: Normocephalic and atraumatic  Right Ear: External ear normal  A middle ear effusion is present  Tympanic membrane is erythematous and bulging  Left Ear: External ear normal  A middle ear effusion is present  Tympanic membrane is erythematous and bulging  Nose: Congestion present  Right Turbinates: Swollen  Left Turbinates: Swollen        Mouth/Throat:      Pharynx: No oropharyngeal exudate  Eyes:      General: No scleral icterus  Right eye: No discharge  Left eye: No discharge  Pupils: Pupils are equal, round, and reactive to light  Neck:      Thyroid: No thyromegaly  Cardiovascular:      Rate and Rhythm: Normal rate  Heart sounds: Normal heart sounds  No murmur heard  Pulmonary:      Effort: Pulmonary effort is normal  No respiratory distress  Breath sounds: Normal breath sounds  No wheezing  Abdominal:      General: Bowel sounds are normal  There is no distension  Palpations: Abdomen is soft  There is no mass  Tenderness: There is no abdominal tenderness  There is no guarding or rebound  Musculoskeletal:         General: Normal range of motion  Skin:     General: Skin is warm and dry  Findings: No erythema or rash  Neurological:      Mental Status: He is alert        Coordination: Coordination normal       Deep Tendon Reflexes: Reflexes normal    Psychiatric:         Behavior: Behavior normal                 Redell Clinton, DO

## 2022-01-14 DIAGNOSIS — R79.89 ABNORMAL LIVER FUNCTION TEST: Primary | ICD-10-CM

## 2022-01-15 LAB
ALBUMIN SERPL-MCNC: 4.3 G/DL (ref 4–5)
ALBUMIN/GLOB SERPL: 2 {RATIO} (ref 1.2–2.2)
ALP SERPL-CCNC: 76 IU/L (ref 44–121)
ALT SERPL-CCNC: 81 IU/L (ref 0–44)
AST SERPL-CCNC: 32 IU/L (ref 0–40)
BILIRUB SERPL-MCNC: 0.6 MG/DL (ref 0–1.2)
BUN SERPL-MCNC: 16 MG/DL (ref 6–24)
BUN/CREAT SERPL: 15 (ref 9–20)
CALCIUM SERPL-MCNC: 9.1 MG/DL (ref 8.7–10.2)
CHLORIDE SERPL-SCNC: 102 MMOL/L (ref 96–106)
CHOLEST SERPL-MCNC: 148 MG/DL (ref 100–199)
CO2 SERPL-SCNC: 21 MMOL/L (ref 20–29)
CREAT SERPL-MCNC: 1.07 MG/DL (ref 0.76–1.27)
GLOBULIN SER-MCNC: 2.1 G/DL (ref 1.5–4.5)
GLUCOSE SERPL-MCNC: 93 MG/DL (ref 65–99)
HCV AB S/CO SERPL IA: <0.1 S/CO RATIO (ref 0–0.9)
HDLC SERPL-MCNC: 37 MG/DL
LDLC SERPL CALC-MCNC: 96 MG/DL (ref 0–99)
MICRODELETION SYND BLD/T FISH: NORMAL
POTASSIUM SERPL-SCNC: 4.1 MMOL/L (ref 3.5–5.2)
PROT SERPL-MCNC: 6.4 G/DL (ref 6–8.5)
PSA SERPL-MCNC: 3 NG/ML (ref 0–4)
SL AMB EGFR AFRICAN AMERICAN: 93 ML/MIN/1.73
SL AMB EGFR NON AFRICAN AMERICAN: 81 ML/MIN/1.73
SODIUM SERPL-SCNC: 139 MMOL/L (ref 134–144)
TRIGL SERPL-MCNC: 77 MG/DL (ref 0–149)

## 2022-01-17 ENCOUNTER — TELEPHONE (OUTPATIENT)
Dept: FAMILY MEDICINE CLINIC | Facility: CLINIC | Age: 52
End: 2022-01-17

## 2022-01-28 ENCOUNTER — HOSPITAL ENCOUNTER (OUTPATIENT)
Dept: RADIOLOGY | Facility: HOSPITAL | Age: 52
Discharge: HOME | End: 2022-01-28
Attending: FAMILY MEDICINE
Payer: COMMERCIAL

## 2022-01-28 DIAGNOSIS — R79.89 ABNORMAL LIVER FUNCTION TEST: ICD-10-CM

## 2022-01-28 PROCEDURE — 76705 ECHO EXAM OF ABDOMEN: CPT

## 2022-02-04 LAB
ALBUMIN SERPL-MCNC: 4.6 G/DL (ref 4–5)
ALBUMIN/GLOB SERPL: 2.2 {RATIO} (ref 1.2–2.2)
ALP SERPL-CCNC: 88 IU/L (ref 44–121)
ALT SERPL-CCNC: 45 IU/L (ref 0–44)
AST SERPL-CCNC: 19 IU/L (ref 0–40)
BILIRUB SERPL-MCNC: 0.6 MG/DL (ref 0–1.2)
BUN SERPL-MCNC: 14 MG/DL (ref 6–24)
BUN/CREAT SERPL: 12 (ref 9–20)
CALCIUM SERPL-MCNC: 9.3 MG/DL (ref 8.7–10.2)
CHLORIDE SERPL-SCNC: 102 MMOL/L (ref 96–106)
CO2 SERPL-SCNC: 22 MMOL/L (ref 20–29)
CREAT SERPL-MCNC: 1.13 MG/DL (ref 0.76–1.27)
GLOBULIN SER-MCNC: 2.1 G/DL (ref 1.5–4.5)
GLUCOSE SERPL-MCNC: 94 MG/DL (ref 65–99)
POTASSIUM SERPL-SCNC: 4.2 MMOL/L (ref 3.5–5.2)
PROT SERPL-MCNC: 6.7 G/DL (ref 6–8.5)
SL AMB EGFR AFRICAN AMERICAN: 87 ML/MIN/1.73
SL AMB EGFR NON AFRICAN AMERICAN: 75 ML/MIN/1.73
SODIUM SERPL-SCNC: 139 MMOL/L (ref 134–144)

## 2022-03-09 ENCOUNTER — OFFICE VISIT (OUTPATIENT)
Dept: FAMILY MEDICINE CLINIC | Facility: CLINIC | Age: 52
End: 2022-03-09
Payer: COMMERCIAL

## 2022-03-09 VITALS
HEIGHT: 69 IN | DIASTOLIC BLOOD PRESSURE: 74 MMHG | BODY MASS INDEX: 28.44 KG/M2 | RESPIRATION RATE: 18 BRPM | HEART RATE: 76 BPM | TEMPERATURE: 97.2 F | WEIGHT: 192 LBS | SYSTOLIC BLOOD PRESSURE: 126 MMHG | OXYGEN SATURATION: 98 %

## 2022-03-09 DIAGNOSIS — F31.32 BIPOLAR AFFECTIVE DISORDER, CURRENTLY DEPRESSED, MODERATE (HCC): ICD-10-CM

## 2022-03-09 DIAGNOSIS — E78.00 HYPERCHOLESTEROLEMIA: ICD-10-CM

## 2022-03-09 DIAGNOSIS — J01.90 ACUTE SINUSITIS, RECURRENCE NOT SPECIFIED, UNSPECIFIED LOCATION: Primary | ICD-10-CM

## 2022-03-09 DIAGNOSIS — G43.719 INTRACTABLE CHRONIC MIGRAINE WITHOUT AURA AND WITHOUT STATUS MIGRAINOSUS: ICD-10-CM

## 2022-03-09 PROCEDURE — 3008F BODY MASS INDEX DOCD: CPT | Performed by: NURSE PRACTITIONER

## 2022-03-09 PROCEDURE — 99214 OFFICE O/P EST MOD 30 MIN: CPT | Performed by: NURSE PRACTITIONER

## 2022-03-09 PROCEDURE — 3725F SCREEN DEPRESSION PERFORMED: CPT | Performed by: NURSE PRACTITIONER

## 2022-03-09 PROCEDURE — 1036F TOBACCO NON-USER: CPT | Performed by: NURSE PRACTITIONER

## 2022-03-09 RX ORDER — AZITHROMYCIN 250 MG/1
TABLET, FILM COATED ORAL
Qty: 6 TABLET | Refills: 0 | Status: SHIPPED | OUTPATIENT
Start: 2022-03-09 | End: 2022-03-14

## 2022-03-09 NOTE — PROGRESS NOTES
Assessment/Plan:    1  Acute sinusitis, recurrence not specified, unspecified location  -     azithromycin (ZITHROMAX) 250 mg tablet; Take 500mg on day 1, 250mg on days 2-5    2  Intractable chronic migraine without aura and without status migrainosus  Comments:  managed by neurologist and stable with current regimen    3  Hypercholesterolemia  Comments:  complaint with statin     4  Bipolar affective disorder, currently depressed, moderate ([address] )  Comments:  complaint with his regimen        Patient Instructions: Take medication with food  It is important that you take the entire course of antibiotics prescribed  May also take a probiotic of your choice to maintain healthy GI ari  Can take some probiotic and yogurt with the medication  Increase fluid intake, saline nasal rinses, and hot tea with honey and lemon  Cool air humidification can be helpful as well  May take Tylenol as needed for pain or fevers  Mucinex D for sinus congestion or Coricidin HBP if you have high blood pressure or a heart condition  Mucinex or Robitussin DM are effective for cough and chest congestion  Supportive care discussed and advised  Advised to RTO for any worsening and no improvement  Follow up for no improvement and worsening of conditions  Patient advised and educated when to see immediate medical care  Return if symptoms worsen or fail to improve  Future Appointments   Date Time Provider Edwardo Leyva   4/12/2022  8:00 AM Wolf Porras PA-C [facility]           Subjective:      Patient ID: Charly Morse is a 47 y o  male      Chief Complaint   Patient presents with    sinus pressure     symptoms started 1 week ago  [id number]    Earache         Vitals:  /74   Pulse 76   Temp (!) 97 2 °F (36 2 °C)   Resp 18   Ht 5' 9" (1 753 m)   Wt 87 1 kg (192 lb)   SpO2 98%   BMI 28 35 kg/m²     HPI  Patient stated that started with sinus pressure and ear pain couple of days ago and getting worse  Denies any fever, chills and sob  Tried OTC but no relief  Denies fever, chills and sob  Complaint with his medications for chronic illnesses and tolerating it well  PHQ-2/9 Depression Screening    Little interest or pleasure in doing things: 0 - not at all  Feeling down, depressed, or hopeless: 0 - not at all  PHQ-2 Score: 0  PHQ-2 Interpretation: Negative depression screen             The following portions of the patient's history were reviewed and updated as appropriate: allergies, current medications, past family history, past medical history, past social history, past surgical history and problem list       Review of Systems   Constitutional: Negative for chills, diaphoresis, fatigue, fever and unexpected weight change  HENT: Positive for ear pain and sinus pressure  Negative for congestion, dental problem, drooling, ear discharge, facial swelling, hearing loss, mouth sores, nosebleeds, postnasal drip, rhinorrhea, sinus pain, sneezing, sore throat, tinnitus, trouble swallowing and voice change  Respiratory: Negative for cough, chest tightness, shortness of breath and wheezing  Cardiovascular: Negative  Gastrointestinal: Negative for abdominal pain, constipation, diarrhea, nausea and vomiting  Musculoskeletal: Negative  Skin: Negative  Neurological: Negative for dizziness, light-headedness and headaches           Objective:    Social History     Tobacco Use   Smoking Status Never Smoker   Smokeless Tobacco Never Used   Tobacco Comment    never smoked       Allergies: No Known Allergies      Current Outpatient Medications   Medication Sig Dispense Refill    Aimovig 140 MG/ML SOAJ INJECT 140MG UNDER THE SKIN EVERY 30 DAYS 1 mL 11    atorvastatin (LIPITOR) 20 mg tablet Take 1 tablet (20 mg total) by mouth daily 90 tablet 0    escitalopram (LEXAPRO) 20 mg tablet Take 1 tablet (20 mg total) by mouth daily  0    gabapentin (NEURONTIN) 300 mg capsule TAKE ONE CAPSULE BY MOUTH THREE TIMES A DAY 90 capsule 5    ibuprofen (MOTRIN) 600 mg tablet as needed       lamoTRIgine (LAMICTAL) 150 MG tablet Take 1 tablet (150 mg total) by mouth 2 (two) times a day  0    Nerve Stimulator (Cefaly Electrode) MISC Apply once daily prn migraine for 20 minutes  1 each 0    Ubrogepant (UBRELVY) 50 MG tablet 1-2 tabs at migraine onset, repeat in 2 hours if needed  Max 2 per day  Hold DHE  10 tablet 5    verapamil (VERELAN PM) 120 MG 24 hr capsule Take 1 capsule (120 mg total) by mouth daily at bedtime 30 capsule 4    azithromycin (ZITHROMAX) 250 mg tablet Take 500mg on day 1, 250mg on days 2-5 6 tablet 0    ergocalciferol (VITAMIN D2) 50,000 units Take 1 capsule (50,000 Units total) by mouth once a week for 12 doses (Patient not taking: Reported on 3/9/2022 ) 12 capsule 0     No current facility-administered medications for this visit  Physical Exam  Vitals reviewed  Constitutional:       Appearance: Normal appearance  He is well-developed  HENT:      Head: Normocephalic  Right Ear: Tympanic membrane, ear canal and external ear normal       Left Ear: Tympanic membrane, ear canal and external ear normal       Nose: Mucosal edema present  Right Sinus: Maxillary sinus tenderness present  No frontal sinus tenderness  Left Sinus: Maxillary sinus tenderness present  No frontal sinus tenderness  Mouth/Throat:      Mouth: No oral lesions  Pharynx: No oropharyngeal exudate or posterior oropharyngeal erythema  Cardiovascular:      Rate and Rhythm: Normal rate and regular rhythm  Heart sounds: Normal heart sounds  Pulmonary:      Effort: Pulmonary effort is normal       Breath sounds: Normal breath sounds  Musculoskeletal:         General: Normal range of motion  Cervical back: Neck supple  Lymphadenopathy:      Cervical:      Right cervical: No superficial or posterior cervical adenopathy  Left cervical: No superficial or posterior cervical adenopathy  Skin:     General: Skin is warm and dry  Neurological:      Mental Status: He is alert and oriented to person, place, and time  Psychiatric:         Behavior: Behavior normal          Thought Content:  Thought content normal          Judgment: Judgment normal                      TERESA Fung

## 2022-04-01 ENCOUNTER — TELEPHONE (OUTPATIENT)
Dept: NEUROLOGY | Facility: CLINIC | Age: 52
End: 2022-04-01

## 2022-04-12 ENCOUNTER — PROCEDURE VISIT (OUTPATIENT)
Dept: NEUROLOGY | Facility: CLINIC | Age: 52
End: 2022-04-12
Payer: COMMERCIAL

## 2022-04-12 VITALS — HEART RATE: 80 BPM | SYSTOLIC BLOOD PRESSURE: 136 MMHG | TEMPERATURE: 97.6 F | DIASTOLIC BLOOD PRESSURE: 73 MMHG

## 2022-04-12 DIAGNOSIS — G43.709 CHRONIC MIGRAINE WITHOUT AURA WITHOUT STATUS MIGRAINOSUS, NOT INTRACTABLE: Primary | ICD-10-CM

## 2022-04-12 PROCEDURE — 64615 CHEMODENERV MUSC MIGRAINE: CPT | Performed by: PHYSICIAN ASSISTANT

## 2022-04-12 NOTE — PROGRESS NOTES
Universal Protocol   Consent: Verbal consent obtained  Written consent obtained    Risks and benefits: risks, benefits and alternatives were discussed  Consent given by: patient  Patient understanding: patient states understanding of the procedure being performed  Patient consent: the patient's understanding of the procedure matches consent given  Procedure consent: procedure consent matches procedure scheduled        Chemodenervation     Date/Time 4/12/2022 7:57 AM     Performed by  Fadumo Cleary PA-C     Authorized by Fadumo Cleary PA-C        Pre-procedure details      Prepped With: Alcohol     Procedure details     Position:  Upright   Botox     Botox Type:  Type A    Brand:  Botox    mL's of Botulinum Toxin:  155    Final Concentration per CC:  100 units    Needle Gauge:  30 G 2 5 inch   Procedures     Botox Procedures: chronic headache      Indications: migraines     Injection Location      Head / Face:  L superior trapezius, R superior trapezius, L superior cervical paraspinal, R superior cervical paraspinal, L , R , procerus, L temporalis, R temporalis, R frontalis, L frontalis, R medial occipitalis and L medial occipitalis    L  injection amount:  5 unit(s)    R  injection amount:  5 unit(s)    L lateral frontalis:  5 unit(s)    R lateral frontalis:  5 unit(s)    L medial frontalis:  5 unit(s)    R medial frontalis:  5 unit(s)    L temporalis injection amount:  20 unit(s)    R temporalis injection amount:  20 unit(s)    Procerus injection amount:  5 unit(s)    L medial occipitalis injection amount:  15 unit(s)    R medial occipitalis injection amount:  15 unit(s)    L superior cervical paraspinal injection amount:  10 unit(s)    R superior cervical paraspinal injection amount:  10 unit(s)    L superior trapezius injection amount:  15 unit(s)    R superior trapezius injection amount:  15 unit(s)   Total Units     Total units used:  155    Total units discarded: 45   Post-procedure details      Chemodenervation:  Chronic migraine    Facial Nerve Location[de-identified]  Bilateral facial nerve    Patient tolerance of procedure: Tolerated well, no immediate complications       Blood pressure 136/73, pulse 80, temperature 97 6 °F (36 4 °C), temperature source Temporal     Increase #tabs Ubrelvy to 16 per month

## 2022-04-13 ENCOUNTER — TELEPHONE (OUTPATIENT)
Dept: NEUROLOGY | Facility: CLINIC | Age: 52
End: 2022-04-13

## 2022-04-13 NOTE — TELEPHONE ENCOUNTER
Submitted PA on [facility]     CaseId:[id number];Status:Approved; Review Type:Prior Auth; Coverage Start Date:03/14/2022; Coverage End Date:04/13/2023;

## 2022-04-18 DIAGNOSIS — E78.00 HYPERCHOLESTEROLEMIA: ICD-10-CM

## 2022-04-19 RX ORDER — ATORVASTATIN CALCIUM 20 MG/1
20 TABLET, FILM COATED ORAL DAILY
Qty: 90 TABLET | Refills: 0 | Status: SHIPPED | OUTPATIENT
Start: 2022-04-19 | End: 2022-08-03 | Stop reason: SDUPTHER

## 2022-06-27 DIAGNOSIS — R05.9 COUGH: ICD-10-CM

## 2022-06-27 RX ORDER — GABAPENTIN 300 MG/1
300 CAPSULE ORAL 3 TIMES DAILY
Qty: 90 CAPSULE | Refills: 5 | Status: SHIPPED | OUTPATIENT
Start: 2022-06-27

## 2022-07-15 ENCOUNTER — PROCEDURE VISIT (OUTPATIENT)
Dept: NEUROLOGY | Facility: CLINIC | Age: 52
End: 2022-07-15
Payer: COMMERCIAL

## 2022-07-15 VITALS — SYSTOLIC BLOOD PRESSURE: 142 MMHG | TEMPERATURE: 97.6 F | HEART RATE: 74 BPM | DIASTOLIC BLOOD PRESSURE: 83 MMHG

## 2022-07-15 DIAGNOSIS — G43.709 CHRONIC MIGRAINE WITHOUT AURA WITHOUT STATUS MIGRAINOSUS, NOT INTRACTABLE: Primary | ICD-10-CM

## 2022-07-15 PROCEDURE — 64615 CHEMODENERV MUSC MIGRAINE: CPT | Performed by: PHYSICIAN ASSISTANT

## 2022-07-15 NOTE — PROGRESS NOTES
Universal Protocol   Consent: Verbal consent obtained  Written consent obtained    Risks and benefits: risks, benefits and alternatives were discussed  Consent given by: patient  Patient understanding: patient states understanding of the procedure being performed  Patient consent: the patient's understanding of the procedure matches consent given  Procedure consent: procedure consent matches procedure scheduled        Chemodenervation     Date/Time 7/15/2022 9:34 AM     Performed by  Burak Melendrez PA-C     Authorized by Burak Melendrez PA-C        Pre-procedure details      Prepped With: Alcohol     Procedure details     Position:  Upright   Botox     Botox Type:  Type A    Brand:  Botox    mL's of Botulinum Toxin:  155    Final Concentration per CC:  100 units    Needle Gauge:  30 G 2 5 inch   Procedures     Botox Procedures: chronic headache      Indications: migraines     Injection Location      Head / Face:  L superior trapezius, R superior trapezius, L superior cervical paraspinal, R superior cervical paraspinal, L , R , procerus, L temporalis, R temporalis, R frontalis, L frontalis, R medial occipitalis and L medial occipitalis    L  injection amount:  5 unit(s)    R  injection amount:  5 unit(s)    L lateral frontalis:  5 unit(s)    R lateral frontalis:  5 unit(s)    L medial frontalis:  5 unit(s)    R medial frontalis:  5 unit(s)    L temporalis injection amount:  20 unit(s)    R temporalis injection amount:  20 unit(s)    Procerus injection amount:  5 unit(s)    L medial occipitalis injection amount:  15 unit(s)    R medial occipitalis injection amount:  15 unit(s)    L superior cervical paraspinal injection amount:  10 unit(s)    R superior cervical paraspinal injection amount:  10 unit(s)    L superior trapezius injection amount:  15 unit(s)    R superior trapezius injection amount:  15 unit(s)   Total Units     Total units used:  155    Total units discarded: 45   Post-procedure details      Chemodenervation:  Chronic migraine    Facial Nerve Location[de-identified]  Bilateral facial nerve    Patient tolerance of procedure: Tolerated well, no immediate complications       Blood pressure 142/83, pulse 74, temperature 97 6 °F (36 4 °C)  Addendum 7/17/22: instead of 2- 100 unit vials, there were 2- 200 units vials which were each mixed with 1 4 mL sterile normal saline  Therefore the patient was injected with about 1 5x the concentration he is used to receiving, by our error  He tolerated the injection well; he was called later that day to let him know about this error and to potentially expect a little more soreness at injection sights, dry eye and possible mild ptosis  After day 2 pt did not report any AE, however he will keep in touch with any new sxs/ s/e

## 2022-08-03 DIAGNOSIS — E78.00 HYPERCHOLESTEROLEMIA: ICD-10-CM

## 2022-08-03 RX ORDER — ATORVASTATIN CALCIUM 20 MG/1
20 TABLET, FILM COATED ORAL DAILY
Qty: 90 TABLET | Refills: 3 | Status: SHIPPED | OUTPATIENT
Start: 2022-08-03

## 2022-08-11 ENCOUNTER — OFFICE VISIT (OUTPATIENT)
Dept: PULMONOLOGY | Facility: MEDICAL CENTER | Age: 52
End: 2022-08-11
Payer: COMMERCIAL

## 2022-08-11 VITALS
SYSTOLIC BLOOD PRESSURE: 106 MMHG | TEMPERATURE: 97.8 F | WEIGHT: 192 LBS | BODY MASS INDEX: 28.44 KG/M2 | HEART RATE: 77 BPM | DIASTOLIC BLOOD PRESSURE: 70 MMHG | RESPIRATION RATE: 12 BRPM | HEIGHT: 69 IN | OXYGEN SATURATION: 96 %

## 2022-08-11 DIAGNOSIS — R05.3 CHRONIC COUGH: Primary | ICD-10-CM

## 2022-08-11 PROCEDURE — 99215 OFFICE O/P EST HI 40 MIN: CPT | Performed by: INTERNAL MEDICINE

## 2022-08-11 PROCEDURE — 94010 BREATHING CAPACITY TEST: CPT | Performed by: INTERNAL MEDICINE

## 2022-08-11 NOTE — PROGRESS NOTES
Assessment/Plan:    Chronic cough  Film and I reviewed all of his old testing and treatment for chronic cough  For now he will continue using the gabapentin 3 times a day  I did not uncover a spirometry in 2016 which showed obstructive lung disease so I would like to trial him on Stiolto 2 puffs once daily even though today's spirometry came back normal   Perhaps the anticholinergic will help with this cough  He will try taking it for 1 month and then take the gabapentin down to twice a day and see if he can tolerate deescalating that therapy  In addition I have checked panels for environmental and food allergies  Diagnoses and all orders for this visit:    Chronic cough  -     POCT spirometry  -     Food Allergy Profile; Future  -     [facility] Allergy Panel, Adult; Future          Subjective:      Patient ID: Julia Jones is a 47 y o  male  Bernlatisha Romerok here for follow-up of his chronic cough  He denies any shortness of breath or wheeze chest pain leg swelling or acid reflux  He complains of cough but is remarkably better since maintaining on gabapentin 3 times a day  The following portions of the patient's history were reviewed and updated as appropriate: allergies, current medications, past family history, past medical history, past social history, past surgical history and problem list     Review of Systems   Constitutional: Negative  HENT: Negative  Eyes: Negative  Respiratory: Positive for cough  Negative for shortness of breath  Cardiovascular: Negative  Gastrointestinal: Negative  Endocrine: Negative  Genitourinary: Negative  Allergic/Immunologic: Negative  Neurological: Negative  Hematological: Negative  Psychiatric/Behavioral: Negative            Objective:      /70 (BP Location: Left arm, Patient Position: Sitting, Cuff Size: Standard)   Pulse 77   Temp 97 8 °F (36 6 °C) (Temporal)   Resp 12   Ht 5' 9" (1 753 m)   Wt 87 1 kg (192 lb)   SpO2 96% BMI 28 35 kg/m²          Physical Exam  Constitutional:       Appearance: He is well-developed  HENT:      Head: Normocephalic  Eyes:      Pupils: Pupils are equal, round, and reactive to light  Cardiovascular:      Rate and Rhythm: Normal rate  Pulmonary:      Effort: Pulmonary effort is normal  No respiratory distress  Breath sounds: No wheezing or rales  Abdominal:      Palpations: Abdomen is soft  Musculoskeletal:         General: Normal range of motion  Cervical back: Neck supple  Skin:     General: Skin is warm and dry  Neurological:      Mental Status: He is alert and oriented to person, place, and time

## 2022-08-11 NOTE — ASSESSMENT & PLAN NOTE
Film and I reviewed all of his old testing and treatment for chronic cough  For now he will continue using the gabapentin 3 times a day  I did not uncover a spirometry in 2016 which showed obstructive lung disease so I would like to trial him on Stiolto 2 puffs once daily even though today's spirometry came back normal   Perhaps the anticholinergic will help with this cough  He will try taking it for 1 month and then take the gabapentin down to twice a day and see if he can tolerate deescalating that therapy  In addition I have checked panels for environmental and food allergies

## 2022-08-18 ENCOUNTER — OFFICE VISIT (OUTPATIENT)
Dept: FAMILY MEDICINE CLINIC | Facility: CLINIC | Age: 52
End: 2022-08-18
Payer: COMMERCIAL

## 2022-08-18 VITALS
RESPIRATION RATE: 18 BRPM | SYSTOLIC BLOOD PRESSURE: 122 MMHG | HEART RATE: 80 BPM | OXYGEN SATURATION: 98 % | HEIGHT: 69 IN | WEIGHT: 178 LBS | TEMPERATURE: 97.8 F | BODY MASS INDEX: 26.36 KG/M2 | DIASTOLIC BLOOD PRESSURE: 70 MMHG

## 2022-08-18 DIAGNOSIS — Z12.5 SCREENING FOR PROSTATE CANCER: ICD-10-CM

## 2022-08-18 DIAGNOSIS — E78.00 HYPERCHOLESTEROLEMIA: ICD-10-CM

## 2022-08-18 DIAGNOSIS — Z13.6 SCREENING FOR CARDIOVASCULAR CONDITION: ICD-10-CM

## 2022-08-18 DIAGNOSIS — Z12.11 SCREEN FOR COLON CANCER: ICD-10-CM

## 2022-08-18 DIAGNOSIS — Z00.00 WELL ADULT EXAM: Primary | ICD-10-CM

## 2022-08-18 PROBLEM — R09.82 POSTNASAL DRIP: Status: RESOLVED | Noted: 2019-07-29 | Resolved: 2022-08-18

## 2022-08-18 PROCEDURE — 3725F SCREEN DEPRESSION PERFORMED: CPT | Performed by: FAMILY MEDICINE

## 2022-08-18 PROCEDURE — 99396 PREV VISIT EST AGE 40-64: CPT | Performed by: FAMILY MEDICINE

## 2022-08-18 NOTE — PATIENT INSTRUCTIONS
Weight Management   AMBULATORY CARE:   Why it is important to manage your weight:  Being overweight increases your risk of health conditions such as heart disease, high blood pressure, type 2 diabetes, and certain types of cancer  It can also increase your risk for osteoarthritis, sleep apnea, and other respiratory problems  Aim for a slow, steady weight loss  Even a small amount of weight loss can lower your risk of health problems  Risks of being overweight:  Extra weight can cause many health problems, including the following:  · Diabetes (high blood sugar level)    · High blood pressure or high cholesterol    · Heart disease    · Stroke    · Gallbladder or liver disease    · Cancer of the colon, breast, prostate, liver, or kidney    · Sleep apnea    · Arthritis or gout    Screening  is done to check for health conditions before you have signs or symptoms  If you are 28to 79years old, your blood sugar level may be checked every 3 years for signs of prediabetes or diabetes  Your healthcare provider will check your blood pressure at each visit  High blood pressure can lead to a stroke or other problems  Your provider may check for signs of heart disease, cancer, or other health problems  How to lose weight safely:  A safe and healthy way to lose weight is to eat fewer calories and get regular exercise  · You can lose up about 1 pound a week by decreasing the number of calories you eat by 500 calories each day  You can decrease calories by eating smaller portion sizes or by cutting out high-calorie foods  Read labels to find out how many calories are in the foods you eat  · You can also burn calories with exercise such as walking, swimming, or biking  You will be more likely to keep weight off if you make these changes part of your lifestyle  Exercise at least 30 minutes per day on most days of the week   You can also fit in more physical activity by taking the stairs instead of the elevator or parking farther away from stores  Ask your healthcare provider about the best exercise plan for you  Healthy meal plan for weight management:  A healthy meal plan includes a variety of foods, contains fewer calories, and helps you stay healthy  A healthy meal plan includes the following:     · Eat whole-grain foods more often  A healthy meal plan should contain fiber  Fiber is the part of grains, fruits, and vegetables that is not broken down by your body  Whole-grain foods are healthy and provide extra fiber in your diet  Some examples of whole-grain foods are whole-wheat breads and pastas, oatmeal, brown rice, and bulgur  · Eat a variety of vegetables every day  Include dark, leafy greens such as spinach, kale, nayeli greens, and mustard greens  Eat yellow and orange vegetables such as carrots, sweet potatoes, and winter squash  · Eat a variety of fruits every day  Choose fresh or canned fruit (canned in its own juice or light syrup) instead of juice  Fruit juice has very little or no fiber  · Eat low-fat dairy foods  Drink fat-free (skim) milk or 1% milk  Eat fat-free yogurt and low-fat cottage cheese  Try low-fat cheeses such as mozzarella and other reduced-fat cheeses  · Choose meat and other protein foods that are low in fat  Choose beans or other legumes such as split peas or lentils  Choose fish, skinless poultry (chicken or turkey), or lean cuts of red meat (beef or pork)  Before you cook meat or poultry, cut off any visible fat  · Use less fat and oil  Try baking foods instead of frying them  Add less fat, such as margarine, sour cream, regular salad dressing and mayonnaise to foods  Eat fewer high-fat foods  Some examples of high-fat foods include french fries, doughnuts, ice cream, and cakes  · Eat fewer sweets  Limit foods and drinks that are high in sugar  This includes candy, cookies, regular soda, and sweetened drinks  Ways to decrease calories:   · Eat smaller portions  ? Use a small plate with smaller servings  ? Do not eat second helpings  ? When you eat at a restaurant, ask for a box and place half of your meal in the box before you eat  ? Share an entrée with someone else  · Replace high-calorie snacks with healthy, low-calorie snacks  ? Choose fresh fruit, vegetables, fat-free rice cakes, or air-popped popcorn instead of potato chips, nuts, or chocolate  ? Choose water or calorie-free drinks instead of soda or sweetened drinks  · Do not shop for groceries when you are hungry  You may be more likely to make unhealthy food choices  Take a grocery list of healthy foods and shop after you have eaten  · Eat regular meals  Do not skip meals  Skipping meals can lead to overeating later in the day  This can make it harder for you to lose weight  Eat a healthy snack in place of a meal if you do not have time to eat a regular meal  Talk with a dietitian to help you create a meal plan and schedule that is right for you  Other things to consider as you try to lose weight:   · Be aware of situations that may give you the urge to overeat, such as eating while watching television  Find ways to avoid these situations  For example, read a book, go for a walk, or do crafts  · Meet with a weight loss support group or friends who are also trying to lose weight  This may help you stay motivated to continue working on your weight loss goals  © Copyright [employer] 2022 Information is for End User's use only and may not be sold, redistributed or otherwise used for commercial purposes  All illustrations and images included in CareNotes® are the copyrighted property of A [employer] M [employer] Inc  or [facility] Adelaida Jeffrey   The above information is an  only  It is not intended as medical advice for individual conditions or treatments  Talk to your doctor, nurse or pharmacist before following any medical regimen to see if it is safe and effective for you

## 2022-08-18 NOTE — PROGRESS NOTES
FAMILY PRACTICE HEALTH MAINTENANCE OFFICE VISIT  [facility]    NAME: Mary Zimmer  AGE: 47 y o  SEX: male  : 1970     DATE: 2022    Assessment and Plan     1  Well adult exam    2  Hypercholesterolemia    3  Screening for cardiovascular condition  -     Comprehensive metabolic panel; Future; Expected date: 2023  -     Lipid Panel with Direct LDL reflex; Future; Expected date: 2023    4  Screening for prostate cancer  -     PSA, Total Screen; Future    5  BMI 26 0-26 9,adult    6  Screen for colon cancer      Patient Counseling:   Nutrition: Stressed importance of a well balanced diet, moderation of sodium/saturated fat, caloric balance and sufficient intake of fiber  Exercise: Stressed the importance of regular exercise with a goal of 150 minutes per week  Dental Health: Discussed daily flossing and brushing and regular dental visits     Immunizations reviewed: Up To Date  Discussed benefits of:  Colon Cancer Screening, Prostate Cancer Screening  and Screening labs  BMI Counseling: Body mass index is 26 29 kg/m²  Discussed with patient's BMI with him  The BMI is above normal  Nutrition recommendations include reducing portion sizes  Return in about 6 months (around 2023)          Chief Complaint     Chief Complaint   Patient presents with    Physical Exam     [id number]       History of Present Illness     Pt is sched for a full physical  Pt had a colon last year [facility]      Well Adult Physical   Patient here for a comprehensive physical exam       Diet and Physical Activity  Diet: well balanced diet  Exercise: frequently      Depression Screen  PHQ-2/9 Depression Screening    Little interest or pleasure in doing things: 0 - not at all  Feeling down, depressed, or hopeless: 0 - not at all  PHQ-2 Score: 0  PHQ-2 Interpretation: Negative depression screen          General Health  Hearing: Normal:  bilateral  Vision: wears contacts  Dental: regular dental visits    Reproductive Health  No issues       The following portions of the patient's history were reviewed and updated as appropriate: allergies, current medications, past family history, past medical history, past social history, past surgical history and problem list     Review of Systems     Review of Systems   Constitutional: Negative for activity change, appetite change, chills, diaphoresis, fatigue, fever and unexpected weight change  HENT: Negative for congestion, dental problem, ear pain, mouth sores, sinus pressure, sinus pain, sore throat and trouble swallowing  Eyes: Negative for photophobia, discharge and itching  Respiratory: Negative for apnea, chest tightness and shortness of breath  Cardiovascular: Negative for chest pain, palpitations and leg swelling  Gastrointestinal: Negative for abdominal distention, abdominal pain, blood in stool, nausea and vomiting  Endocrine: Negative for cold intolerance, heat intolerance, polydipsia, polyphagia and polyuria  Genitourinary: Negative for difficulty urinating  Musculoskeletal: Negative for arthralgias  Skin: Negative for color change and wound  Neurological: Negative for dizziness, syncope, speech difficulty and headaches  Hematological: Negative for adenopathy  Psychiatric/Behavioral: Negative for agitation and behavioral problems         Past Medical History     Past Medical History:   Diagnosis Date    Chronic migraine without aura without status migrainosus, not intractable 8/6/2021    Cubital tunnel syndrome     last assessed 1/6/14    Depression 11/07/2005    Hyperlipidemia     Hypertension     Migraine     Psychiatric disorder        Past Surgical History     Past Surgical History:   Procedure Laterality Date    ELBOW ARTHROPLASTY Bilateral     right elbow reconstruction surgery and transposition surgery of the left elbow    KNEE ARTHROCENTESIS      SHOULDER ARTHROPLASTY      SHOULDER SURGERY  12/06/2018    AC Joint- Left    SINUS SURGERY      with bone spur removed in uvulectomy done  Had [facility] at the time, Dr Uriel Smith ENT    TONSILLECTOMY      tonsils removed and also removal of uvula, then a few years ago by Dr Uriel Smith  At that time he was having frequent sinus and throat infections, last assessed 12/19/16       Social History     Social History     Socioeconomic History    Marital status:      Spouse name: None    Number of children: None    Years of education: None    Highest education level: None   Occupational History    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 [employer]   Tobacco Use    Smoking status: Never Smoker    Smokeless tobacco: Never Used    Tobacco comment: never smoked   Vaping Use    Vaping Use: Never used   Substance and Sexual Activity    Alcohol use: Not Currently     Comment: rare    Drug use: No     Comment: social per [employer]    Sexual activity: Yes     Partners: Female   Other Topics Concern    None   Social History Narrative    Daily tea consumption     per [employer]     Social Determinants of Health     Financial Resource Strain: Not on file   Food Insecurity: Not on file   Transportation Needs: Not on file   Physical Activity: Not on file   Stress: Not on file   Social Connections: Not on file   Intimate Partner Violence: Not on file   Housing Stability: Not on file       Family History     Family History   Problem Relation Age of Onset    Migraines Mother     Depression Mother     Alzheimer's disease Mother     No Known Problems Father     Arthritis Family     Breast cancer Family     Heart disease Family         cardiac disorder    Hypertension Family     Breast cancer Sister     Asperger's syndrome Son        Current Medications       Current Outpatient Medications:     Aimovig 140 MG/ML SOAJ, INJECT 140MG UNDER THE SKIN EVERY 30 DAYS, Disp: 1 mL, Rfl: 11    atorvastatin (LIPITOR) 20 mg tablet, Take 1 tablet (20 mg total) by mouth daily, Disp: 90 tablet, Rfl: 3    gabapentin (NEURONTIN) 300 mg capsule, Take 1 capsule (300 mg total) by mouth 3 (three) times a day, Disp: 90 capsule, Rfl: 5    ibuprofen (MOTRIN) 600 mg tablet, as needed , Disp: , Rfl:     lamoTRIgine (LAMICTAL) 150 MG tablet, Take 1 tablet (150 mg total) by mouth 2 (two) times a day, Disp: , Rfl: 0    Nerve Stimulator (Cefaly Electrode) MISC, Apply once daily prn migraine for 20 minutes  , Disp: 1 each, Rfl: 0    Ubrogepant (UBRELVY) 50 MG tablet, 1-2 tabs at migraine onset, repeat in 2 hours if needed  Max 2 per day  Hold DHE , Disp: 16 tablet, Rfl: 5    verapamil (VERELAN PM) 120 MG 24 hr capsule, Take 1 capsule (120 mg total) by mouth daily at bedtime, Disp: 30 capsule, Rfl: 4    ergocalciferol (VITAMIN D2) 50,000 units, Take 1 capsule (50,000 Units total) by mouth once a week for 12 doses (Patient not taking: Reported on 3/9/2022 ), Disp: 12 capsule, Rfl: 0    Current Facility-Administered Medications:     Botulinum Toxin Type A SOLR 200 Units, 200 Units, Injection, Q3 Months, Belgica Brooke PA-C, 200 Units at 07/15/22 7331     Allergies     No Known Allergies    Objective     /70   Pulse 80   Temp 97 8 °F (36 6 °C)   Resp 18   Ht 5' 9" (1 753 m)   Wt 80 7 kg (178 lb)   SpO2 98%   BMI 26 29 kg/m²      Physical Exam  Vitals and nursing note reviewed  Constitutional:       General: He is not in acute distress  Appearance: He is well-developed  He is not diaphoretic  HENT:      Head: Normocephalic and atraumatic  Right Ear: External ear normal       Left Ear: External ear normal       Nose: Nose normal       Mouth/Throat:      Pharynx: No oropharyngeal exudate  Eyes:      General: No scleral icterus  Right eye: No discharge  Left eye: No discharge  Pupils: Pupils are equal, round, and reactive to light  Neck:      Thyroid: No thyromegaly  Cardiovascular:      Rate and Rhythm: Normal rate        Heart sounds: Normal heart sounds  No murmur heard  Pulmonary:      Effort: Pulmonary effort is normal  No respiratory distress  Breath sounds: Normal breath sounds  No wheezing  Abdominal:      General: Bowel sounds are normal  There is no distension  Palpations: Abdomen is soft  There is no mass  Tenderness: There is no abdominal tenderness  There is no guarding or rebound  Genitourinary:     Prostate: Normal    Musculoskeletal:         General: Normal range of motion  Skin:     General: Skin is warm and dry  Findings: No erythema or rash  Neurological:      Mental Status: He is alert  Coordination: Coordination normal       Deep Tendon Reflexes: Reflexes normal    Psychiatric:         Behavior: Behavior normal             Visual Acuity Screening    Right eye Left eye Both eyes   Without correction: 20/20 20/20 20/20   With correction:              Tigre Thomas DO  [address]  BMI Counseling: Body mass index is 26 29 kg/m²  The BMI is above normal  Nutrition recommendations include decreasing overall calorie intake

## 2022-08-19 ENCOUNTER — TELEPHONE (OUTPATIENT)
Dept: ADMINISTRATIVE | Facility: OTHER | Age: 52
End: 2022-08-19

## 2022-08-19 NOTE — TELEPHONE ENCOUNTER
Upon review of the In Basket request and the patient's chart, initial outreach has been made via telephone call, please see Contacts section for details       Thank you  Moises Diaz MA

## 2022-08-19 NOTE — TELEPHONE ENCOUNTER
Upon review of the In Basket request we have found as a result of outreach that patient did not have the requested item(s) completed  /No records at practice  Internet search for Dr Chris Sanchez returns results for [facility]  Any additional questions or concerns should be emailed to the Practice Liaisons via [email or web address] email, please do not reply via In Basket      Thank you  Celia Vazquez MA

## 2022-08-19 NOTE — TELEPHONE ENCOUNTER
----- Message from Lanye Lobo DO sent at 8/18/2022  3:16 PM EDT -----  Regarding: colon  Pt had his colon within the last two years - DR Celaya Mins - possible with Annmarie Courser

## 2022-09-09 LAB
A ALTERNATA IGE QN: <0.1 KU/L
A FUMIGATUS IGE QN: <0.1 KU/L
BERMUDA GRASS IGE QN: <0.1 KU/L
BOXELDER IGE QN: <0.1 KU/L
C HERBARUM IGE QN: <0.1 KU/L
CAT DANDER IGE QN: <0.1 KU/L
CMN PIGWEED IGE QN: <0.1 KU/L
CODFISH IGE QN: <0.1 KU/L
COMMON RAGWEED IGE QN: <0.1 KU/L
COTTONWOOD IGE QN: NORMAL KU/L
COW MILK IGE QN: <0.1 KU/L
D FARINAE IGE QN: <0.1 KU/L
D PTERONYSS IGE QN: <0.1 KU/L
DOG DANDER IGE QN: <0.1 KU/L
EGG WHITE IGE QN: <0.1 KU/L
IGE SERPL-ACNC: 7 IU/ML (ref 6–495)
LONDON PLANE IGE QN: NORMAL KU/L
Lab: NORMAL
MOUSE URINE PROT IGE QN: <0.1 KU/L
MT JUNIPER IGE QN: <0.1 KU/L
MUGWORT IGE QN: NORMAL KU/L
PEANUT (RARA H) 6 IGE QN: <0.1 KU/L
PENICILLIUM CHRYSOGENUM: NORMAL KU/L
ROACH IGE QN: <0.1 KU/L
SHEEP SORREL IGE QN: NORMAL KU/L
SILVER BIRCH IGE QN: NORMAL KU/L
SOYBEAN IGE QN: <0.1 KU/L
TIMOTHY IGE QN: <0.1 KU/L
WALNUT IGE: <0.1 KU/L
WHEAT IGE QN: <0.1 KU/L
WHITE ASH IGE QN: NORMAL KU/L
WHITE ELM IGE QN: <0.1 KU/L
WHITE MULBERRY IGE QN: NORMAL KU/L
WHITE OAK IGE QN: <0.1 KU/L

## 2022-10-11 ENCOUNTER — TELEPHONE (OUTPATIENT)
Dept: NEUROLOGY | Facility: CLINIC | Age: 52
End: 2022-10-11

## 2022-10-18 ENCOUNTER — PROCEDURE VISIT (OUTPATIENT)
Dept: NEUROLOGY | Facility: CLINIC | Age: 52
End: 2022-10-18
Payer: COMMERCIAL

## 2022-10-18 VITALS
HEIGHT: 69 IN | TEMPERATURE: 97.1 F | HEART RATE: 88 BPM | SYSTOLIC BLOOD PRESSURE: 119 MMHG | BODY MASS INDEX: 27.85 KG/M2 | DIASTOLIC BLOOD PRESSURE: 76 MMHG | WEIGHT: 188 LBS | OXYGEN SATURATION: 98 %

## 2022-10-18 DIAGNOSIS — G43.719 INTRACTABLE CHRONIC MIGRAINE WITHOUT AURA AND WITHOUT STATUS MIGRAINOSUS: Primary | ICD-10-CM

## 2022-10-18 PROCEDURE — 64615 CHEMODENERV MUSC MIGRAINE: CPT | Performed by: PSYCHIATRY & NEUROLOGY

## 2022-10-18 NOTE — PROGRESS NOTES
Universal Protocol   Consent: Written consent obtained  Consent given by: patient  Time out: Immediately prior to procedure a "time out" was called to verify the correct patient, procedure, equipment, support staff and site/side marked as required  Timeout called at: 10/18/2022 10:06 AM       Chemodenervation     Date/Time 10/18/2022 10:06 AM     Performed by  Elidia Wang MD     Authorized by Elidia Wang MD        Pre-procedure details      Prepped With: Alcohol     Anesthesia  (see MAR for exact dosages): Anesthesia method:  None   Procedure details     Position:  Upright   Botox     Botox Type:  Type A    Brand:  Botox    Final Concentration per CC:  50 units    Medication Administration:  200 Units Botulinum Toxin Type A 200 units   Procedures     Botox Procedures: chronic headache      Indications: migraines     Post-procedure details      Chemodenervation:  Chronic migraine    Facial Nerve Location[de-identified]  Bilateral facial nerve    Patient tolerance of procedure: Tolerated well, no immediate complications         10 units, 2 sites unit(s) was injected into the procerus muscle     5 unit(s) was injected into the  right  muscle     5 unit(s) was injected into the  left  muscle     10 unit(s) was injected into the  right frontalis muscle     10 unit(s) was injected into the  left frontalis muscle     40 unit(s) was injected into the  right temporalis muscle     40 u, 7 sites unit(s) was injected into the  left temporalis muscle     15 unit(s) was injected into the  right occipitalis muscle     15 unit(s) was injected into the  left occipitalis muscle     10 unit(s) was injected into the  right cervical paraspinal muscle     10 unit(s) was injected into the  left cervical paraspinal muscle     15 unit(s) was injected into the  right trapezius muscle     15 unit(s) was injected into the  left trapezius muscle        A total of 200units were used  A total of 0units were discarded  Diagnosis ICD-10-CM Associated Orders   1   Intractable chronic migraine without aura and without status migrainosus  G43 719 Botulinum Toxin Type A SOLR 200 Units     Chemodenervation

## 2022-11-08 DIAGNOSIS — E78.00 HYPERCHOLESTEROLEMIA: ICD-10-CM

## 2022-11-08 RX ORDER — ATORVASTATIN CALCIUM 20 MG/1
20 TABLET, FILM COATED ORAL DAILY
Qty: 90 TABLET | Refills: 1 | Status: SHIPPED | OUTPATIENT
Start: 2022-11-08

## 2022-11-16 ENCOUNTER — TELEPHONE (OUTPATIENT)
Dept: NEUROLOGY | Facility: CLINIC | Age: 52
End: 2022-11-16

## 2022-11-16 NOTE — TELEPHONE ENCOUNTER
Patient current prior-authorization currently on file is set to  2022 before patients appointment 2023  Writer contacted plan ([employer]iffani) for this information to initiate new auth, spoke to Epi  C (ref#[id number]) and she advised that the CPT code 54316 does not require prior authorization but does require predetermination  When asked if patient has a current pharmacy benefit she stated patients Botox is covered at 90% with a 10% out of pocket expense through patients medical plan and offered the pharmacy associated with the plan is [facility], but offered if I wanted it to be buy and bill or transferred to [facility] and benefits to check plan  Transferred to that department, spoke to [address] [place] [address] (ref# [id number]) and she too offered that the patients plan is covered at 90% and 10% out of pocket expense, she too mentioned that the patients pharmacy associated with plan is [facility] and [facility]  Writer checked for coverage via navinet to [facility] and it returned with message;drug is covered by current plan  No further PA activity needed  Brian Soto in the pre-certification department and initiated a predetermination for patient  She advised that this plan is Buy and bill and will need clinicals [facility]ed [facility] [id number] with valid dates of   Will continue to follow with patient plan for approval/denial of determination

## 2022-12-22 DIAGNOSIS — G43.709 CHRONIC MIGRAINE WITHOUT AURA WITHOUT STATUS MIGRAINOSUS, NOT INTRACTABLE: ICD-10-CM

## 2022-12-22 RX ORDER — ERENUMAB-AOOE 140 MG/ML
INJECTION, SOLUTION SUBCUTANEOUS
Qty: 1 ML | Refills: 11 | Status: SHIPPED | OUTPATIENT
Start: 2022-12-22

## 2022-12-27 ENCOUNTER — OFFICE VISIT (OUTPATIENT)
Dept: PULMONOLOGY | Facility: MEDICAL CENTER | Age: 52
End: 2022-12-27

## 2022-12-27 VITALS
OXYGEN SATURATION: 98 % | HEART RATE: 90 BPM | DIASTOLIC BLOOD PRESSURE: 82 MMHG | SYSTOLIC BLOOD PRESSURE: 118 MMHG | HEIGHT: 69 IN | BODY MASS INDEX: 27.28 KG/M2 | RESPIRATION RATE: 12 BRPM | WEIGHT: 184.2 LBS

## 2022-12-27 DIAGNOSIS — R05.3 CHRONIC COUGH: Primary | ICD-10-CM

## 2022-12-27 NOTE — ASSESSMENT & PLAN NOTE
Given there was no improvement with Stiolto, discussed the role of any additional inhaler therapy  We will monitor off for now  He will continue with gabapentin and has had no significant side effects from this  He will follow-up in 6 months

## 2022-12-27 NOTE — PROGRESS NOTES
Pulmonary Follow-Up Note   Smitha Arce 47 y o  male MRN: [id number]  12/27/2022      Assessment/Plan:    Problem List Items Addressed This Visit        Other    Chronic cough - Primary     Given there was no improvement with Stiolto, discussed the role of any additional inhaler therapy  We will monitor off for now  He will continue with gabapentin and has had no significant side effects from this  He will follow-up in 6 months  Education provided at this visit:   Need for Vaccination: Declines further COVID vaccines after having 1  Refuses flu and pneumonia vaccines  Smoking Cessation: Never smoker    Return in about 6 months (around 6/27/2023), or if symptoms worsen or fail to improve  All of Tha's questions were answered prior to leaving the office today  He will follow-up with Dr Christine Cabral in six months or sooner should the need arise  He is aware to call our office with any further questions or concerns  History of Present Illness   Reason for Visit: Follow-up  Chief Complaint: "I still have a cough "  HPI: Smitha Arce is a 47 y o  male who presents to the office today for follow-up of chronic cough  He reports that he has had a cough for many years  It is dry in nature and without sputum production  He does not have any significant postnasal drip  He did have some acid reflux and underwent endoscopy by gastroenterology  He trialed on medications for acid reflux with no improvement, so they were stopped  He does not have significant acid reflux symptoms recently per his report  He has been on gabapentin for approximately 5 years and initially found significant improvement in his cough  He still has residual occasional cough  He was trialed on Stiolto inhaler at his last visit and took it consistently for 3 months without improvement  He stopped it  No change in cough with stopping the inhaler  He has not had any other inhalers  He denies fevers, chills, or chest pain    He denies any other complaints  Review of Systems   All other systems reviewed and are negative  A full 12-point review of systems was completed and is negative except for those outlined in the HPI  Historical Information   Past Medical History:   Diagnosis Date   • Chronic migraine without aura without status migrainosus, not intractable 8/6/2021   • Cubital tunnel syndrome     last assessed 1/6/14   • Depression 11/07/2005   • Hyperlipidemia    • Hypertension    • Migraine    • Psychiatric disorder      Past Surgical History:   Procedure Laterality Date   • ELBOW ARTHROPLASTY Bilateral     right elbow reconstruction surgery and transposition surgery of the left elbow   • KNEE ARTHROCENTESIS     • SHOULDER ARTHROPLASTY     • SHOULDER SURGERY  12/06/2018    AC Joint- Left   • SINUS SURGERY      with bone spur removed in uvulectomy done  Had y[facility]anda [facility] at the time, Dr Luis Singh ENT   • TONSILLECTOMY      tonsils removed and also removal of uvula, then a few years ago by Dr Luis Singh  At that time he was having frequent sinus and throat infections, last assessed 12/19/16     Family History   Problem Relation Age of Onset   • Migraines Mother    • Depression Mother    • Alzheimer's disease Mother    • No Known Problems Father    • Arthritis Family    • Breast cancer Family    • Heart disease Family         cardiac disorder   • Hypertension Family    • Breast cancer Sister    • Asperger's syndrome Son      Social History   Social History     Substance and Sexual Activity   Alcohol Use Not Currently    Comment: rare     Social History     Substance and Sexual Activity   Drug Use No    Comment: social per [employer]     Social History     Tobacco Use   Smoking Status Never   Smokeless Tobacco Never   Tobacco Comments    never smoked     E-Cigarette/Vaping   • E-Cigarette Use Never User      E-Cigarette/Vaping Substances   • Nicotine No    • THC No    • CBD No    • Flavoring No    • Other No    • Unknown No Meds/Allergies     Current Outpatient Medications:   •  Aimovig 140 MG/ML SOAJ, INJECT 140 MG UNDER THE SKIN EVERY 30 DAYS, Disp: 1 mL, Rfl: 11  •  atorvastatin (LIPITOR) 20 mg tablet, Take 1 tablet (20 mg total) by mouth daily, Disp: 90 tablet, Rfl: 1  •  gabapentin (NEURONTIN) 300 mg capsule, Take 1 capsule (300 mg total) by mouth 3 (three) times a day, Disp: 90 capsule, Rfl: 5  •  ibuprofen (MOTRIN) 600 mg tablet, as needed , Disp: , Rfl:   •  lamoTRIgine (LAMICTAL) 150 MG tablet, Take 1 tablet (150 mg total) by mouth 2 (two) times a day, Disp: , Rfl: 0  •  Nerve Stimulator (Cefaly Electrode) MISC, Apply once daily prn migraine for 20 minutes  , Disp: 1 each, Rfl: 0  •  Ubrogepant (UBRELVY) 50 MG tablet, 1-2 tabs at migraine onset, repeat in 2 hours if needed  Max 2 per day  Hold DHE , Disp: 16 tablet, Rfl: 5  •  verapamil (VERELAN PM) 120 MG 24 hr capsule, Take 1 capsule (120 mg total) by mouth daily at bedtime, Disp: 30 capsule, Rfl: 4  •  ergocalciferol (VITAMIN D2) 50,000 units, Take 1 capsule (50,000 Units total) by mouth once a week for 12 doses (Patient not taking: Reported on 3/9/2022 ), Disp: 12 capsule, Rfl: 0    Current Facility-Administered Medications:   •  Botulinum Toxin Type A SOLR 200 Units, 200 Units, Injection, Q3 Months, Marylou Patino PA-C, 200 Units at 07/15/22 8369  •  Botulinum Toxin Type A SOLR 200 Units, 200 Units, Injection, Q3 Months, Alena Holden MD, 200 Units at 10/18/22 1059  No Known Allergies    Vitals: Blood pressure 118/82, pulse 90, resp  rate 12, height 5' 9" (1 753 m), weight 83 6 kg (184 lb 3 2 oz), SpO2 98 %  Body mass index is 27 2 kg/m²  Oxygen Therapy  SpO2: 98 %    Physical Exam:  Physical Exam  Vitals reviewed  Constitutional:       General: He is not in acute distress  Appearance: He is well-developed  He is not toxic-appearing or diaphoretic  HENT:      Head: Normocephalic and atraumatic  Eyes:      General: No scleral icterus    Neck:      Trachea: No tracheal deviation  Cardiovascular:      Rate and Rhythm: Normal rate and regular rhythm  Heart sounds: S1 normal and S2 normal  No murmur heard  No friction rub  No gallop  Pulmonary:      Effort: Pulmonary effort is normal  No tachypnea, accessory muscle usage or respiratory distress  Breath sounds: Normal breath sounds  No stridor  No decreased breath sounds, wheezing, rhonchi or rales  Chest:      Chest wall: No tenderness  Abdominal:      General: Bowel sounds are normal  There is no distension  Palpations: Abdomen is soft  Tenderness: There is no abdominal tenderness  Musculoskeletal:         General: No tenderness  Cervical back: Neck supple  Skin:     General: Skin is warm and dry  Findings: No rash  Neurological:      Mental Status: He is alert and oriented to person, place, and time  GCS: GCS eye subscore is 4  GCS verbal subscore is 5  GCS motor subscore is 6  Psychiatric:         Speech: Speech normal          Behavior: Behavior normal  Behavior is cooperative  Labs: Food and environmental allergy testing negative  TERESA Bates  [facility]        Portions of the record may have been created with voice recognition software  Occasional wrong word or "sound a like" substitutions may have occurred due to the inherent limitations of voice recognition software  Read the chart carefully and recognize, using context, where substitutions have occurred or contact the dictating provider

## 2023-01-18 ENCOUNTER — PROCEDURE VISIT (OUTPATIENT)
Dept: NEUROLOGY | Facility: CLINIC | Age: 53
End: 2023-01-18

## 2023-01-18 VITALS
OXYGEN SATURATION: 98 % | WEIGHT: 184 LBS | HEART RATE: 84 BPM | TEMPERATURE: 96.3 F | DIASTOLIC BLOOD PRESSURE: 74 MMHG | HEIGHT: 69 IN | BODY MASS INDEX: 27.25 KG/M2 | SYSTOLIC BLOOD PRESSURE: 114 MMHG

## 2023-01-18 DIAGNOSIS — G43.719 INTRACTABLE CHRONIC MIGRAINE WITHOUT AURA AND WITHOUT STATUS MIGRAINOSUS: Primary | ICD-10-CM

## 2023-01-18 NOTE — PROGRESS NOTES
Universal Protocol   Consent: Written consent obtained  Consent given by: patient  Time out: Immediately prior to procedure a "time out" was called to verify the correct patient, procedure, equipment, support staff and site/side marked as required  Timeout called at: 1/18/2023 3:11 PM       Chemodenervation     Date/Time 1/18/2023 3:11 PM     Performed by  Nory Lo MD     Authorized by Nory Lo MD        Pre-procedure details      Prepped With: Alcohol     Anesthesia  (see MAR for exact dosages): Anesthesia method:  None   Procedure details     Position:  Upright   Botox     Botox Type:  Type A    Brand:  Botox    Final Concentration per CC:  50 units    Medication Administration:  200 Units Botulinum Toxin Type A 200 units   Procedures     Botox Procedures: chronic headache      Indications: migraines     Post-procedure details      Chemodenervation:  Chronic migraine    Facial Nerve Location[de-identified]  Bilateral facial nerve    Patient tolerance of procedure: Tolerated well, no immediate complications         10 units, 2 sites unit(s) was injected into the procerus muscle     5 unit(s) was injected into the  right  muscle     5 unit(s) was injected into the  left  muscle     10 unit(s) was injected into the  right frontalis muscle     10 unit(s) was injected into the  left frontalis muscle     40 unit(s) was injected into the  right temporalis muscle     40 u, 7 sites unit(s) was injected into the  left temporalis muscle     15 unit(s) was injected into the  right occipitalis muscle     15 unit(s) was injected into the  left occipitalis muscle     10 unit(s) was injected into the  right cervical paraspinal muscle     10 unit(s) was injected into the  left cervical paraspinal muscle     15 unit(s) was injected into the  right trapezius muscle     15 unit(s) was injected into the  left trapezius muscle        A total of 200units were used  A total of 0units were discarded  Diagnosis ICD-10-CM Associated Orders   1   Intractable chronic migraine without aura and without status migrainosus  G43 719 Botulinum Toxin Type A SOLR 200 Units     Chemodenervation

## 2023-02-17 LAB
ALBUMIN SERPL-MCNC: 4.8 G/DL (ref 3.8–4.9)
ALBUMIN/GLOB SERPL: 2.4 {RATIO} (ref 1.2–2.2)
ALP SERPL-CCNC: 81 IU/L (ref 44–121)
ALT SERPL-CCNC: 41 IU/L (ref 0–44)
AST SERPL-CCNC: 21 IU/L (ref 0–40)
BILIRUB SERPL-MCNC: 0.7 MG/DL (ref 0–1.2)
BUN SERPL-MCNC: 15 MG/DL (ref 6–24)
BUN/CREAT SERPL: 15 (ref 9–20)
CALCIUM SERPL-MCNC: 9.8 MG/DL (ref 8.7–10.2)
CHLORIDE SERPL-SCNC: 102 MMOL/L (ref 96–106)
CHOLEST SERPL-MCNC: 191 MG/DL (ref 100–199)
CO2 SERPL-SCNC: 25 MMOL/L (ref 20–29)
CREAT SERPL-MCNC: 1.01 MG/DL (ref 0.76–1.27)
EGFR: 90 ML/MIN/1.73
GLOBULIN SER-MCNC: 2 G/DL (ref 1.5–4.5)
GLUCOSE SERPL-MCNC: 95 MG/DL (ref 70–99)
HDLC SERPL-MCNC: 48 MG/DL
LDLC SERPL CALC-MCNC: 126 MG/DL (ref 0–99)
MICRODELETION SYND BLD/T FISH: NORMAL
POTASSIUM SERPL-SCNC: 4.6 MMOL/L (ref 3.5–5.2)
PROT SERPL-MCNC: 6.8 G/DL (ref 6–8.5)
PSA SERPL-MCNC: 3.6 NG/ML (ref 0–4)
SODIUM SERPL-SCNC: 141 MMOL/L (ref 134–144)
TRIGL SERPL-MCNC: 91 MG/DL (ref 0–149)

## 2023-03-06 LAB — HBA1C MFR BLD HPLC: 5.4 %

## 2023-03-16 ENCOUNTER — OFFICE VISIT (OUTPATIENT)
Dept: FAMILY MEDICINE CLINIC | Facility: CLINIC | Age: 53
End: 2023-03-16

## 2023-03-16 ENCOUNTER — TELEPHONE (OUTPATIENT)
Dept: FAMILY MEDICINE CLINIC | Facility: CLINIC | Age: 53
End: 2023-03-16

## 2023-03-16 VITALS
SYSTOLIC BLOOD PRESSURE: 120 MMHG | TEMPERATURE: 98.3 F | RESPIRATION RATE: 16 BRPM | HEIGHT: 69 IN | DIASTOLIC BLOOD PRESSURE: 84 MMHG | HEART RATE: 78 BPM | BODY MASS INDEX: 28.58 KG/M2 | OXYGEN SATURATION: 99 % | WEIGHT: 193 LBS

## 2023-03-16 DIAGNOSIS — Z01.818 PRE-OP EXAMINATION: ICD-10-CM

## 2023-03-16 DIAGNOSIS — M51.26 HNP (HERNIATED NUCLEUS PULPOSUS), LUMBAR: Primary | ICD-10-CM

## 2023-03-16 DIAGNOSIS — M54.16 LUMBAR RADICULOPATHY: ICD-10-CM

## 2023-03-16 DIAGNOSIS — F31.32 BIPOLAR AFFECTIVE DISORDER, CURRENTLY DEPRESSED, MODERATE (HCC): ICD-10-CM

## 2023-03-16 DIAGNOSIS — E78.00 HYPERCHOLESTEROLEMIA: ICD-10-CM

## 2023-03-16 RX ORDER — ARIPIPRAZOLE 5 MG/1
TABLET ORAL
COMMUNITY
Start: 2023-02-28

## 2023-03-16 NOTE — TELEPHONE ENCOUNTER
----- Message from Whitney Pack DO sent at 3/16/2023  1:26 PM EDT -----  Regarding: labs  Pt seen for a pre op  He had pre op labs done at [facility], EKG and chest x ray    Can we call and acquire the infor for the pre op    Doctors name and phone number is in my HPI for the pre op

## 2023-03-16 NOTE — PROGRESS NOTES
FAMILY PRACTICE PRE-OPERATIVE EVALUATION  [facility]    NAME: Lala Barber  AGE: 47 y o  SEX: male  : 1970     DATE: 3/16/2023    Family Practice Pre-Operative Evaluation      Chief Complaint: Pre-operative Evaluation     Surgery: Back surgery  Anticipated Date of Surgery: 23  Surgeon: Dr Desire Sales      History of Present Illness:     Pt is here for a pre op for back surgery  [facility] but we will get - ordered by - Dr Sonja Mullins - [phone] - phone number        Anesthesia:  general  Bleeding Risk: no recent abnormal bleeding, no remote history of abnormal bleeding and use of Ca-channel blockers (see med list)  Current Anti-platelet/anticoagulation medication: none    Assessment of Cardiac Risk:  · Denies unstable or severe angina or MI in the last 6 weeks or history of stent placement in the last year   · Denies decompensated heart failure (e g  New onset heart failure, NYHA functional class IV heart failure, or worsening existing heart failure)  · Denies significant arrhythmias such as high grade AV block, symptomatic ventricular arrhythmia, newly recognized ventricular tachycardia, supraventricular tachycardia with resting heart rate >100, or symptomatic bradycardia  · Denies severe heart valve disease including aortic stenosis or symptomatic mitral stenosis     Exercise Capacity:  · Able to walk 4 blocks without symptoms?: Yes  · Able to walk 2 flights without symptoms?: Yes    Prior Anesthesia Reactions: Yes, Violently Vomiting with Sodium Pent     Personal history of venous thromboembolic disease? No    History of steroid use for >2 weeks within last year? No         Review of Systems:     Review of Systems   Constitutional: Negative for activity change, appetite change, chills, diaphoresis, fatigue, fever and unexpected weight change     HENT: Negative for congestion, dental problem, ear pain, mouth sores, sinus pressure, sinus pain, sore throat and trouble swallowing  Eyes: Negative for photophobia, discharge and itching  Respiratory: Negative for apnea, chest tightness and shortness of breath  Cardiovascular: Negative for chest pain, palpitations and leg swelling  Gastrointestinal: Negative for abdominal distention, abdominal pain, blood in stool, nausea and vomiting  Endocrine: Negative for cold intolerance, heat intolerance, polydipsia, polyphagia and polyuria  Genitourinary: Negative for difficulty urinating  Musculoskeletal: Negative for arthralgias  Skin: Negative for color change and wound  Neurological: Negative for dizziness, syncope, speech difficulty and headaches  Hematological: Negative for adenopathy  Psychiatric/Behavioral: Negative for agitation and behavioral problems  Current Problem List:     Patient Active Problem List   Diagnosis   • Chronic cough   • Chronic migraine without aura   • Hypercholesterolemia   • Benign familial tremor   • Bipolar affective disorder, currently depressed, moderate (HCC)   • Persistent insomnia of non-organic origin   • Astigmatism of right eye   • Migraine with aura and without status migrainosus, not intractable   • Intractable chronic migraine without aura and without status migrainosus   • Chronic migraine without aura without status migrainosus, not intractable       Allergies:     No Known Allergies    Current Medications:       Current Outpatient Medications:   •  Aimovig 140 MG/ML SOAJ, INJECT 140 MG UNDER THE SKIN EVERY 30 DAYS, Disp: 1 mL, Rfl: 11  •  ARIPiprazole (ABILIFY) 5 mg tablet, TAKE 1/2 TABLET BY MOUTH TWO TIMES A DAY   WATCH FOR SEDATION, Disp: , Rfl:   •  atorvastatin (LIPITOR) 20 mg tablet, Take 1 tablet (20 mg total) by mouth daily, Disp: 90 tablet, Rfl: 1  •  gabapentin (NEURONTIN) 300 mg capsule, Take 1 capsule (300 mg total) by mouth 3 (three) times a day, Disp: 90 capsule, Rfl: 5  •  Nerve Stimulator (Cefaly Electrode) MISC, Apply once daily prn migraine for 20 minutes  , Disp: 1 each, Rfl: 0  •  Ubrogepant (UBRELVY) 50 MG tablet, 1-2 tabs at migraine onset, repeat in 2 hours if needed  Max 2 per day  Hold DHE , Disp: 16 tablet, Rfl: 5  •  verapamil (VERELAN PM) 120 MG 24 hr capsule, Take 1 capsule (120 mg total) by mouth daily at bedtime, Disp: 30 capsule, Rfl: 4    Current Facility-Administered Medications:   •  Botulinum Toxin Type A SOLR 200 Units, 200 Units, Injection, Q3 Months, Nasreen Snow PA-C, 200 Units at 07/15/22 8847  •  Botulinum Toxin Type A SOLR 200 Units, 200 Units, Injection, Q3 Months, Alena Holden MD, 200 Units at 10/18/22 1059  •  Botulinum Toxin Type A SOLR 200 Units, 200 Units, Injection, Q3 Months, Zackery Bernal MD, 200 Units at 01/18/23 1545    Past Medical History:       Past Medical History:   Diagnosis Date   • Chronic migraine without aura without status migrainosus, not intractable 8/6/2021   • Cubital tunnel syndrome     last assessed 1/6/14   • Depression 11/07/2005   • Hyperlipidemia    • Hypertension    • Migraine    • Psychiatric disorder         Past Surgical History:   Procedure Laterality Date   • ELBOW ARTHROPLASTY Bilateral     right elbow reconstruction surgery and transposition surgery of the left elbow   • KNEE ARTHROCENTESIS     • SHOULDER ARTHROPLASTY     • SHOULDER SURGERY  12/06/2018    AC Joint- Left   • SINUS SURGERY      with bone spur removed in uvulectomy done  Had yolanda LUGO at the time, Dr Ema Boudreaux ENT   • TONSILLECTOMY      tonsils removed and also removal of uvula, then a few years ago by Dr Ema Boudreaux  At that time he was having frequent sinus and throat infections, last assessed 12/19/16        Family History   Problem Relation Age of Onset   • Migraines Mother    • Depression Mother    • Alzheimer's disease Mother    • No Known Problems Father    • Arthritis Family    • Breast cancer Family    • Heart disease Family         cardiac disorder   • Hypertension Family    • Breast cancer Sister    • Asperger's syndrome Son         Social History     Socioeconomic History   • Marital status:      Spouse name: Not on file   • Number of children: Not on file   • Years of education: Not on file   • Highest education level: Not on file   Occupational History   • 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 [employer]   Tobacco Use   • Smoking status: Never   • Smokeless tobacco: Never   • Tobacco comments:     never smoked   Vaping Use   • Vaping Use: Never used   Substance and Sexual Activity   • Alcohol use: Not Currently     Comment: rare   • Drug use: No     Comment: social per [employer]   • Sexual activity: Yes     Partners: Female   Other Topics Concern   • Not on file   Social History Narrative    Daily tea consumption     per [employer]     Social Determinants of Health     Financial Resource Strain: Not on file   Food Insecurity: Not on file   Transportation Needs: Not on file   Physical Activity: Not on file   Stress: Not on file   Social Connections: Not on file   Intimate Partner Violence: Not on file   Housing Stability: Not on file        Physical Exam:     /84   Pulse 78   Temp 98 3 °F (36 8 °C) (Temporal)   Resp 16   Ht 5' 9" (1 753 m)   Wt 87 5 kg (193 lb)   SpO2 99%   BMI 28 50 kg/m²     Physical Exam  Vitals and nursing note reviewed  Constitutional:       General: He is not in acute distress  Appearance: He is well-developed  He is not diaphoretic  HENT:      Head: Normocephalic and atraumatic  Right Ear: External ear normal       Left Ear: External ear normal       Nose: Nose normal       Mouth/Throat:      Pharynx: No oropharyngeal exudate  Eyes:      General: No scleral icterus  Right eye: No discharge  Left eye: No discharge  Pupils: Pupils are equal, round, and reactive to light  Neck:      Thyroid: No thyromegaly  Cardiovascular:      Rate and Rhythm: Normal rate        Heart sounds: Normal heart sounds  No murmur heard  Pulmonary:      Effort: Pulmonary effort is normal  No respiratory distress  Breath sounds: Normal breath sounds  No wheezing  Abdominal:      General: Bowel sounds are normal  There is no distension  Palpations: Abdomen is soft  There is no mass  Tenderness: There is no abdominal tenderness  There is no guarding or rebound  Musculoskeletal:         General: Normal range of motion  Skin:     General: Skin is warm and dry  Findings: No erythema or rash  Neurological:      Mental Status: He is alert  Coordination: Coordination normal       Deep Tendon Reflexes: Reflexes normal    Psychiatric:         Behavior: Behavior normal           Data:     Pre-operative work-up    Laboratory Results: have to acquire labs from [facility]     EKG: Have to acquire from [facility]    No results found for this or any previous visit (from the past 672 hour(s))  Assessment & Recommendations:     Problem List Items Addressed This Visit        Other    Hypercholesterolemia    Bipolar affective disorder, currently depressed, moderate (HCC)    Relevant Medications    ARIPiprazole (ABILIFY) 5 mg tablet   Other Visit Diagnoses     HNP (herniated nucleus pulposus), lumbar    -  Primary    Lumbar radiculopathy        Pre-op examination              Pre-Op Evaluation Assessment  47 y o  male with planned surgery: Lumbar laminectomy l4-l5  Known risk factors for perioperative complications: None  but will await lab data  Current medications which may produce withdrawal symptoms if withheld perioperatively: None  Pre-Op Evaluation Plan  1  Further preoperative workup as follows:   - will follow data from [facility] Pre op    2  Medication Management/Recommendations:   Abilify and gabapentin can be taken in the AM with a sip of water    3  Prophylaxis for cardiac events with perioperative beta-blockers: not indicated      4  Patient requires further consultation with: None    Clearance  Patient is CLEARED for surgery Pending his pre op data  Message out to get arnaldo faxed here for review  When I get I will review and finish clearance      Chest XR - no acute abnormalities  EKG NSR no signs of ischemia  PT/INR is back and is acceptable  CBC - acceptable  CMP - acceptable  UA acceptable      Pre op testing is back and is acceptable    Pt is medically clear for surgery       Brianna Crockett, [address]  [address] [facility]  [address]  [place] [address]  Phone#  [phone]  Fax#  [phone]

## 2023-03-16 NOTE — TELEPHONE ENCOUNTER
Call Matty Bowling at insurance company left message for her to call back with an address to send the claim to

## 2023-03-17 DIAGNOSIS — H66.93 OTITIS OF BOTH EARS: Primary | ICD-10-CM

## 2023-03-17 RX ORDER — AMOXICILLIN AND CLAVULANATE POTASSIUM 875; 125 MG/1; MG/1
1 TABLET, FILM COATED ORAL EVERY 12 HOURS SCHEDULED
Qty: 20 TABLET | Refills: 0 | Status: SHIPPED | OUTPATIENT
Start: 2023-03-17 | End: 2023-03-27

## 2023-03-22 ENCOUNTER — TELEPHONE (OUTPATIENT)
Dept: NEUROLOGY | Facility: CLINIC | Age: 53
End: 2023-03-22

## 2023-03-22 NOTE — TELEPHONE ENCOUNTER
Called pt to verify his insurance as it is showing elapsed  Pt states that his insurance is still active and would like to speak to Michael Perez [address]       Message sent to Marine Bennett to call patient at earliest convenience, thank you

## 2023-06-01 NOTE — PROGRESS NOTES
Assessment/Plan:  1  Right shoulder pain, unspecified chronicity  XR shoulder 2+ vw right   2  Biceps tendinitis of right upper extremity  Ambulatory referral to Physical Therapy       Tyrone Amezcua appears to have both proximal and distal biceps tendinitis  Both tendons appear intact  We wrote him for physical therapy for these today  We also advised ice and over-the-counter medications as needed  He will follow up as needed  Subjective:   Terence Amado is a 53 y o  male who presents today for evaluation of his right shoulder and right elbow  He states these both begin bother him a couple months ago with no inciting event prior to the onset of his symptoms  As for the shoulder, he notes anterior pain which can radiate down the arm some at times  This is worse with activity and slightly better with rest   He does have a history of prior rotator cuff repair performed about 10 years ago  He notes good range of motion of the shoulder and fair strength  As for the elbow, he notes pain about the antecubital fossa which again is worse with activity and slightly better with rest   He also notes increased pain with full extension of the elbow  This seems to be bothering him more than the shoulder at this point  He notes good sensation of the right upper extremity  Review of Systems   Constitutional: Negative for chills, fever and unexpected weight change  HENT: Negative for hearing loss, nosebleeds and sore throat  Eyes: Negative for pain, redness and visual disturbance  Respiratory: Negative for cough, shortness of breath and wheezing  Cardiovascular: Negative for chest pain, palpitations and leg swelling  Gastrointestinal: Negative for abdominal pain, nausea and vomiting  Endocrine: Negative for polyphagia and polyuria  Genitourinary: Negative for dysuria and hematuria  Musculoskeletal:        See HPI   Skin: Negative for rash and wound     Neurological: Negative for dizziness, numbness and Patient here today for EKG test.     headaches  Psychiatric/Behavioral: Negative for decreased concentration and suicidal ideas  The patient is not nervous/anxious  Past Medical History:   Diagnosis Date    Cubital tunnel syndrome     last assessed 1/6/14    Depression 11/07/2005    Hyperlipidemia     Hypertension     Migraine     Psychiatric disorder        Past Surgical History:   Procedure Laterality Date    ELBOW ARTHROPLASTY Bilateral     right elbow reconstruction surgery and transposition surgery of the left elbow    KNEE ARTHROCENTESIS      SHOULDER ARTHROPLASTY      SINUS SURGERY      with bone spur removed in uvulectomy done  Had milod PARISH at the time, Dr Dio Arredondo ENT    TONSILLECTOMY      tonsils removed and also removal of uvula, then a few years ago by Dr Dio Arredondo  At that time he was having frequent sinus and throat infections, last assessed 12/19/16       Family History   Problem Relation Age of Onset   [facility] Migraines Mother     Depression Mother     No Known Problems Father     Arthritis Family     Breast cancer Family     Heart disease Family      cardiac disorder    Hypertension Family     Breast cancer Sister     Asperger's syndrome Son        Social History     Occupational History    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 [employer]      Social History Main Topics    Smoking status: Never Smoker    Smokeless tobacco: Never Used      Comment: exposure to second hand smoke    Alcohol use No    Drug use: No      Comment: social per [employer]    Sexual activity: Yes     Partners: Female         Current Outpatient Prescriptions:     atorvastatin (LIPITOR) 20 mg tablet, Take 1 tablet (20 mg total) by mouth daily, Disp: 30 tablet, Rfl: 0    escitalopram (LEXAPRO) 20 mg tablet, Take 1 tablet (20 mg total) by mouth daily, Disp: , Rfl: 0    gabapentin (NEURONTIN) 300 mg capsule, Take 1 capsule (300 mg total) by mouth 3 (three) times a day, Disp: 90 capsule, Rfl: 5    lamoTRIgine (LAMICTAL) 150 MG tablet, Take 1 tablet (150 mg total) by mouth 2 (two) times a day, Disp: , Rfl: 0    verapamil (VERELAN PM) 120 MG 24 hr capsule, Take 1 capsule (120 mg total) by mouth daily at bedtime, Disp: 30 capsule, Rfl: 4    No Known Allergies    Objective: There were no vitals filed for this visit  Right Elbow Exam     Tenderness   Right elbow tenderness location: Tenderness over biceps tendon  Tendon easily palpable with no defect noted  Range of Motion   Extension: normal   Flexion: normal   Pronation: normal   Supination: normal     Muscle Strength   The patient has normal right elbow strength (5/5 strength elbow flexion and extension  Pain with flexion and supination of the elbow  )  Pronation:  5/5   Supination:  5/5     Tests Varus: negative  Valgus: negative        Other   Erythema: absent  Sensation: normal  Pulse: present      Right Shoulder Exam     Tenderness   The patient is experiencing tenderness in the biceps tendon  Range of Motion   Active Abduction: normal   Forward Flexion: normal   External Rotation: normal   Internal Rotation 0 degrees: normal     Muscle Strength   Abduction: 5/5   Internal Rotation: 5/5   External Rotation: 5/5   Biceps: 4/5     Tests   Apprehension: negative  Drop Arm: negative  Hawkin's test: negative  Impingement: negative    Other   Erythema: absent  Sensation: normal  Pulse: present    Comments:  Positive speed's test   Negative Columbia's test             Physical Exam   Constitutional: He is oriented to person, place, and time  He appears well-developed  HENT:   Head: Normocephalic and atraumatic  Eyes: Conjunctivae are normal    Neck: Neck supple  Cardiovascular: Intact distal pulses  Pulmonary/Chest: Effort normal    Abdominal: Soft  Neurological: He is alert and oriented to person, place, and time  Skin: Skin is warm and dry  Psychiatric: He has a normal mood and affect  His behavior is normal    Vitals reviewed        I have personally reviewed pertinent films in PACS and my interpretation Detail Level: Detailed is as follows:  X-ray right shoulder:  Negative  Depth Of Biopsy: dermis Was A Bandage Applied: Yes Size Of Lesion In Cm: 0 Biopsy Type: H and E Biopsy Method: Dermablade Anesthesia Type: 2% lidocaine without epinephrine and a 1:10 solution of 8.4% sodium bicarbonate Anesthesia Volume In Cc: 0.5 Hemostasis: Aluminum Chloride Wound Care: Petrolatum Dressing: bandage Destruction After The Procedure: No Type Of Destruction Used: Curettage Curettage Text: The wound bed was treated with curettage after the biopsy was performed. Cryotherapy Text: The wound bed was treated with cryotherapy after the biopsy was performed. Electrodesiccation Text: The wound bed was treated with electrodesiccation after the biopsy was performed. Electrodesiccation And Curettage Text: The wound bed was treated with electrodesiccation and curettage after the biopsy was performed. Silver Nitrate Text: The wound bed was treated with silver nitrate after the biopsy was performed. Lab: [id number] Lab Facility: [id number] Consent: Written consent was obtained and risks were reviewed including but not limited to scarring, infection, bleeding, scabbing, incomplete removal, nerve damage and allergy to anesthesia. Post-Care Instructions: I reviewed with the patient in detail post-care instructions. Patient is to keep the biopsy site dry overnight, and then apply bacitracin twice daily until healed. Patient may apply hydrogen peroxide soaks to remove any crusting. Notification Instructions: Patient will be notified of biopsy results. However, patient instructed to call the office if not contacted within 2 weeks. Billing Type: Third-Party Bill Information: Selecting Yes will display possible errors in your note based on the variables you have selected. This validation is only offered as a suggestion for you. PLEASE NOTE THAT THE VALIDATION TEXT WILL BE REMOVED WHEN YOU FINALIZE YOUR NOTE. IF YOU WANT TO FAX A PRELIMINARY NOTE YOU WILL NEED TO TOGGLE THIS TO 'NO' IF YOU DO NOT WANT IT IN YOUR FAXED NOTE.

## 2023-06-20 DIAGNOSIS — E78.00 HYPERCHOLESTEROLEMIA: ICD-10-CM

## 2023-06-20 RX ORDER — ATORVASTATIN CALCIUM 20 MG/1
TABLET, FILM COATED ORAL
Qty: 90 TABLET | Refills: 1 | Status: SHIPPED | OUTPATIENT
Start: 2023-06-20

## 2023-07-19 ENCOUNTER — OFFICE VISIT (OUTPATIENT)
Dept: PULMONOLOGY | Facility: MEDICAL CENTER | Age: 53
End: 2023-07-19
Payer: COMMERCIAL

## 2023-07-19 VITALS
WEIGHT: 189 LBS | HEART RATE: 75 BPM | TEMPERATURE: 97.6 F | DIASTOLIC BLOOD PRESSURE: 80 MMHG | BODY MASS INDEX: 27.99 KG/M2 | SYSTOLIC BLOOD PRESSURE: 110 MMHG | OXYGEN SATURATION: 97 % | RESPIRATION RATE: 12 BRPM | HEIGHT: 69 IN

## 2023-07-19 DIAGNOSIS — G43.719 INTRACTABLE CHRONIC MIGRAINE WITHOUT AURA AND WITHOUT STATUS MIGRAINOSUS: Chronic | ICD-10-CM

## 2023-07-19 DIAGNOSIS — R05.3 CHRONIC COUGH: Primary | Chronic | ICD-10-CM

## 2023-07-19 PROCEDURE — 99214 OFFICE O/P EST MOD 30 MIN: CPT | Performed by: NURSE PRACTITIONER

## 2023-07-19 NOTE — PROGRESS NOTES
Assessment/Plan:     Problem List Items Addressed This Visit        Cardiovascular and Mediastinum    Intractable chronic migraine without aura and without status migrainosus (Chronic)     And is currently following with neurologist and does get Botox injections for migraine. He also is taking a maintenance medication has been doing significant significantly better            Other    Chronic cough - Primary (Chronic)     Has not used an inhaled medication for at least 2 to 3 months. The last time he used this was only half a year ago or more. He saw no improvement. He did have a normal PFT. He is on gabapentin 300 mg 3 times daily and is stable on this he appears to be doing well. He does have a cough on occasion but is much improved              Return in about 1 year (around 7/19/2024). All questions are answered to the patient's satisfaction and understanding. He verbalizes understanding. He is encouraged to call with any further questions or concerns. Portions of the record may have been created with voice recognition software. Occasional wrong word or "sound a like" substitutions may have occurred due to the inherent limitations of voice recognition software. Read the chart carefully and recognize, using context, where substitutions have occurred. Electronically Signed by TERESA Luna    ______________________________________________________________________    Chief Complaint:   Chief Complaint   Patient presents with   • Follow-up   • Cough       Patient ID: Александр Villagomez is a 47 y.o. y.o. male has a past medical history of Chronic migraine without aura without status migrainosus, not intractable (8/6/2021), Cubital tunnel syndrome, Depression (11/07/2005), Hyperlipidemia, Hypertension, Migraine, and Psychiatric disorder. 7/19/2023  Patient presents today for follow-up visit. HPI Александр Villagomez is a 52-year-old male with a history of chronic cough.   His last pulmonary function test was done in August 2022. Forced vital capacity was 4.18 L or 101% of predicted, FEV1 was 3.63 L or 113% of predicted and obstruction ratio was 132. Only he had a food allergy panel as well as [facility] allergy panel that was recommended by  was essentially normal.  His last chest x-ray was in July 2020. This was reviewed from a previous chest x-ray that was done in July 2019 and linear atelectasis was noted the left lung base otherwise normal Annelise Sotomayor is never been a smoker 3 times daily  His chest x-ray was in March 2023. No active disease was noted    Review of Systems   Constitutional: Negative. HENT: Negative. Eyes: Negative. Respiratory: Positive for cough. Dry cough on occasion. At times it can be somewhat productive   Cardiovascular: Negative. Gastrointestinal: Negative. Endocrine: Negative. Genitourinary: Negative. Musculoskeletal: Negative. Skin: Negative. Allergic/Immunologic: Negative. Neurological: Negative. Hematological: Negative. Psychiatric/Behavioral: Negative. Smoking history: He reports that he has never smoked. He has never used smokeless tobacco.    The following portions of the patient's history were reviewed and updated as appropriate: current medications, past family history, past medical history, past social history, past surgical history and problem list.    Immunization History   Administered Date(s) Administered   • Tdap 02/07/2017     Current Outpatient Medications   Medication Sig Dispense Refill   • Aimovig 140 MG/ML SOAJ INJECT 140 MG UNDER THE SKIN EVERY 30 DAYS 1 mL 11   • ARIPiprazole (ABILIFY) 5 mg tablet TAKE 1/2 TABLET BY MOUTH TWO TIMES A DAY.  WATCH FOR SEDATION     • atorvastatin (LIPITOR) 20 mg tablet TAKE ONE TABLET BY MOUTH EVERY DAY 90 tablet 1   • gabapentin (NEURONTIN) 300 mg capsule Take 1 capsule (300 mg total) by mouth 3 (three) times a day 90 capsule 5   • Nerve Stimulator (Cefaly Electrode) MISC Apply once daily prn migraine for 20 minutes. 1 each 0   • Ubrogepant (UBRELVY) 50 MG tablet 1-2 tabs at migraine onset, repeat in 2 hours if needed. Max 2 per day. Hold DHE. 16 tablet 5   • verapamil (VERELAN PM) 120 MG 24 hr capsule Take 1 capsule (120 mg total) by mouth daily at bedtime 30 capsule 4     Current Facility-Administered Medications   Medication Dose Route Frequency Provider Last Rate Last Admin   • Botulinum Toxin Type A SOLR 200 Units  200 Units Injection Q3 Months Rajinder Chavez PA-C   200 Units at 07/15/22 2694   • Botulinum Toxin Type A SOLR 200 Units  200 Units Injection Q3 Months Clay Ridley MD   200 Units at 10/18/22 1059   • Botulinum Toxin Type A SOLR 200 Units  200 Units Injection Q3 Months Clay Ridley MD   200 Units at 01/18/23 1545   • Botulinum Toxin Type A SOLR 200 Units  200 Units Injection Q3 Months Clay Ridley MD   200 Units at 04/19/23 1658     Allergies: Patient has no known allergies. Objective:  Vitals:    07/19/23 0935   BP: 110/80   BP Location: Left arm   Patient Position: Sitting   Cuff Size: Standard   Pulse: 75   Resp: 12   Temp: 97.6 °F (36.4 °C)   TempSrc: Temporal   SpO2: 97%   Weight: 85.7 kg (189 lb)   Height: 5' 9" (1.753 m)   Oxygen Therapy  SpO2: 97 %  . Wt Readings from Last 3 Encounters:   07/19/23 85.7 kg (189 lb)   04/19/23 87.1 kg (192 lb)   03/16/23 87.5 kg (193 lb)     Body mass index is 27.91 kg/m². Physical Exam  Constitutional:       General: He is in acute distress. Appearance: He is normal weight. HENT:      Head: Normocephalic and atraumatic. Nose: Nose normal.      Mouth/Throat:      Mouth: Mucous membranes are moist.      Pharynx: Oropharynx is clear. Comments: Mallampati 4  Eyes:      Pupils: Pupils are equal, round, and reactive to light. Cardiovascular:      Rate and Rhythm: Normal rate and regular rhythm. Pulses: Normal pulses. Pulmonary:      Effort: Pulmonary effort is normal.      Breath sounds: Normal breath sounds. Musculoskeletal:         General: Normal range of motion. Cervical back: Normal range of motion. Skin:     General: Skin is warm and dry. Capillary Refill: Capillary refill takes less than 2 seconds. Neurological:      General: No focal deficit present. Mental Status: He is alert and oriented to person, place, and time. Psychiatric:         Mood and Affect: Mood normal.         Behavior: Behavior normal.         Lab Review:   Orders Only on 03/06/2023   Component Date Value   • Hemoglobin A1C 03/06/2023 5.4    Orders Only on 02/16/2023   Component Date Value   • Glucose, Random 02/16/2023 95    • BUN 02/16/2023 15    • Creatinine 02/16/2023 1.01    • eGFR 02/16/2023 90    • SL AMB BUN/CREATININE RA* 02/16/2023 15    • Sodium 02/16/2023 141    • Potassium 02/16/2023 4.6    • Chloride 02/16/2023 102    • CO2 02/16/2023 25    • CALCIUM 02/16/2023 9.8    • Protein, Total 02/16/2023 6.8    • Albumin 02/16/2023 4.8    • Globulin, Total 02/16/2023 2.0    • Albumin/Globulin Ratio 02/16/2023 2.4 (H)    • TOTAL BILIRUBIN 02/16/2023 0.7    • Alk Phos Isoenzymes 02/16/2023 81    • AST 02/16/2023 21    • ALT 02/16/2023 41    • Cholesterol, Total 02/16/2023 191    • Triglycerides 02/16/2023 91    • HDL 02/16/2023 48    • LDL Calculated 02/16/2023 126 (H)    • Prostate Specific Antige* 02/16/2023 3.6    • Interpretation 02/16/2023 Note        Past Surgical History:   Procedure Laterality Date   • ELBOW ARTHROPLASTY Bilateral     right elbow reconstruction surgery and transposition surgery of the left elbow   • KNEE ARTHROCENTESIS     • SHOULDER ARTHROPLASTY     • SHOULDER SURGERY  12/06/2018    AC Joint- Left   • SINUS SURGERY      with bone spur removed in uvulectomy done. Had [facility] at the time, Dr. Bladimir Peñaloza ENT   • TONSILLECTOMY      tonsils removed and also removal of uvula, then a few years ago by Dr. Bladimir Peñaloza. At that time he was having frequent sinus and throat infections, last assessed 12/19/16 Family History   Problem Relation Age of Onset   • Migraines Mother    • Depression Mother    • Alzheimer's disease Mother    • No Known Problems Father    • Arthritis Family    • Breast cancer Family    • Heart disease Family         cardiac disorder   • Hypertension Family    • Breast cancer Sister    • Asperger's syndrome Son         Diagnostics:  I have personally reviewed pertinent reports. Office Spirometry Results:     ESS:    No results found.

## 2023-07-19 NOTE — ASSESSMENT & PLAN NOTE
Has not used an inhaled medication for at least 2 to 3 months. The last time he used this was only half a year ago or more. He saw no improvement. He did have a normal PFT. He is on gabapentin 300 mg 3 times daily and is stable on this he appears to be doing well.   He does have a cough on occasion but is much improved

## 2023-07-19 NOTE — PATIENT INSTRUCTIONS
Chronic Cough   WHAT YOU NEED TO KNOW:   A chronic cough is a cough that lasts more than 4 weeks in children or 8 weeks in adults. DISCHARGE INSTRUCTIONS:   Call your local emergency number (911 in the [address]) for any of the following: You cough up blood. You faint when you cough. You have trouble breathing. Call your doctor if:   You have new or worsening symptoms. You have severe pain when you take a deep breath. You become very tired after a coughing fit. You have trouble sleeping because of the coughing. You have questions or concerns about your condition or care. Medicines:   Medicines  may be needed to stop your cough. You may also need medicine to treat allergies or acid reflux, or decrease swelling in your airways. If you have a respiratory infection, you may need antibiotics. Medicine may be given as a pill or to use in an inhaler. Take your medicine as directed. Contact your healthcare provider if you think your medicine is not helping or if you have side effects. Tell your provider if you are allergic to any medicine. Keep a list of the medicines, vitamins, and herbs you take. Include the amounts, and when and why you take them. Bring the list or the pill bottles to follow-up visits. Carry your medicine list with you in case of an emergency. Self-care:   Prevent acid reflex. Acid reflux can make your chronic cough worse. Raise your head and upper back when you sleep. Place 2 or more pillows behind your head or sleep in a recliner. Do not lie down for at least 1 hour after you eat. Do not have foods or drinks that increase heartburn. Ask your healthcare provider for other ways to prevent acid reflux. Do not smoke. Encourage your adolescent child not to smoke. Nicotine and other chemicals in cigarettes and cigars can cause lung damage. They can also make your cough worse. Ask your healthcare provider for information if you currently smoke and need help to quit. E-cigarettes or smokeless tobacco still contain nicotine. Talk to your healthcare provider before you use these products. Stay away from secondhand smoke. Do not let people smoke in your car, home, or near your child. Do not stand near someone that is smoking. This includes anyone that is smoking an E-cigarrete. Avoid anything that triggers your allergies or irritates your throat. Allergens and irritants can make your chronic cough worse. Allergens may include dust mites, pollen, pet dander, or mold. Wear a mask if you work around pollutants or irritants. Ask your healthcare provider for more ways to decrease your exposure to allergens or irritants. Drink plenty of liquids as directed. Liquids may help relieve throat discomfort that causes you to cough. Add honey to tea or hot water to help ease your throat pain. Ask how much liquid to drink each day and which liquids are best for you. Follow up with your healthcare provider as directed: You may need to return for more tests. Your healthcare provider may refer to you other specialists. Write down your questions so you remember to ask them during your visits. © Copyright [employer] 2022 Information is for End User's use only and may not be sold, redistributed or otherwise used for commercial purposes. The above information is an  only. It is not intended as medical advice for individual conditions or treatments. Talk to your doctor, nurse or pharmacist before following any medical regimen to see if it is safe and effective for you.

## 2023-07-19 NOTE — ASSESSMENT & PLAN NOTE
And is currently following with neurologist and does get Botox injections for migraine.   He also is taking a maintenance medication has been doing significant significantly better

## 2023-07-25 ENCOUNTER — PROCEDURE VISIT (OUTPATIENT)
Dept: NEUROLOGY | Facility: CLINIC | Age: 53
End: 2023-07-25
Payer: COMMERCIAL

## 2023-07-25 VITALS
OXYGEN SATURATION: 98 % | HEIGHT: 69 IN | SYSTOLIC BLOOD PRESSURE: 108 MMHG | HEART RATE: 92 BPM | DIASTOLIC BLOOD PRESSURE: 82 MMHG | WEIGHT: 190 LBS | TEMPERATURE: 97.3 F | BODY MASS INDEX: 28.14 KG/M2

## 2023-07-25 DIAGNOSIS — G43.719 INTRACTABLE CHRONIC MIGRAINE WITHOUT AURA AND WITHOUT STATUS MIGRAINOSUS: Primary | ICD-10-CM

## 2023-07-25 PROCEDURE — 64615 CHEMODENERV MUSC MIGRAINE: CPT | Performed by: PSYCHIATRY & NEUROLOGY

## 2023-07-25 RX ORDER — RIMEGEPANT SULFATE 75 MG/75MG
75 TABLET, ORALLY DISINTEGRATING ORAL ONCE AS NEEDED
Qty: 8 TABLET | Refills: 1 | Status: SHIPPED | OUTPATIENT
Start: 2023-07-25

## 2023-07-25 NOTE — PROGRESS NOTES
Universal Protocol   Consent: Verbal consent not obtained. Written consent obtained. Consent given by: patient  Time out: Immediately prior to procedure a "time out" was called to verify the correct patient, procedure, equipment, support staff and site/side marked as required. Timeout called at: 7/25/2023 12:41 PM.      Chemodenervation     Date/Time 7/25/2023 3:00 PM     Performed by  Marcelino Jackson MD   Authorized by Marcelino Jackson MD       Pre-procedure details      Prepped With: Alcohol     Anesthesia  (see MAR for exact dosages): Anesthesia method:  None   Procedure details     Position:  Upright   Botox     Botox Type:  Type A    Brand:  Botox    Final Concentration per CC:  50 units    Needle Gauge:  30 G 2.5 inch   Procedures     Botox Procedures: chronic headache      Indications: migraines     Post-procedure details      Chemodenervation:  Chronic migraine    Facial Nerve Location[de-identified]  Bilateral facial nerve    Patient tolerance of procedure: Tolerated well, no immediate complications       10 units, 2 sites unit(s) was injected into the procerus muscle.    5 unit(s) was injected into the  right  muscle.    5 unit(s) was injected into the  left  muscle.    10 unit(s) was injected into the  right frontalis muscle.    10 unit(s) was injected into the  left frontalis muscle.    40 unit(s) was injected into the  right temporalis muscle.    40 u, 7 sites unit(s) was injected into the  left temporalis muscle.    15 unit(s) was injected into the  right occipitalis muscle.    15 unit(s) was injected into the  left occipitalis muscle.    10 unit(s) was injected into the  right cervical paraspinal muscle.    10 unit(s) was injected into the  left cervical paraspinal muscle.    15 unit(s) was injected into the  right trapezius muscle.    15 unit(s) was injected into the  left trapezius muscle.       A total of 200units were used.  A total of 0units were discarded.      Diagnosis ICD-10-CM Associated Orders   1.  Intractable chronic migraine without aura and without status migrainosus  G43.719 Botulinum Toxin Type A SOLR 200 Units     Chemodenervation     rimegepant sulfate (Nurtec) 75 mg TBDP

## 2023-07-27 ENCOUNTER — TELEPHONE (OUTPATIENT)
Dept: NEUROLOGY | Facility: CLINIC | Age: 53
End: 2023-07-27

## 2023-07-27 NOTE — TELEPHONE ENCOUNTER
2nd fax received from [address] [place]  Meds requesting a PA for the medication Nurtec, fax can be found in the clinical nurse basket team 5.

## 2023-07-27 NOTE — TELEPHONE ENCOUNTER
Received fax from [facility]. [facility] requires PA  Key [id number]    PA initiated on CMM  CaseId:[id number];Status:Approved; Review Type:Prior Auth; Coverage Start Date:06/27/2023; Coverage End Date:07/26/2024;      Will call pharmacy

## 2023-07-27 NOTE — TELEPHONE ENCOUNTER
Called [facility] pharmacy, spoke to Sudarshan Flores and made aware of the approval (M6) moves spontaneously and purposely

## 2023-08-08 ENCOUNTER — OFFICE VISIT (OUTPATIENT)
Dept: FAMILY MEDICINE CLINIC | Facility: CLINIC | Age: 53
End: 2023-08-08
Payer: COMMERCIAL

## 2023-08-08 VITALS
RESPIRATION RATE: 18 BRPM | HEART RATE: 78 BPM | HEIGHT: 69 IN | WEIGHT: 189 LBS | SYSTOLIC BLOOD PRESSURE: 106 MMHG | OXYGEN SATURATION: 98 % | BODY MASS INDEX: 27.99 KG/M2 | DIASTOLIC BLOOD PRESSURE: 70 MMHG | TEMPERATURE: 97.8 F

## 2023-08-08 DIAGNOSIS — J01.90 ACUTE SINUSITIS, RECURRENCE NOT SPECIFIED, UNSPECIFIED LOCATION: Primary | ICD-10-CM

## 2023-08-08 DIAGNOSIS — R05.9 COUGH: ICD-10-CM

## 2023-08-08 PROCEDURE — 99213 OFFICE O/P EST LOW 20 MIN: CPT | Performed by: FAMILY MEDICINE

## 2023-08-08 RX ORDER — GABAPENTIN 300 MG/1
300 CAPSULE ORAL 3 TIMES DAILY
Qty: 90 CAPSULE | Refills: 5 | Status: SHIPPED | OUTPATIENT
Start: 2023-08-08

## 2023-08-08 RX ORDER — AMOXICILLIN 875 MG/1
875 TABLET, COATED ORAL 2 TIMES DAILY
Qty: 14 TABLET | Refills: 0 | Status: SHIPPED | OUTPATIENT
Start: 2023-08-08 | End: 2023-08-15

## 2023-08-08 NOTE — PROGRESS NOTES
Name: Jaime Mario      : 1970      MRN: [id number]  Encounter Provider: Ibrahima Renteria MD  Encounter Date: 2023   Encounter department: [facility]     1. Acute sinusitis, recurrence not specified, unspecified location  -     amoxicillin (AMOXIL) 875 mg tablet; Take 1 tablet (875 mg total) by mouth 2 (two) times a day for 7 days       Rest, hydration, humidifier use, OTC mucinex. Subjective      Earache   There is pain in both ears. This is a new problem. The current episode started in the past 7 days. The problem has been gradually worsening. There has been no fever. Associated symptoms include coughing, headaches, rhinorrhea and a sore throat. Pertinent negatives include no abdominal pain, diarrhea, ear discharge, hearing loss, neck pain, rash or vomiting. Treatments tried: mucinex. The treatment provided no relief. His past medical history is significant for a chronic ear infection. Review of Systems   HENT: Positive for ear pain, rhinorrhea and sore throat. Negative for ear discharge and hearing loss. Respiratory: Positive for cough. Gastrointestinal: Negative for abdominal pain, diarrhea and vomiting. Musculoskeletal: Negative for neck pain. Skin: Negative for rash. Neurological: Positive for headaches. Current Outpatient Medications on File Prior to Visit   Medication Sig   • Aimovig 140 MG/ML SOAJ INJECT 140 MG UNDER THE SKIN EVERY 30 DAYS   • atorvastatin (LIPITOR) 20 mg tablet TAKE ONE TABLET BY MOUTH EVERY DAY   • Nerve Stimulator (Cefaly Electrode) MISC Apply once daily prn migraine for 20 minutes. • rimegepant sulfate (Nurtec) 75 mg TBDP Take 1 tablet (75 mg total) by mouth once as needed (migraine) for up to 1 dose   • verapamil (VERELAN PM) 120 MG 24 hr capsule Take 1 capsule (120 mg total) by mouth daily at bedtime   • ARIPiprazole (ABILIFY) 5 mg tablet TAKE 1/2 TABLET BY MOUTH TWO TIMES A DAY.  WATCH FOR SEDATION (Patient not taking: Reported on 8/8/2023)   • [DISCONTINUED] gabapentin (NEURONTIN) 300 mg capsule Take 1 capsule (300 mg total) by mouth 3 (three) times a day       Objective     /70   Pulse 78   Temp 97.8 °F (36.6 °C)   Resp 18   Ht 5' 9" (1.753 m)   Wt 85.7 kg (189 lb)   SpO2 98%   BMI 27.91 kg/m²     Physical Exam  Constitutional:       General: He is not in acute distress. Appearance: He is well-developed. He is not diaphoretic. HENT:      Head: Normocephalic and atraumatic. Right Ear: Tympanic membrane and ear canal normal. No drainage, swelling or tenderness. No middle ear effusion. Tympanic membrane is not erythematous. Left Ear: Tympanic membrane and ear canal normal. No drainage, swelling or tenderness. No middle ear effusion. Tympanic membrane is not erythematous. Nose: No congestion or rhinorrhea. Mouth/Throat:      Mouth: Mucous membranes are moist. No oral lesions. Pharynx: No pharyngeal swelling, oropharyngeal exudate, posterior oropharyngeal erythema or uvula swelling. Eyes:      General: No scleral icterus. Right eye: No discharge. Left eye: No discharge. Conjunctiva/sclera: Conjunctivae normal.   Cardiovascular:      Rate and Rhythm: Normal rate and regular rhythm. Pulses: Normal pulses. Pulmonary:      Effort: Pulmonary effort is normal. No respiratory distress. Breath sounds: Normal breath sounds. No stridor. No wheezing, rhonchi or rales. Chest:      Chest wall: No tenderness. Musculoskeletal:         General: Normal range of motion. Cervical back: Normal range of motion. Skin:     General: Skin is warm. Neurological:      Mental Status: He is alert and oriented to person, place, and time. Psychiatric:         Mood and Affect: Mood normal.         Behavior: Behavior normal.         Thought Content:  Thought content normal.         Judgment: Judgment normal.       Hiral Marshall MD

## 2023-10-18 ENCOUNTER — TELEPHONE (OUTPATIENT)
Dept: NEUROLOGY | Facility: CLINIC | Age: 53
End: 2023-10-18

## 2023-10-20 LAB — HBA1C MFR BLD HPLC: 5.3 %

## 2023-11-01 ENCOUNTER — CONSULT (OUTPATIENT)
Dept: FAMILY MEDICINE CLINIC | Facility: CLINIC | Age: 53
End: 2023-11-01
Payer: OTHER MISCELLANEOUS

## 2023-11-01 ENCOUNTER — TELEPHONE (OUTPATIENT)
Dept: NEUROLOGY | Facility: CLINIC | Age: 53
End: 2023-11-01

## 2023-11-01 ENCOUNTER — PROCEDURE VISIT (OUTPATIENT)
Dept: NEUROLOGY | Facility: CLINIC | Age: 53
End: 2023-11-01
Payer: COMMERCIAL

## 2023-11-01 VITALS
DIASTOLIC BLOOD PRESSURE: 80 MMHG | BODY MASS INDEX: 28.19 KG/M2 | HEIGHT: 68 IN | WEIGHT: 186 LBS | HEART RATE: 88 BPM | TEMPERATURE: 98.4 F | SYSTOLIC BLOOD PRESSURE: 138 MMHG | RESPIRATION RATE: 18 BRPM

## 2023-11-01 VITALS
DIASTOLIC BLOOD PRESSURE: 82 MMHG | HEIGHT: 69 IN | SYSTOLIC BLOOD PRESSURE: 116 MMHG | TEMPERATURE: 97.1 F | BODY MASS INDEX: 27.7 KG/M2 | WEIGHT: 187 LBS

## 2023-11-01 DIAGNOSIS — G43.719 INTRACTABLE CHRONIC MIGRAINE WITHOUT AURA AND WITHOUT STATUS MIGRAINOSUS: Primary | ICD-10-CM

## 2023-11-01 DIAGNOSIS — E78.00 HYPERCHOLESTEROLEMIA: ICD-10-CM

## 2023-11-01 DIAGNOSIS — M54.16 LUMBAR RADICULOPATHY, CHRONIC: Primary | ICD-10-CM

## 2023-11-01 PROCEDURE — 64615 CHEMODENERV MUSC MIGRAINE: CPT | Performed by: PSYCHIATRY & NEUROLOGY

## 2023-11-01 PROCEDURE — 99213 OFFICE O/P EST LOW 20 MIN: CPT | Performed by: FAMILY MEDICINE

## 2023-11-01 NOTE — PROGRESS NOTES
[facility] PRE-OPERATIVE EVALUATION  [facility]    NAME: Rafael Pan  AGE: 47 y.o. SEX: male  : 1970     DATE: 2023    [facility] Pre-Operative Evaluation      Chief Complaint: Pre-operative Evaluation     Surgery: Lower back surgery  Anticipated Date of Surgery: 23  Surgeon: Dr Ketan Isbell      History of Present Illness:     Pt is sched for a pre op          Anesthesia:  general  Bleeding Risk: no recent abnormal bleeding, no remote history of abnormal bleeding, and no use of Ca-channel blockers  Current Anti-platelet/anticoagulation medication:  none    Assessment of Cardiac Risk:  Denies unstable or severe angina or MI in the last 6 weeks or history of stent placement in the last year   Denies decompensated heart failure (e.g. New onset heart failure, NYHA functional class IV heart failure, or worsening existing heart failure)  Denies significant arrhythmias such as high grade AV block, symptomatic ventricular arrhythmia, newly recognized ventricular tachycardia, supraventricular tachycardia with resting heart rate >100, or symptomatic bradycardia  Denies severe heart valve disease including aortic stenosis or symptomatic mitral stenosis     Exercise Capacity:  Able to walk 4 blocks without symptoms?: Yes  Able to walk 2 flights without symptoms?: Yes    Prior Anesthesia Reactions: No     Personal history of venous thromboembolic disease? No    History of steroid use for >2 weeks within last year? No         Review of Systems:     Review of Systems   Constitutional:  Negative for activity change, appetite change, chills, diaphoresis, fatigue, fever and unexpected weight change. HENT:  Negative for congestion, dental problem, ear pain, mouth sores, sinus pressure, sinus pain, sore throat and trouble swallowing. Eyes:  Negative for photophobia, discharge and itching. Respiratory:  Negative for apnea, chest tightness and shortness of breath. Cardiovascular:  Negative for chest pain, palpitations and leg swelling. Gastrointestinal:  Negative for abdominal distention, abdominal pain, blood in stool, nausea and vomiting. Endocrine: Negative for cold intolerance, heat intolerance, polydipsia, polyphagia and polyuria. Genitourinary:  Negative for difficulty urinating. Musculoskeletal:  Positive for back pain. Negative for arthralgias. Skin:  Negative for color change and wound. Neurological:  Negative for dizziness, syncope, speech difficulty and headaches. Hematological:  Negative for adenopathy. Psychiatric/Behavioral:  Negative for agitation and behavioral problems. Current Problem List:     Patient Active Problem List   Diagnosis   • Chronic cough   • Chronic migraine without aura   • Hypercholesterolemia   • Benign familial tremor   • Bipolar affective disorder, currently depressed, moderate (HCC)   • Persistent insomnia of non-organic origin   • Astigmatism of right eye   • Migraine with aura and without status migrainosus, not intractable   • Intractable chronic migraine without aura and without status migrainosus   • Chronic migraine without aura without status migrainosus, not intractable       Allergies:     No Known Allergies    Current Medications:       Current Outpatient Medications:   •  Aimovig 140 MG/ML SOAJ, INJECT 140 MG UNDER THE SKIN EVERY 30 DAYS, Disp: 1 mL, Rfl: 11  •  atorvastatin (LIPITOR) 20 mg tablet, TAKE ONE TABLET BY MOUTH EVERY DAY, Disp: 90 tablet, Rfl: 1  •  gabapentin (NEURONTIN) 300 mg capsule, Take 1 capsule (300 mg total) by mouth 3 (three) times a day, Disp: 90 capsule, Rfl: 5  •  Nerve Stimulator (Cefaly Electrode) MISC, Apply once daily prn migraine for 20 minutes. , Disp: 1 each, Rfl: 0  •  rimegepant sulfate (Nurtec) 75 mg TBDP, Take 1 tablet (75 mg total) by mouth once as needed (migraine) for up to 1 dose, Disp: 8 tablet, Rfl: 1  •  verapamil (VERELAN PM) 120 MG 24 hr capsule, Take 1 capsule (120 mg total) by mouth daily at bedtime, Disp: 30 capsule, Rfl: 4    Current Facility-Administered Medications:   •  Botulinum Toxin Type A SOLR 200 Units, 200 Units, Injection, Q3 Months, Eulalio De Los Santos PA-C, 200 Units at 07/15/22 3112  •  Botulinum Toxin Type A SOLR 200 Units, 200 Units, Injection, Q3 Months, Alena Holden MD, 200 Units at 10/18/22 1059  •  Botulinum Toxin Type A SOLR 200 Units, 200 Units, Injection, Q3 Months, Alena Holden MD, 200 Units at 01/18/23 1545  •  Botulinum Toxin Type A SOLR 200 Units, 200 Units, Injection, Q3 Months, Alena Holden MD, 200 Units at 04/19/23 1658  •  Botulinum Toxin Type A SOLR 200 Units, 200 Units, Injection, Q3 Months, Alena Holden MD, 200 Units at 07/25/23 1311  •  Botulinum Toxin Type A SOLR 200 Units, 200 Units, Injection, Q3 Months, Chon Lara MD, 200 Units at 11/01/23 1300    Past Medical History:       Past Medical History:   Diagnosis Date   • Chronic migraine without aura without status migrainosus, not intractable 8/6/2021   • Cubital tunnel syndrome     last assessed 1/6/14   • Depression 11/07/2005   • Hyperlipidemia    • Hypertension    • Migraine    • Psychiatric disorder         Past Surgical History:   Procedure Laterality Date   • ELBOW ARTHROPLASTY Bilateral     right elbow reconstruction surgery and transposition surgery of the left elbow   • KNEE ARTHROCENTESIS     • SHOULDER ARTHROPLASTY     • SHOULDER SURGERY  12/06/2018    AC Joint- Left   • SINUS SURGERY      with bone spur removed in uvulectomy done. Had [facility] at the time, Dr. Tessie Stevens ENT   • TONSILLECTOMY      tonsils removed and also removal of uvula, then a few years ago by Dr. Tessie Stevens. At that time he was having frequent sinus and throat infections, last assessed 12/19/16        Family History   Problem Relation Age of Onset   • Migraines Mother    • Depression Mother    • Alzheimer's disease Mother    • No Known Problems Father    • Arthritis Family    • Breast cancer Family • Heart disease Family         cardiac disorder   • Hypertension Family    • Breast cancer Sister    • Asperger's syndrome Son         Social History     Socioeconomic History   • Marital status:      Spouse name: Not on file   • Number of children: Not on file   • Years of education: Not on file   • Highest education level: Not on file   Occupational History   • 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 [employer]   Tobacco Use   • Smoking status: Never   • Smokeless tobacco: Never   • Tobacco comments:     never smoked   Vaping Use   • Vaping Use: Never used   Substance and Sexual Activity   • Alcohol use: Not Currently     Comment: rare   • Drug use: No     Comment: social per [employer]   • Sexual activity: Yes     Partners: Female   Other Topics Concern   • Not on file   Social History Narrative    Daily tea consumption     per [employer]     Social Determinants of Health     Financial Resource Strain: Not on file   Food Insecurity: Not on file   Transportation Needs: Not on file   Physical Activity: Not on file   Stress: Not on file   Social Connections: Not on file   Intimate Partner Violence: Not on file   Housing Stability: Not on file        Physical Exam:     /80   Pulse 88   Temp 98.4 °F (36.9 °C)   Resp 18   Ht 5' 8" (1.727 m)   Wt 84.4 kg (186 lb)   BMI 28.28 kg/m²     Physical Exam  Vitals and nursing note reviewed. Constitutional:       General: He is not in acute distress. Appearance: He is well-developed. He is not diaphoretic. HENT:      Head: Normocephalic and atraumatic. Right Ear: External ear normal.      Left Ear: External ear normal.      Nose: Nose normal.      Mouth/Throat:      Pharynx: No oropharyngeal exudate. Eyes:      General: No scleral icterus. Right eye: No discharge. Left eye: No discharge. Pupils: Pupils are equal, round, and reactive to light. Neck:      Thyroid: No thyromegaly. Cardiovascular:      Rate and Rhythm: Normal rate. Heart sounds: Normal heart sounds. No murmur heard. Pulmonary:      Effort: Pulmonary effort is normal. No respiratory distress. Breath sounds: Normal breath sounds. No wheezing. Abdominal:      General: Bowel sounds are normal. There is no distension. Palpations: Abdomen is soft. There is no mass. Tenderness: There is no abdominal tenderness. There is no guarding or rebound. Musculoskeletal:         General: Normal range of motion. Comments: Dec lumbar lordosis    PVMS l spine   Skin:     General: Skin is warm and dry. Findings: No erythema or rash. Neurological:      Mental Status: He is alert. Coordination: Coordination normal.      Deep Tendon Reflexes: Reflexes normal.   Psychiatric:         Behavior: Behavior normal.          Data:     Pre-operative work-up    Laboratory Results: I have personally reviewed the pertinent laboratory results/reports  and labs are reported as non fasting as such they are acceptable. EKG: I have personally reviewed pertinent films in PACS and NRS no signs of ischemia    No results found for this or any previous visit (from the past 672 hour(s)). Assessment & Recommendations:     Problem List Items Addressed This Visit        Other    Hypercholesterolemia   Other Visit Diagnoses     Lumbar radiculopathy, chronic    -  Primary          Pre-Op Evaluation Assessment  47 y.o. male with planned surgery: Lumbar surgery. Known risk factors for perioperative complications: None. Current medications which may produce withdrawal symptoms if withheld perioperatively: none    Pre-Op Evaluation Plan  1. Further preoperative workup as follows:   - None; no further preoperative work-up is required    2. Medication Management/Recommendations:   - Patient has been instructed to avoid herbs or non-directed vitamins the week prior to surgery to ensure no drug interactions with perioperative surgical and anesthetic medications.   - Patient has been instructed to avoid aspirin containing medications or non-steroidal anti-inflammatory drugs for the week preceding surgery. Gabapentin ok to take am surgery with sip of water  Skin lipitor    3. Prophylaxis for cardiac events with perioperative beta-blockers: not indicated. 4. Patient requires further consultation with: None    Clearance  Patient is CLEARED for surgery without any additional cardiac testing.      Judi Esparza, [address] [address]  [address] [place] [address]  Phone#  [phone]  Fax#  [phone]

## 2023-11-01 NOTE — PROGRESS NOTES
Universal Protocol   Consent: Written consent obtained. Consent given by: patient  Time out: Immediately prior to procedure a "time out" was called to verify the correct patient, procedure, equipment, support staff and site/side marked as required. Timeout called at: 11/1/2023 12:18 PM.      Chemodenervation     Date/Time  11/1/2023 12:00 PM     Performed by  Sheree Guthrie MD   Authorized by  Sheree Guthrie MD     Pre-procedure details      Prepped With: Alcohol     Anesthesia  (see MAR for exact dosages): Anesthesia method:  None   Procedure details      Position:  Upright   Botox      Botox Type:  Type A    Brand:  Botox    Final Concentration per CC:  50 units    Needle Gauge:  30 G 2.5 inch   Procedures      Botox Procedures: chronic headache      Indications: migraines     Post-procedure details      Chemodenervation:  Chronic migraine    Facial Nerve Location[de-identified]  Bilateral facial nerve    Patient tolerance of procedure: Tolerated well, no immediate complications     10 units, 2 sites unit(s) was injected into the procerus muscle. 5 unit(s) was injected into the  right  muscle. 5 unit(s) was injected into the  left  muscle. 10 unit(s) was injected into the  right frontalis muscle. 10 unit(s) was injected into the  left frontalis muscle. 40 unit(s) was injected into the  right temporalis muscle. 40 u, 7 sites unit(s) was injected into the  left temporalis muscle. 15 unit(s) was injected into the  right occipitalis muscle. 15 unit(s) was injected into the  left occipitalis muscle. 10 unit(s) was injected into the  right cervical paraspinal muscle. 10 unit(s) was injected into the  left cervical paraspinal muscle. 15 unit(s) was injected into the  right trapezius muscle. 15 unit(s) was injected into the  left trapezius muscle. A total of 200units were used. A total of 0units were discarded. Diagnosis ICD-10-CM Associated Orders   1. Intractable chronic migraine without aura and without status migrainosus  G43.719 Botulinum Toxin Type A SOLR 200 Units     Chemodenervation

## 2023-11-01 NOTE — TELEPHONE ENCOUNTER
Please submit for botox approval previous boxtox approval :    Valid:2022-      Next botox 24    OVS 24

## 2023-12-17 DIAGNOSIS — G43.709 CHRONIC MIGRAINE WITHOUT AURA WITHOUT STATUS MIGRAINOSUS, NOT INTRACTABLE: ICD-10-CM

## 2023-12-17 DIAGNOSIS — E78.00 HYPERCHOLESTEROLEMIA: ICD-10-CM

## 2023-12-18 RX ORDER — ATORVASTATIN CALCIUM 20 MG/1
TABLET, FILM COATED ORAL
Qty: 90 TABLET | Refills: 1 | Status: SHIPPED | OUTPATIENT
Start: 2023-12-18

## 2023-12-18 RX ORDER — ERENUMAB-AOOE 140 MG/ML
INJECTION, SOLUTION SUBCUTANEOUS
Qty: 1 ML | Refills: 11 | Status: SHIPPED | OUTPATIENT
Start: 2023-12-18

## 2023-12-20 ENCOUNTER — TELEPHONE (OUTPATIENT)
Dept: NEUROLOGY | Facility: CLINIC | Age: 53
End: 2023-12-20

## 2023-12-29 LAB — HBA1C MFR BLD HPLC: 5.5 %

## 2024-01-02 ENCOUNTER — TELEPHONE (OUTPATIENT)
Dept: FAMILY MEDICINE CLINIC | Facility: CLINIC | Age: 54
End: 2024-01-02

## 2024-01-02 ENCOUNTER — CONSULT (OUTPATIENT)
Dept: FAMILY MEDICINE CLINIC | Facility: CLINIC | Age: 54
End: 2024-01-02
Payer: OTHER MISCELLANEOUS

## 2024-01-02 VITALS
HEIGHT: 68 IN | TEMPERATURE: 99.2 F | BODY MASS INDEX: 28.95 KG/M2 | RESPIRATION RATE: 17 BRPM | HEART RATE: 69 BPM | DIASTOLIC BLOOD PRESSURE: 80 MMHG | SYSTOLIC BLOOD PRESSURE: 128 MMHG | WEIGHT: 191 LBS

## 2024-01-02 DIAGNOSIS — Z01.818 PRE-OP EXAMINATION: ICD-10-CM

## 2024-01-02 DIAGNOSIS — M54.16 LUMBAR RADICULOPATHY, CHRONIC: Primary | ICD-10-CM

## 2024-01-02 PROCEDURE — 99213 OFFICE O/P EST LOW 20 MIN: CPT | Performed by: FAMILY MEDICINE

## 2024-01-02 NOTE — TELEPHONE ENCOUNTER
[facility] is faxing the patients blood work that he had drawn 12/29/23 for his pre-op. Please keep an eye out in solarity for results.     Haven Gregg LPN

## 2024-01-02 NOTE — PROGRESS NOTES
[facility] PRE-OPERATIVE EVALUATION  [facility]    NAME: Tha Alonso  AGE: 53 y.o. SEX: male  : 1970     DATE: 2024    [facility] Pre-Operative Evaluation      Chief Complaint: Pre-operative Evaluation     Surgery: lumbar decompression  Anticipated Date of Surgery: 24  Surgeon: Dr Rendon      History of Present Illness:     Pt is here for a pre op          Anesthesia:  general  Bleeding Risk: no recent abnormal bleeding, no remote history of abnormal bleeding, and use of Ca-channel blockers (see med list)  Current Anti-platelet/anticoagulation medication: Aspirin and no fish oil    Assessment of Cardiac Risk:  Denies unstable or severe angina or MI in the last 6 weeks or history of stent placement in the last year   Denies decompensated heart failure (e.g. New onset heart failure, NYHA functional class IV heart failure, or worsening existing heart failure)  Denies significant arrhythmias such as high grade AV block, symptomatic ventricular arrhythmia, newly recognized ventricular tachycardia, supraventricular tachycardia with resting heart rate >100, or symptomatic bradycardia  Denies severe heart valve disease including aortic stenosis or symptomatic mitral stenosis     Exercise Capacity:  Able to walk 4 blocks without symptoms?: Yes  Able to walk 2 flights without symptoms?: Yes    Prior Anesthesia Reactions: No     Personal history of venous thromboembolic disease? No    History of steroid use for >2 weeks within last year? No         Review of Systems:     Review of Systems   Constitutional:  Negative for activity change, appetite change, chills, diaphoresis, fatigue, fever and unexpected weight change.   HENT:  Negative for congestion, dental problem, ear pain, mouth sores, sinus pressure, sinus pain, sore throat and trouble swallowing.    Eyes:  Negative for photophobia, discharge and itching.   Respiratory:  Negative for apnea, chest tightness  and shortness of breath.    Cardiovascular:  Negative for chest pain, palpitations and leg swelling.   Gastrointestinal:  Negative for abdominal distention, abdominal pain, blood in stool, nausea and vomiting.   Endocrine: Negative for cold intolerance, heat intolerance, polydipsia, polyphagia and polyuria.   Genitourinary:  Negative for difficulty urinating.   Musculoskeletal:  Positive for back pain. Negative for arthralgias.   Skin:  Negative for color change and wound.   Neurological:  Negative for dizziness, syncope, speech difficulty and headaches.   Hematological:  Negative for adenopathy.   Psychiatric/Behavioral:  Negative for agitation and behavioral problems.        Current Problem List:     Patient Active Problem List   Diagnosis    Chronic cough    Chronic migraine without aura    Hypercholesterolemia    Benign familial tremor    Bipolar affective disorder, currently depressed, moderate (HCC)    Persistent insomnia of non-organic origin    Astigmatism of right eye    Migraine with aura and without status migrainosus, not intractable    Intractable chronic migraine without aura and without status migrainosus    Chronic migraine without aura without status migrainosus, not intractable       Allergies:     No Known Allergies    Current Medications:       Current Outpatient Medications:     Aimovig 140 MG/ML SOAJ, INJECT 140MG UNDER THE SKIN EVERY 30 DAYS, Disp: 1 mL, Rfl: 11    atorvastatin (LIPITOR) 20 mg tablet, TAKE ONE TABLET BY MOUTH EVERY DAY, Disp: 90 tablet, Rfl: 1    gabapentin (NEURONTIN) 300 mg capsule, Take 1 capsule (300 mg total) by mouth 3 (three) times a day, Disp: 90 capsule, Rfl: 5    Nerve Stimulator (Cefaly Electrode) MISC, Apply once daily prn migraine for 20 minutes., Disp: 1 each, Rfl: 0    rimegepant sulfate (Nurtec) 75 mg TBDP, Take 1 tablet (75 mg total) by mouth once as needed (migraine) for up to 1 dose, Disp: 8 tablet, Rfl: 1    verapamil (VERELAN PM) 120 MG 24 hr capsule, Take  1 capsule (120 mg total) by mouth daily at bedtime, Disp: 30 capsule, Rfl: 4    Current Facility-Administered Medications:     Botulinum Toxin Type A SOLR 200 Units, 200 Units, Injection, Q3 Months, Nneka Menard PA-C, 200 Units at 07/15/22 0934    Botulinum Toxin Type A SOLR 200 Units, 200 Units, Injection, Q3 Months, Alena Holden MD, 200 Units at 10/18/22 1059    Botulinum Toxin Type A SOLR 200 Units, 200 Units, Injection, Q3 Months, Alena Holden MD, 200 Units at 01/18/23 1545    Botulinum Toxin Type A SOLR 200 Units, 200 Units, Injection, Q3 Months, Alena Holden MD, 200 Units at 04/19/23 1658    Botulinum Toxin Type A SOLR 200 Units, 200 Units, Injection, Q3 Months, Alena Holden MD, 200 Units at 07/25/23 1311    Botulinum Toxin Type A SOLR 200 Units, 200 Units, Injection, Q3 Months, Alena Holden MD, 200 Units at 11/01/23 1300    Past Medical History:       Past Medical History:   Diagnosis Date    Chronic migraine without aura without status migrainosus, not intractable 8/6/2021    Cubital tunnel syndrome     last assessed 1/6/14    Depression 11/07/2005    Hyperlipidemia     Hypertension     Migraine     Psychiatric disorder         Past Surgical History:   Procedure Laterality Date    ELBOW ARTHROPLASTY Bilateral     right elbow reconstruction surgery and transposition surgery of the left elbow    KNEE ARTHROCENTESIS      SHOULDER ARTHROPLASTY      SHOULDER SURGERY  12/06/2018    AC Joint- Left    SINUS SURGERY      with bone spur removed in uvulectomy done. Had [facility] at the time, Dr. Tapia ENT    TONSILLECTOMY      tonsils removed and also removal of uvula, then a few years ago by Dr. Tapia.At that time he was having frequent sinus and throat infections, last assessed 12/19/16        Family History   Problem Relation Age of Onset    Migraines Mother     Depression Mother     Alzheimer's disease Mother     No Known Problems Father     Arthritis Family     Breast cancer Family     Heart disease Family  "        cardiac disorder    Hypertension Family     Breast cancer Sister     Asperger's syndrome Son         Social History     Socioeconomic History    Marital status:      Spouse name: Not on file    Number of children: Not on file    Years of education: Not on file    Highest education level: Not on file   Occupational History    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 [employer]   Tobacco Use    Smoking status: Never    Smokeless tobacco: Never    Tobacco comments:     never smoked   Vaping Use    Vaping status: Never Used   Substance and Sexual Activity    Alcohol use: Not Currently     Comment: rare    Drug use: No     Comment: social per [employer]    Sexual activity: Yes     Partners: Female   Other Topics Concern    Not on file   Social History Narrative    Daily tea consumption     per [employer]     Social Determinants of Health     Financial Resource Strain: Not on file   Food Insecurity: Not on file   Transportation Needs: Not on file   Physical Activity: Not on file   Stress: Not on file   Social Connections: Not on file   Intimate Partner Violence: Not on file   Housing Stability: Not on file        Physical Exam:     /80   Pulse 69   Temp 99.2 °F (37.3 °C)   Resp 17   Ht 5' 8\" (1.727 m)   Wt 86.6 kg (191 lb)   BMI 29.04 kg/m²     Physical Exam  Vitals and nursing note reviewed.   Constitutional:       General: He is not in acute distress.     Appearance: He is well-developed. He is not diaphoretic.   HENT:      Head: Normocephalic and atraumatic.      Right Ear: External ear normal.      Left Ear: External ear normal.      Nose: Nose normal.      Mouth/Throat:      Pharynx: No oropharyngeal exudate.   Eyes:      General: No scleral icterus.        Right eye: No discharge.         Left eye: No discharge.      Pupils: Pupils are equal, round, and reactive to light.   Neck:      Thyroid: No thyromegaly.   Cardiovascular:      Rate and Rhythm: Normal rate.      Heart sounds: Normal heart " sounds. No murmur heard.  Pulmonary:      Effort: Pulmonary effort is normal. No respiratory distress.      Breath sounds: Normal breath sounds. No wheezing.   Abdominal:      General: Bowel sounds are normal. There is no distension.      Palpations: Abdomen is soft. There is no mass.      Tenderness: There is no abdominal tenderness. There is no guarding or rebound.   Musculoskeletal:         General: Normal range of motion.      Comments: Dec lumar lordosis   Skin:     General: Skin is warm and dry.      Findings: No erythema or rash.   Neurological:      Mental Status: He is alert.      Coordination: Coordination normal.      Deep Tendon Reflexes: Reflexes normal.   Psychiatric:         Behavior: Behavior normal.          Data:     Pre-operative work-up    Laboratory Results:  pt had labs at [facility] - will need to acquire     EKG: I have personally reviewed pertinent films in PACS and EKG - NSR no signs of ischemia    No results found for this or any previous visit (from the past 672 hour(s)).        Assessment & Recommendations:     Lumbar decompression      Pre-Op Evaluation Assessment  53 y.o. male with planned surgery: Lumbar decompression.    Known risk factors for perioperative complications: None.        Current medications which may produce withdrawal symptoms if withheld perioperatively: none.    Pre-Op Evaluation Plan  1. Further preoperative workup as follows:   - None; no further preoperative work-up is required    2. Medication Management/Recommendations:   - Patient has been instructed to avoid herbs or non-directed vitamins the week prior to surgery to ensure no drug interactions with perioperative surgical and anesthetic medications.  - Patient has been instructed to avoid aspirin containing medications or non-steroidal anti-inflammatory drugs for the week preceding surgery.  Gabapentin - Skip am dose  Lipitor - skip am dose  Nutec - skip am of surgery  Verapaim - ok to take with a sip of  water  3. Prophylaxis for cardiac events with perioperative beta-blockers: not indicated.    4. Patient requires further consultation with: None    Clearance  Pending labs coming back.      Labs are back - Pt is clear for surgery     [facility]  [address]  [address]  [facility] [address]  Phone#  [phone]  Fax#  [phone]

## 2024-01-03 ENCOUNTER — OFFICE VISIT (OUTPATIENT)
Dept: NEUROLOGY | Facility: CLINIC | Age: 54
End: 2024-01-03
Payer: COMMERCIAL

## 2024-01-03 ENCOUNTER — TELEPHONE (OUTPATIENT)
Dept: FAMILY MEDICINE CLINIC | Facility: CLINIC | Age: 54
End: 2024-01-03

## 2024-01-03 VITALS
WEIGHT: 190 LBS | HEART RATE: 77 BPM | BODY MASS INDEX: 28.14 KG/M2 | SYSTOLIC BLOOD PRESSURE: 130 MMHG | HEIGHT: 69 IN | DIASTOLIC BLOOD PRESSURE: 72 MMHG

## 2024-01-03 DIAGNOSIS — G43.709 CHRONIC MIGRAINE WITHOUT AURA: Primary | ICD-10-CM

## 2024-01-03 PROCEDURE — 99214 OFFICE O/P EST MOD 30 MIN: CPT | Performed by: PSYCHIATRY & NEUROLOGY

## 2024-01-03 NOTE — PROGRESS NOTES
Return NeuroOutpatient Note        Tha Alonso  [id number]  53 y.o.  1970       Migraine        History obtained from:  Patient     HPI/Subjective:    Tha Alonso is a 52 yo M with PMH of chronic migraines presents as f/u.  Patient has been getting migraines since age of 10. He had reported migraine frequency of 8+ a month. He had concern for sob so wanted to hold off with botox which he did for about a year. He restarted botox in Aug of 2020. Patient gets botox every 3 months. Frequency of his migraines is 3-4 a month.   He's also on aimovig monthly. He also uses Cefaly nerve stimulator. It helps to some extent.  He's on verapamil 120mg daily for prevention as well. He's on Nurtec prn.     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      Over the years, he had been relatively well controlled with migraines all these years until recently.      His father  of leukemia but he had it later in age.       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 [employer]   Tobacco Use   • Smoking status: Never   • Smokeless tobacco: Never   • Tobacco comments:     never smoked   Vaping Use   • Vaping status: Never Used   Substance and Sexual Activity   • Alcohol use: Not Currently     Comment: rare   • Drug use: No     Comment: social per [employer]   • Sexual activity: Yes     Partners: Female   Other Topics Concern   • Not on file   Social History Narrative    Daily tea consumption     per  [employer]     Social Determinants of Health     Financial Resource Strain: Not on file   Food Insecurity: Not on file   Transportation Needs: Not on file   Physical Activity: Not on file   Stress: Not on file   Social Connections: Not on file   Intimate Partner Violence: Not on file   Housing Stability: Not on file     Family History   Problem Relation Age of Onset   • Migraines Mother    • Depression Mother    • Alzheimer's disease Mother    • No Known Problems Father    • Arthritis Family    • Breast cancer Family    • Heart disease Family         cardiac disorder   • Hypertension Family    • Breast cancer Sister    • Asperger's syndrome Son      No Known Allergies  Current Outpatient Medications on File Prior to Visit   Medication Sig Dispense Refill   • Aimovig 140 MG/ML SOAJ INJECT 140MG UNDER THE SKIN EVERY 30 DAYS 1 mL 11   • atorvastatin (LIPITOR) 20 mg tablet TAKE ONE TABLET BY MOUTH EVERY DAY 90 tablet 1   • gabapentin (NEURONTIN) 300 mg capsule Take 1 capsule (300 mg total) by mouth 3 (three) times a day 90 capsule 5   • Nerve Stimulator (Cefaly Electrode) MISC Apply once daily prn migraine for 20 minutes. 1 each 0   • rimegepant sulfate (Nurtec) 75 mg TBDP Take 1 tablet (75 mg total) by mouth once as needed (migraine) for up to 1 dose 8 tablet 1   • verapamil (VERELAN PM) 120 MG 24 hr capsule Take 1 capsule (120 mg total) by mouth daily at bedtime 30 capsule 4     Current Facility-Administered Medications on File Prior to Visit   Medication Dose Route Frequency Provider Last Rate Last Admin   • Botulinum Toxin Type A SOLR 200 Units  200 Units Injection Q3 Months Nneka Menard PA-C   200 Units at 07/15/22 0934   • Botulinum Toxin Type A SOLR 200 Units  200 Units Injection Q3 Months Alena Holden MD   200 Units at 10/18/22 1059   • Botulinum Toxin Type A SOLR 200 Units  200 Units Injection Q3 Months Alena Holden MD   200 Units at 01/18/23 1545   • Botulinum Toxin Type A SOLR 200 Units  200 Units  "Injection Q3 Months Alena Holden MD   200 Units at 04/19/23 1658   • Botulinum Toxin Type A SOLR 200 Units  200 Units Injection Q3 Months Alena Holden MD   200 Units at 07/25/23 1311   • Botulinum Toxin Type A SOLR 200 Units  200 Units Injection Q3 Months Alena Holden MD   200 Units at 11/01/23 1300         Review of Systems   Refer to positive review of systems in HPI.   Review of Systems    Constitutional- No fever  Eyes- No visual change  ENT- Hearing normal  CV- No chest pain  Resp- No Shortness of breath  GI- No diarrhea  - Bladder normal  MS- No Arthritis   Skin- No rash  Psych- No depression  Endo- No DM  Heme- No nodes    Vitals:    01/03/24 1203   BP: 130/72   BP Location: Left arm   Patient Position: Sitting   Cuff Size: Large   Pulse: 77   Weight: 86.2 kg (190 lb)   Height: 5' 9\" (1.753 m)       PHYSICAL EXAM:  Appearance: No Acute Distress  Ophthalmoscopic: Disc Flat, Normal fundus  Mental status:  Orientation: Awake, Alert, and Orientedx3  Memory: Registation 3/3 Recall 3/3  Attention: normal  Knowledge: good  Language: No aphasia  Speech: No dysarthria  Cranial Nerves:  2 No Visual Defect on Confrontation, Pupils round, equal, reactive to light  3,4,6 Extraocular Movements Intact, no nystagmus  5 Facial Sensation Intact  7 No facial asymmetry  8 Intact hearing  9,10 Palate symmetric, normal gag  11 Good shoulder shrug  12 Tongue Midline  Gait: Stable  Coordination: No ataxia with finger to nose testing, and heel to shin  Sensory: Intact, Symmetric to pinprick, light touch, vibration, and joint position  Muscle Tone: Normal              Muscle exam:  Arm Right Left Leg Right Left   Deltoid 5/5 5/5 Iliopsoas 5/5 5/5   Biceps 5/5 5/5 Quads 5/5 5/5   Triceps 5/5 5/5 Hamstrings 5/5 5/5   Wrist Extension 5/5 5/5 Ankle Dorsi Flexion 5/5 5/5   Wrist Flexion 5/5 5/5 Ankle Plantar Flexion 5/5 5/5   Interossei 5/5 5/5 Ankle Eversion 5/5 5/5   APB 5/5 5/5 Ankle Inversion 5/5 5/5       Reflexes   RJ BJ TJ KJ " AJ Plantars Menard's   Right 2+ 2+ 2+ 2+  Downgoing Not present   Left 2+ 2+ 2+ 2+  Downgoing Not present     Personal review of  Labs:                  Diagnoses and all orders for this visit:      1. Chronic migraine without aura            Patient is doing well as long as he's on botox every 3 months and monthly aimovig.  He is to resume verapamil 120mg daily.  He may resume prn nurtec.                Total time of encounter:  30 min  More than 50% of the time was used in counseling and/or coordination of care  Extent of counseling and/or coordination of care        Alena Holden MD  [facility]  [address] [place] [id number] [phone]

## 2024-01-03 NOTE — TELEPHONE ENCOUNTER
----- Message from Frank Lombardi, DO sent at 1/3/2024  8:39 AM EST -----  Regarding: Surg Clearance  I reviewed labs and addended the Pre op note, can we fax the note from yesterday to surgeon  Thank You    Dr HINOJOSA

## 2024-01-03 NOTE — TELEPHONE ENCOUNTER
Blood work scans directly into chart, does not get printed at the office level.  Received, no further action required.

## 2024-01-18 ENCOUNTER — TELEPHONE (OUTPATIENT)
Dept: NEUROLOGY | Facility: CLINIC | Age: 54
End: 2024-01-18

## 2024-01-18 NOTE — TELEPHONE ENCOUNTER
Called pt's insurance, spoke with rep, Cody, to initiate bonny castillo request. Cody provided the following details:    Pending Auth:    Approved     Botox 200 UNITS    Qty. 1    Pending Auth# [id number] fax: [phone]    Valid:1/18/2024-1/16/2025    Visits: 4     Please use stock

## 2024-01-22 ENCOUNTER — TELEPHONE (OUTPATIENT)
Dept: NEUROLOGY | Facility: CLINIC | Age: 54
End: 2024-01-22

## 2024-01-25 ENCOUNTER — TELEPHONE (OUTPATIENT)
Dept: NEUROLOGY | Facility: CLINIC | Age: 54
End: 2024-01-25

## 2024-02-08 ENCOUNTER — PROCEDURE VISIT (OUTPATIENT)
Dept: NEUROLOGY | Facility: CLINIC | Age: 54
End: 2024-02-08
Payer: COMMERCIAL

## 2024-02-08 VITALS
TEMPERATURE: 97 F | DIASTOLIC BLOOD PRESSURE: 80 MMHG | HEART RATE: 73 BPM | OXYGEN SATURATION: 98 % | HEIGHT: 69 IN | WEIGHT: 182 LBS | SYSTOLIC BLOOD PRESSURE: 110 MMHG | BODY MASS INDEX: 26.96 KG/M2

## 2024-02-08 DIAGNOSIS — G43.719 INTRACTABLE CHRONIC MIGRAINE WITHOUT AURA AND WITHOUT STATUS MIGRAINOSUS: Primary | ICD-10-CM

## 2024-02-08 PROCEDURE — 64615 CHEMODENERV MUSC MIGRAINE: CPT | Performed by: PSYCHIATRY & NEUROLOGY

## 2024-02-08 NOTE — PROGRESS NOTES
"Universal Protocol   Consent: Written consent obtained.  Consent given by: patient  Time out: Immediately prior to procedure a \"time out\" was called to verify the correct patient, procedure, equipment, support staff and site/side marked as required.  Timeout called at: 2/8/2024 11:40 AM.      Chemodenervation     Date/Time  2/8/2024 11:30 AM     Performed by  Alena Holden MD   Authorized by  Alena Holden MD     Pre-procedure details      Prepped With: Alcohol     Anesthesia  (see MAR for exact dosages):     Anesthesia method:  None   Procedure details      Position:  Upright   Botox      Botox Type:  Type A    Brand:  Botox    Final Concentration per CC:  50 units    Needle Gauge:  30 G 2.5 inch   Procedures      Botox Procedures: chronic headache      Indications: migraines     Post-procedure details      Chemodenervation:  Chronic migraine    Facial Nerve Location::  Bilateral facial nerve    Patient tolerance of procedure:  Tolerated well, no immediate complications       10 units, 2 sites unit(s) was injected into the procerus muscle.    5 unit(s) was injected into the  right  muscle.    5 unit(s) was injected into the  left  muscle.    10 unit(s) was injected into the  right frontalis muscle.    10 unit(s) was injected into the  left frontalis muscle.    40 unit(s) was injected into the  right temporalis muscle.    40 u, 7 sites unit(s) was injected into the  left temporalis muscle.    15 unit(s) was injected into the  right occipitalis muscle.    15 unit(s) was injected into the  left occipitalis muscle.    10 unit(s) was injected into the  right cervical paraspinal muscle.    10 unit(s) was injected into the  left cervical paraspinal muscle.    15 unit(s) was injected into the  right trapezius muscle.    15 unit(s) was injected into the  left trapezius muscle.       A total of 200units were used. A total of 0units were discarded.       Diagnosis ICD-10-CM Associated Orders   1. " Intractable chronic migraine without aura and without status migrainosus  G43.719 Botulinum Toxin Type A SOLR 200 Units     Chemodenervation

## 2024-03-27 ENCOUNTER — PROCEDURE VISIT (OUTPATIENT)
Dept: FAMILY MEDICINE CLINIC | Facility: CLINIC | Age: 54
End: 2024-03-27
Payer: COMMERCIAL

## 2024-03-27 DIAGNOSIS — G43.709 CHRONIC MIGRAINE WITHOUT AURA WITHOUT STATUS MIGRAINOSUS, NOT INTRACTABLE: Primary | ICD-10-CM

## 2024-03-27 DIAGNOSIS — M99.00 SOMATIC DYSFUNCTION OF HEAD REGION: ICD-10-CM

## 2024-03-27 DIAGNOSIS — M99.01 SOMATIC DYSFUNCTION OF CERVICAL REGION: ICD-10-CM

## 2024-03-27 PROCEDURE — 98926 OSTEOPATH MANJ 3-4 REGIONS: CPT

## 2024-03-27 NOTE — PROGRESS NOTES
The Assessment/Plan     This is a 54 y.o. male who presents for OMT follow-up for:  1. Chronic migraine without aura without status migrainosus, not intractable  OMT      2. Somatic dysfunction of cervical region  OMT      3. Somatic dysfunction of head region  OMT           1. Patient tolerated OMT well for the above problems,  advised patient to drink fluids and can use NSAID for soreness after treatment     2. OMT Follow up in 2 weeks.    Harper Alonso is a 54 y.o. male and is here for a OMT follow up. The patient reports daily headaches. He reports that he currently has a frontal headache that is mild compared to his usual headaches. He does not notice the headaches on one side more than the other. Bright lights and some smells are triggers. He can sometimes sense when a bad headache is coming on before they occur, but no visual aura.   He currently is on botox every 3 months, aimovig monthly, verapamil 120 daily, and PRN nurtec. He also has a nerve stimulator device in place.      Is the patient taking Pain medication? no  Has the patient completed physical therapy for this condition? no  Did Patient symptoms improve from last OMT appointment? First OMT visit    The following portions of the patient's history were reviewed and updated as appropriate: allergies, current medications, past family history, past medical history, past social history, past surgical history, and problem list.    Review of Systems  Review of Systems   Constitutional:  Negative for fever.   Musculoskeletal:  Negative for back pain, myalgias and neck pain.   Skin:  Negative for rash and wound.   Neurological:  Positive for headaches. Negative for syncope and light-headedness.       Objective     OMT Exam     OMT    Performed by: Bouchra Macdonald DO  Authorized by: Bouchra Macdonald DO  Universal Protocol:  Procedure performed by:  Consent: Verbal consent obtained.  Risks and benefits: risks, benefits and alternatives  were discussed  Consent given by: patient      Procedure Details:     Region evaluated and treated:  Head and Cervical    Head Details:     Examination Method:  Tissue Texture Change, Stability, Laxity, Effusions, Tone, Asymmetry, Misalignment, Crepitation, Defects, Masses and Range of Motion, Contracture    Severity:  Moderate    Treatment Method:  Soft Tissue Treatment, Myofascial Release Treatment, Muscle Energy Treatment and Counterstrain Treatment    Response:  Improved - The somatic dysfunction is improved but not completely resolved.    Cervical Details:     Examination Method:  Tissue Texture Change, Stability, Laxity, Effusions, Tone, Asymmetry, Misalignment, Crepitation, Defects, Masses, Range of Motion, Contracture and Tenderness, Pain    Severity:  Moderate    Osteopathic Findings:  Hypertonic trap L>R  Restricted in rotation left   C3 counterstrain    Treatment Method:  Counterstrain Treatment, Myofascial Release Treatment, Ligamentous Articular Strain, Balanced Ligamentous Tension Treatment and Muscle Energy Treatment    Total Regions Treated:  2  Attending provider present in exam room for procedure: Yes

## 2024-03-27 NOTE — PROGRESS NOTES
Assessment/Plan     This is a 54 y.o. male who presents for OMT visit for:  Migraine Headaches  Somatic dysfunction of Head Region  Somatic dysfunction of cervical region  Somatic dysfunction of  region    Plan:   1. Patient tolerated OMT well for the above problems,  advised patient to drink fluids and can use NSAID for soreness after treatment     2. The patient is enrolled in the OMT Migraine Study, this is study visit # 1. Consent has been signed. Next OMT Follow up in in 2 weeks.    3. initial MIDAS Score 39, HIT 6 Score 62, Headache Days in last 3 months 15, pain score: 9       Harper Alonso is a 54 y.o. male and is here for a OMT follow up for Migraine headaches. The patient reports  The patient reports daily headaches. He reports that he currently has a frontal headache that is mild compared to his usual headaches. He does not notice the headaches on one side more than the other. Bright lights and some smells are triggers. He can sometimes sense when a bad headache is coming on before they occur, but no visual aura.   He currently is on botox every 3 months, aimovig monthly, verapamil 120 daily, and PRN nurtec. He also has a nerve stimulator device in place.        Review of Systems  Neuro: positive migraines.   MSK: as noted in HPI.     Objective     OMT Exam   OMT Exam  Head:   Somatic Dysfunction:  Tissue Texture Changes  Severity :  2  Osteopathic Findings: Decreased CRI. Fascial restriction of forehead.   Treatment Method: MFR and cranial CV4 and OA release   Response: improved  Cervical:   Somatic Dysfunction:  Tissue Texture Changes, Restriction, and Tenderness  Severity :  2  Osteopathic Findings: Tender points were identified and treated as needed with CS. Paraspinal muscle hypertonicity. Restricted ROM of cervical spine  Treatment Method: ME, ST, and CS  Response: improved  Thoracic T1-4:   Somatic Dysfunction:  Tissue Texture Changes  Severity :  2  Osteopathic Findings: Hypertonicity  of mid trapezius muscle   Treatment Method: ME and ST  Response: improved

## 2024-04-08 ENCOUNTER — PROCEDURE VISIT (OUTPATIENT)
Dept: FAMILY MEDICINE CLINIC | Facility: CLINIC | Age: 54
End: 2024-04-08

## 2024-04-08 DIAGNOSIS — G43.709 CHRONIC MIGRAINE WITHOUT AURA WITHOUT STATUS MIGRAINOSUS, NOT INTRACTABLE: Primary | ICD-10-CM

## 2024-04-08 DIAGNOSIS — M99.01 SOMATIC DYSFUNCTION OF CERVICAL REGION: ICD-10-CM

## 2024-04-08 DIAGNOSIS — M99.00 SOMATIC DYSFUNCTION OF HEAD REGION: ICD-10-CM

## 2024-04-08 NOTE — PROGRESS NOTES
Assessment/Plan     This is a 54 y.o. male who presents for OMT visit for:  Migraine Headaches  Somatic dysfunction of Head Region  Somatic dysfunction of cervical region  Somatic dysfunction of  region    Plan:   1. Patient tolerated OMT well for the above problems,  advised patient to drink fluids and can use NSAID for soreness after treatment     2. The patient is enrolled in the OMT Migraine Study, this is study visit # 2. Consent has been signed. Next OMT Follow up in in 2 weeks.        Subjective     Tha Alonso is a 54 y.o. male and is here for a OMT follow up for Migraine headaches. The patient reports he has experienced migraines since he was 10 years old. He typically gets a migraine daily but the severity varies. He uses Aimovig, gets botox injections, and uses neurtec.      Review of Systems  Neuro: positive migraines.   MSK: as noted in HPI.     Objective     OMT Exam   OMT Exam  Head:   Somatic Dysfunction:  Tissue Texture Changes  Severity :  2  Osteopathic Findings: Decreased CRI. Fascial restriction of forehead.   Treatment Method: MFR and cranial CV4 and OA release   Response: improved  Cervical:   Somatic Dysfunction:  Tissue Texture Changes, Restriction, and Tenderness  Severity :  2  Osteopathic Findings: Tender points were identified and treated as needed with CS. Paraspinal muscle hypertonicity. Restricted ROM of cervical spine  Treatment Method: ME, ST, and CS  Response: improved  Thoracic T1-4:   Somatic Dysfunction:  Tissue Texture Changes  Severity :  2  Osteopathic Findings: Hypertonicity of mid trapezius muscle   Treatment Method: ME and ST  Response: improved

## 2024-04-22 ENCOUNTER — PROCEDURE VISIT (OUTPATIENT)
Dept: FAMILY MEDICINE CLINIC | Facility: CLINIC | Age: 54
End: 2024-04-22
Payer: COMMERCIAL

## 2024-04-22 DIAGNOSIS — M99.01 SOMATIC DYSFUNCTION OF CERVICAL REGION: ICD-10-CM

## 2024-04-22 DIAGNOSIS — G43.709 CHRONIC MIGRAINE WITHOUT AURA WITHOUT STATUS MIGRAINOSUS, NOT INTRACTABLE: Primary | ICD-10-CM

## 2024-04-22 DIAGNOSIS — M99.00 SOMATIC DYSFUNCTION OF HEAD REGION: ICD-10-CM

## 2024-04-22 PROCEDURE — 98926 OSTEOPATH MANJ 3-4 REGIONS: CPT | Performed by: FAMILY MEDICINE

## 2024-04-22 NOTE — PROGRESS NOTES
Assessment/Plan     This is a 54 y.o. male who presents for OMT visit for:  Migraine Headaches  Somatic dysfunction of Head Region  Somatic dysfunction of cervical region  Somatic dysfunction of  region    Plan:   1. Patient tolerated OMT well for the above problems,  advised patient to drink fluids and can use NSAID for soreness after treatment     2. The patient is enrolled in the OMT Migraine Study, this is study visit # 3. Consent has been signed. Next OMT Follow up in in 2 weeks.      Subjective     Tha Alonso is a 54 y.o. male and is here for a OMT follow up for Migraine headaches. The patient reports headaches, today is a sinus headache. He states it is different than his migraines. Seasonal allergies have been making his headaches more frequent recently.        Review of Systems  Neuro: positive migraines.   MSK: as noted in HPI.     Objective     OMT Exam   OMT Exam  Head:   Somatic Dysfunction:  Tissue Texture Changes  Severity :  2  Osteopathic Findings: Decreased CRI. Fascial restriction of forehead.   Treatment Method: MFR and cranial CV4 and OA release   Response: improved  Cervical:   Somatic Dysfunction:  Tissue Texture Changes, Restriction, and Tenderness  Severity :  2  Osteopathic Findings: Tender points were identified and treated as needed with CS. Paraspinal muscle hypertonicity. Restricted ROM of cervical spine  Treatment Method: ME, ST, and CS  Response: improved  Thoracic T1-4:   Somatic Dysfunction:  Tissue Texture Changes  Severity :  2  Osteopathic Findings: Hypertonicity of mid trapezius muscle   Treatment Method: ME and ST  Response: improved

## 2024-05-06 ENCOUNTER — TELEPHONE (OUTPATIENT)
Dept: NEUROLOGY | Facility: CLINIC | Age: 54
End: 2024-05-06

## 2024-05-13 ENCOUNTER — PROCEDURE VISIT (OUTPATIENT)
Dept: FAMILY MEDICINE CLINIC | Facility: CLINIC | Age: 54
End: 2024-05-13
Payer: COMMERCIAL

## 2024-05-13 DIAGNOSIS — G43.709 CHRONIC MIGRAINE WITHOUT AURA WITHOUT STATUS MIGRAINOSUS, NOT INTRACTABLE: Primary | ICD-10-CM

## 2024-05-13 DIAGNOSIS — M99.01 SOMATIC DYSFUNCTION OF CERVICAL REGION: ICD-10-CM

## 2024-05-13 DIAGNOSIS — G43.109 MIGRAINE WITH AURA AND WITHOUT STATUS MIGRAINOSUS, NOT INTRACTABLE: ICD-10-CM

## 2024-05-13 DIAGNOSIS — M99.00 SOMATIC DYSFUNCTION OF HEAD REGION: ICD-10-CM

## 2024-05-13 PROCEDURE — 98926 OSTEOPATH MANJ 3-4 REGIONS: CPT | Performed by: FAMILY MEDICINE

## 2024-05-13 NOTE — PROGRESS NOTES
Assessment/Plan     This is a 54 y.o. male who presents for OMT visit for:  Migraine Headaches  Somatic dysfunction of Head Region  Somatic dysfunction of cervical region  Somatic dysfunction of  region    Plan:   1. Patient tolerated OMT well for the above problems,  advised patient to drink fluids and can use NSAID for soreness after treatment     2. The patient is enrolled in the OMT Migraine Study, this is study visit # 4. Consent has been signed. Next OMT Follow up in in 2 weeks.    3. He will follow-up again in 3 weeks.       Subjective     Tha Alonso is a 54 y.o. male and is here for a OMT follow up for Migraine headaches. The patient reports he is still experiencing migraines but is scheduled to receive his botox injection next week. Otherwise, he is doing well and gets some relief from OMT. He currently has a migraine at today's visit.       Review of Systems  Neuro: positive migraines.   MSK: as noted in HPI.     Objective     OMT Exam   OMT Exam  Head:   Somatic Dysfunction:  Tissue Texture Changes  Severity :  2  Osteopathic Findings: Decreased CRI. Fascial restriction of forehead.   Treatment Method: MFR and cranial CV4 and OA release   Response: improved  Cervical:   Somatic Dysfunction:  Tissue Texture Changes, Restriction, and Tenderness  Severity :  2  Osteopathic Findings: Tender points were identified and treated as needed with CS. Paraspinal muscle hypertonicity. Restricted ROM of cervical spine  Treatment Method: ME, ST, and CS  Response: improved  Thoracic T1-4:   Somatic Dysfunction:  Tissue Texture Changes  Severity :  2  Osteopathic Findings: Hypertonicity of mid trapezius muscle   Treatment Method: ME and ST  Response: improved

## 2024-05-20 ENCOUNTER — PROCEDURE VISIT (OUTPATIENT)
Dept: NEUROLOGY | Facility: CLINIC | Age: 54
End: 2024-05-20
Payer: COMMERCIAL

## 2024-05-20 VITALS
TEMPERATURE: 97.6 F | WEIGHT: 184 LBS | OXYGEN SATURATION: 97 % | DIASTOLIC BLOOD PRESSURE: 82 MMHG | HEART RATE: 60 BPM | SYSTOLIC BLOOD PRESSURE: 120 MMHG | BODY MASS INDEX: 27.25 KG/M2 | HEIGHT: 69 IN

## 2024-05-20 DIAGNOSIS — G43.719 INTRACTABLE CHRONIC MIGRAINE WITHOUT AURA AND WITHOUT STATUS MIGRAINOSUS: Primary | ICD-10-CM

## 2024-05-20 PROCEDURE — 64615 CHEMODENERV MUSC MIGRAINE: CPT | Performed by: PSYCHIATRY & NEUROLOGY

## 2024-05-20 NOTE — PROGRESS NOTES
"Universal Protocol   Consent: Written consent obtained.  Consent given by: patient  Time out: Immediately prior to procedure a \"time out\" was called to verify the correct patient, procedure, equipment, support staff and site/side marked as required.  Timeout called at: 5/20/2024 12:13 PM.      Chemodenervation     Date/Time  5/20/2024 12:00 PM     Performed by  Alena Holden MD   Authorized by  Alena Holden MD     Pre-procedure details      Prepped With: Alcohol     Anesthesia  (see MAR for exact dosages):     Anesthesia method:  None   Botox      Botox Type:  Type A    Brand:  Botox    Final Concentration per CC:  50 units    Needle Gauge:  30 G 2.5 inch   Procedures      Botox Procedures: chronic headache      Indications: migraines     Post-procedure details      Chemodenervation:  Chronic migraine    Facial Nerve Location::  Bilateral facial nerve    Patient tolerance of procedure:  Tolerated well, no immediate complications       10 units, 2 sites unit(s) was injected into the procerus muscle.    5 unit(s) was injected into the  right  muscle.    5 unit(s) was injected into the  left  muscle.    10 unit(s) was injected into the  right frontalis muscle.    10 unit(s) was injected into the  left frontalis muscle.    40 unit(s) was injected into the  right temporalis muscle.    40 u, 8 sites unit(s) was injected into the  left temporalis muscle.    15 unit(s) was injected into the  right occipitalis muscle.    15 unit(s) was injected into the  left occipitalis muscle.    10 unit(s) was injected into the  right cervical paraspinal muscle.    10 unit(s) was injected into the  left cervical paraspinal muscle.    15 unit(s) was injected into the  right trapezius muscle.    15 unit(s) was injected into the  left trapezius muscle.       A total of 200units were used. A total of 0units were discarded.       Diagnosis ICD-10-CM Associated Orders   1. Intractable chronic migraine without aura and " without status migrainosus  G43.719 Botulinum Toxin Type A SOLR 200 Units     Chemodenervation

## 2024-06-17 ENCOUNTER — PROCEDURE VISIT (OUTPATIENT)
Dept: FAMILY MEDICINE CLINIC | Facility: CLINIC | Age: 54
End: 2024-06-17

## 2024-06-17 DIAGNOSIS — M99.00 SOMATIC DYSFUNCTION OF HEAD REGION: ICD-10-CM

## 2024-06-17 DIAGNOSIS — M99.01 SOMATIC DYSFUNCTION OF CERVICAL REGION: ICD-10-CM

## 2024-06-17 DIAGNOSIS — G43.709 CHRONIC MIGRAINE WITHOUT AURA WITHOUT STATUS MIGRAINOSUS, NOT INTRACTABLE: Primary | ICD-10-CM

## 2024-06-17 NOTE — PROGRESS NOTES
Assessment & Plan     This is a 54 y.o. male who presents for OMT visit for:  Migraine Headaches  Somatic dysfunction of Head Region  Somatic dysfunction of cervical region  Somatic dysfunction of  region    Plan:   1. Patient tolerated OMT well for the above problems,  advised patient to drink fluids and can use NSAID for soreness after treatment     2. The patient is enrolled in the OMT Migraine Study, this is study visit # 4. Consent has been signed. Next OMT Follow up in as needed for acute illness.    3. follow up MIDAS Score 9, HIT 6 Score 63, Headache Days in last 3 months 6, pain score: 6     Subjective     Tha Alonso is a 54 y.o. male and is here for a OMT follow up for Migraine headaches. The patient reports no change in his migraines since starting the migraine study.          Review of Systems  Neuro: positive migraines.   MSK: as noted in HPI.     Objective     OMT Exam   OMT Exam  Head:   Somatic Dysfunction:  Tissue Texture Changes  Severity :  2  Osteopathic Findings: Decreased CRI. Fascial restriction of forehead.   Treatment Method: MFR and cranial CV4 and OA release   Response: improved  Cervical:   Somatic Dysfunction:  Tissue Texture Changes, Restriction, and Tenderness  Severity :  2  Osteopathic Findings: Tender points were identified and treated as needed with CS. Paraspinal muscle hypertonicity. Restricted ROM of cervical spine  Treatment Method: ME, ST, and CS  Response: improved  Thoracic T1-4:   Somatic Dysfunction:  Tissue Texture Changes  Severity :  2  Osteopathic Findings: Hypertonicity of mid trapezius muscle   Treatment Method: ME and ST  Response: improved

## 2024-07-22 ENCOUNTER — OFFICE VISIT (OUTPATIENT)
Dept: PULMONOLOGY | Facility: MEDICAL CENTER | Age: 54
End: 2024-07-22
Payer: COMMERCIAL

## 2024-07-22 VITALS
OXYGEN SATURATION: 97 % | WEIGHT: 184 LBS | BODY MASS INDEX: 27.25 KG/M2 | DIASTOLIC BLOOD PRESSURE: 80 MMHG | TEMPERATURE: 97.8 F | HEIGHT: 69 IN | RESPIRATION RATE: 16 BRPM | HEART RATE: 83 BPM | SYSTOLIC BLOOD PRESSURE: 112 MMHG

## 2024-07-22 DIAGNOSIS — R05.3 CHRONIC COUGH: Primary | ICD-10-CM

## 2024-07-22 PROCEDURE — 99213 OFFICE O/P EST LOW 20 MIN: CPT | Performed by: STUDENT IN AN ORGANIZED HEALTH CARE EDUCATION/TRAINING PROGRAM

## 2024-07-22 RX ORDER — DICLOFENAC SODIUM 75 MG/1
TABLET, DELAYED RELEASE ORAL EVERY 12 HOURS
COMMUNITY
Start: 2024-04-05

## 2024-07-22 RX ORDER — OXYCODONE HYDROCHLORIDE 5 MG/1
TABLET ORAL
COMMUNITY
Start: 2024-01-23

## 2024-07-22 NOTE — PATIENT INSTRUCTIONS
It was a pleasure seeing you today!    Refer to cardiology  Follow up as needed     Elizabeth Rosen MD  Pulmonary and Critical Care Medicine

## 2024-07-22 NOTE — PROGRESS NOTES
Pulmonary Outpatient Progress Note   Tha Alonso 54 y.o. male MRN: [id number]  7/22/2024      Assessment:    Chronic cough, unable to open previous PFT but was interpreted in 2016 is obstructive chest x-ray normal so a CT chest bothers her from years ago.  RAST in 2022 is normal, no peripheral eosinophilia.  Currently on no pulmonary medications, in the past has tried Stiolto with no significant proven.  He currently takes gabapentin and feels like this is what helped him the most.  He has had a decent workup including blood work and imaging over several years, I do not think we need to reinvestigate.  His cough is dry nonproductive, does not seem to be concerning, there is no hemoptysis.  Occasional diaphoresis, chest discomfort, no diabetes or hypertension history.  Does have a coronary history with his brother who is similar.  Patient did request cardiology referral which I think is reasonable therefore referral placed.  Evaluate for stress test  Intractable migraines follows with neurology and primary care for OMT and chemodenervation  Lifetime non-smoker    Plan:    Clinically stable, cough is been going on for years and I doubt there will be any good solution to it aside from his gabapentin that he been taking  Referral to cardiology for possible stress test  Follow-up as needed  No further pulmonary medications or management      History of Present Illness   HPI:    54-year-old male here for follow-up, normally sees Fiona in the clinic for chronic cough.  He follows with primary care and does OMT for migraines also follows with neurology for chemodenervation.    Patient has had a chronic cough for years has been seeing pulmonary since 2017, workup has included rest, PFT, CT chest and x-rays.  He has tried inhalers in the past with no significant improvement, also currently on gabapentin which gives him the most improvement.     He does occasionally state he has some chest discomfort, occasional diaphoresis.   He has no diabetes or hypertension history.  He does have a family history of coronary artery disease.  His brother recently had a cardiac cath as well    PFT August 2022 interpreted as normal  No echocardiogram on file  Chest x-ray July 2020 reviewed in PACS no significant effusions or masses  CT chest in 2017 reviewed essentially normal with an elevated right diaphragm no mediastinal lymphadenopathy  Labs reviewed no peripheral eosinophilia      Review of Systems   Constitutional: Negative.    HENT: Negative.     Eyes: Negative.    Respiratory:  Positive for cough.    Cardiovascular:  Positive for chest pain.   Gastrointestinal: Negative.    Endocrine: Negative.    Genitourinary: Negative.    Musculoskeletal: Negative.    Skin: Negative.    Allergic/Immunologic: Negative.    Neurological: Negative.    Hematological: Negative.    Psychiatric/Behavioral: Negative.          Historical Information   Past Medical History:   Diagnosis Date   • Chronic migraine without aura without status migrainosus, not intractable 8/6/2021   • Cubital tunnel syndrome     last assessed 1/6/14   • Depression 11/07/2005   • Hyperlipidemia    • Hypertension    • Migraine    • Psychiatric disorder      Past Surgical History:   Procedure Laterality Date   • ELBOW ARTHROPLASTY Bilateral     right elbow reconstruction surgery and transposition surgery of the left elbow   • KNEE ARTHROCENTESIS     • SHOULDER ARTHROPLASTY     • SHOULDER SURGERY  12/06/2018    AC Joint- Left   • SINUS SURGERY      with bone spur removed in uvulectomy done. Had milod [facility] at the time, Dr. Tapia ENT   • TONSILLECTOMY      tonsils removed and also removal of uvula, then a few years ago by Dr. Tapia.At that time he was having frequent sinus and throat infections, last assessed 12/19/16     Family History   Problem Relation Age of Onset   • Migraines Mother    • Depression Mother    • Alzheimer's disease Mother    • No Known Problems Father    • Arthritis Family    •  Breast cancer Family    • Heart disease Family         cardiac disorder   • Hypertension Family    • Breast cancer Sister    • Asperger's syndrome Son      Social History     Tobacco Use   Smoking Status Never   Smokeless Tobacco Never   Tobacco Comments    never smoked       Occupational History: Used to work as a  currently on disability    Meds/Allergies     Current Outpatient Medications:   •  diclofenac (VOLTAREN) 75 mg EC tablet, Take by mouth Every 12 hours, Disp: , Rfl:   •  gabapentin (NEURONTIN) 300 mg capsule, Take 1 capsule (300 mg total) by mouth 3 (three) times a day, Disp: 90 capsule, Rfl: 5  •  Nerve Stimulator (Cefaly Electrode) MISC, Apply once daily prn migraine for 20 minutes., Disp: 1 each, Rfl: 0  •  oxyCODONE (ROXICODONE) 5 immediate release tablet, Take by mouth 6 (six) times a day, Disp: , Rfl:   •  rimegepant sulfate (Nurtec) 75 mg TBDP, Take 1 tablet (75 mg total) by mouth once as needed (migraine) for up to 1 dose, Disp: 8 tablet, Rfl: 1  •  verapamil (VERELAN PM) 120 MG 24 hr capsule, Take 1 capsule (120 mg total) by mouth daily at bedtime, Disp: 30 capsule, Rfl: 4  •  Aimovig 140 MG/ML SOAJ, INJECT 140MG UNDER THE SKIN EVERY 30 DAYS, Disp: 1 mL, Rfl: 11  •  atorvastatin (LIPITOR) 20 mg tablet, TAKE ONE TABLET BY MOUTH EVERY DAY, Disp: 90 tablet, Rfl: 1    Current Facility-Administered Medications:   •  Botulinum Toxin Type A SOLR 200 Units, 200 Units, Injection, Q3 Months, Nneka Menard PA-C, 200 Units at 07/15/22 0934  •  Botulinum Toxin Type A SOLR 200 Units, 200 Units, Injection, Q3 Months, Alena Holden MD, 200 Units at 10/18/22 1059  •  Botulinum Toxin Type A SOLR 200 Units, 200 Units, Injection, Q3 Months, Alena Holden MD, 200 Units at 01/18/23 1545  •  Botulinum Toxin Type A SOLR 200 Units, 200 Units, Injection, Q3 Months, Alena Holden MD, 200 Units at 04/19/23 1658  •  Botulinum Toxin Type A SOLR 200 Units, 200 Units, Injection, Q3 Months, Alena Holden MD, 200 Units  "at 07/25/23 1311  •  Botulinum Toxin Type A SOLR 200 Units, 200 Units, Injection, Q3 Months, Alena Holden MD, 200 Units at 11/01/23 1300  •  Botulinum Toxin Type A SOLR 200 Units, 200 Units, Injection, Q3 Months, Alena Holden MD, 200 Units at 02/08/24 1229  •  Botulinum Toxin Type A SOLR 200 Units, 200 Units, Injection, Q3 Months, , 200 Units at 05/20/24 1303  No Known Allergies    Vitals: Blood pressure 112/80, pulse 83, temperature 97.8 °F (36.6 °C), temperature source Temporal, resp. rate 16, height 5' 9\" (1.753 m), weight 83.5 kg (184 lb), SpO2 97%., Body mass index is 27.17 kg/m². Oxygen Therapy  SpO2: 97 %    Physical Exam:    GEN: alert and oriented x 3, pleasant and cooperative   HEENT:  Normocephalic, atraumatic  NECK: No JVD   HEART: Rate, normal S1 and S2  LUNGS: Clear to auscultation bilaterally; no wheezes, rales, or rhonchi; respiration nonlabored   ABDOMEN:  Normoactive bowel sounds, soft, no tenderness, no distention  EXTREMITIES: no edema  NEURO: no gross focal findings  SKIN:  Dry, intact, warm to touch    Labs: I have personally reviewed pertinent lab results.  No peripheral eosinophilia  No results found for: \"IGE\"    Imaging and other studies: I have personally reviewed pertinent films in PACS  CT chest normal    Pulmonary function testing:  Personally interpreted by me  Apparently obstructive    Other Studies: I have personally reviewed pertinent reports.      Elizabeth Rosen MD  Pulmonary and Critical Care Medicine     "

## 2024-07-30 DIAGNOSIS — E78.00 HYPERCHOLESTEROLEMIA: ICD-10-CM

## 2024-07-30 RX ORDER — ATORVASTATIN CALCIUM 20 MG/1
TABLET, FILM COATED ORAL
Qty: 90 TABLET | Refills: 1 | Status: SHIPPED | OUTPATIENT
Start: 2024-07-30

## 2024-08-14 ENCOUNTER — TELEPHONE (OUTPATIENT)
Dept: NEUROLOGY | Facility: CLINIC | Age: 54
End: 2024-08-14

## 2024-08-27 ENCOUNTER — PROCEDURE VISIT (OUTPATIENT)
Dept: NEUROLOGY | Facility: CLINIC | Age: 54
End: 2024-08-27
Payer: COMMERCIAL

## 2024-08-27 VITALS
OXYGEN SATURATION: 98 % | HEIGHT: 69 IN | DIASTOLIC BLOOD PRESSURE: 70 MMHG | HEART RATE: 70 BPM | BODY MASS INDEX: 27.11 KG/M2 | TEMPERATURE: 97.4 F | WEIGHT: 183 LBS | SYSTOLIC BLOOD PRESSURE: 108 MMHG

## 2024-08-27 DIAGNOSIS — G43.719 INTRACTABLE CHRONIC MIGRAINE WITHOUT AURA AND WITHOUT STATUS MIGRAINOSUS: Primary | ICD-10-CM

## 2024-08-27 PROCEDURE — 64615 CHEMODENERV MUSC MIGRAINE: CPT | Performed by: PSYCHIATRY & NEUROLOGY

## 2024-08-27 NOTE — PROGRESS NOTES
"Universal Protocol   procedure performed by consultantConsent: Written consent obtained.  Consent given by: patient  Time out: Immediately prior to procedure a \"time out\" was called to verify the correct patient, procedure, equipment, support staff and site/side marked as required.  Timeout called at: 8/27/2024 12:29 PM.      Chemodenervation     Date/Time  8/27/2024 12:00 PM     Performed by  Alena Holden MD   Authorized by  Alena Holden MD     Pre-procedure details      Prepped With: Alcohol     Anesthesia  (see MAR for exact dosages):     Anesthesia method:  None   Procedure details      Position:  Upright   Botox      Botox Type:  Type A    Brand:  Botox    Final Concentration per CC:  50 units    Needle Gauge:  30 G 2.5 inch   Procedures      Botox Procedures: chronic headache      Indications: migraines     Post-procedure details      Chemodenervation:  Chronic migraine    Facial Nerve Location::  Bilateral facial nerve    Patient tolerance of procedure:  Tolerated well, no immediate complications       10 units, 2 sites unit(s) was injected into the procerus muscle.    5 unit(s) was injected into the  right  muscle.    5 unit(s) was injected into the  left  muscle.    10 unit(s) was injected into the  right frontalis muscle.    10 unit(s) was injected into the  left frontalis muscle.    40 unit(s) was injected into the  right temporalis muscle.    40 u, 8 sites unit(s) was injected into the  left temporalis muscle.    15 unit(s) was injected into the  right occipitalis muscle.    15 unit(s) was injected into the  left occipitalis muscle.    10 unit(s) was injected into the  right cervical paraspinal muscle.    10 unit(s) was injected into the  left cervical paraspinal muscle.    15 unit(s) was injected into the  right trapezius muscle.    15 unit(s) was injected into the  left trapezius muscle.       A total of 200units were used. A total of 0units were discarded.       Diagnosis " ICD-10-CM Associated Orders   1. Intractable chronic migraine without aura and without status migrainosus  G43.719 Botulinum Toxin Type A SOLR 200 Units     Chemodenervation

## 2024-09-01 NOTE — PROGRESS NOTES
Cardiology Consultation     Tha Alonso  [id number]  1970  HEART & VASCULAR [facility]  [address] [place] [id number]  1. Hypercholesterolemia  POCT ECG    LDL cholesterol, direct    Lipid panel    Hepatic function panel    Echo complete w/ contrast if indicated    Echo stress test, exercise      2. Family history of cardiac disorder  POCT ECG    LDL cholesterol, direct    Lipid panel    Hepatic function panel    Echo complete w/ contrast if indicated    Echo stress test, exercise      3. Chest pain, unspecified type  POCT ECG    LDL cholesterol, direct    Lipid panel    Hepatic function panel    Echo complete w/ contrast if indicated    Echo stress test, exercise      4. Chronic cough  Ambulatory Referral to Cardiology    LDL cholesterol, direct    Lipid panel    Hepatic function panel    Echo complete w/ contrast if indicated    Echo stress test, exercise        Patient Active Problem List   Diagnosis    Chronic cough    Chronic migraine without aura    Hypercholesterolemia    Benign familial tremor    Bipolar affective disorder, currently depressed, moderate (HCC)    Persistent insomnia of non-organic origin    Astigmatism of right eye    Migraine with aura and without status migrainosus, not intractable    Intractable chronic migraine without aura and without status migrainosus    Chronic migraine without aura without status migrainosus, not intractable    Somatic dysfunction of cervical region    Somatic dysfunction of head region       HPI Patient referred by Pulmonary service for cards evaluation. He has chest pain. He has HLD and FH of heart disease. He has cough and migraine. Lipids 2/2023 showed a TC of 191 with an HDL of 48 and an LDL of 126.  EKG today demonstrates NSR and is a normal tracing.  Patient's blood pressure is low normal today.  Heart rate is well-controlled.  Patient is on disability from [employer]  where he worked as a .  He injured his back at work and likely will need a procedure in reference to this.  In reference to family history his father had a heart attack at the age of 51 and had stents put in.  His brother had a heart attack at the age of 56 and is managed with medication.  A younger brother has a heart issue at the age of 55 and is on medication.  His mother  of Alzheimer's in her 80s.  Patient is a non-smoker.    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 [employer]   Tobacco Use    Smoking status: Never    Smokeless tobacco: Never    Tobacco comments:     never smoked   Vaping Use    Vaping status: Never Used   Substance and Sexual Activity    Alcohol use: Not Currently     Comment: rare    Drug use: No     Comment: social per [employer]    Sexual activity: Yes     Partners: Female   Other Topics Concern    Not on file   Social History Narrative    Daily tea consumption     per [employer]     Social Determinants of Health     Financial Resource Strain: Not on file   Food Insecurity: Not on file   Transportation Needs: Not on file   Physical Activity: Not on file   Stress: Not on file   Social Connections: Not on file   Intimate Partner Violence: Not on file   Housing Stability: Not on file        FAMILY HISTORY-  Family History   Problem Relation Age of Onset    Migraines Mother     Depression Mother     Alzheimer's disease Mother     No Known Problems Father      Arthritis Family     Breast cancer Family     Heart disease Family         cardiac disorder    Hypertension Family     Breast cancer Sister     Asperger's syndrome Son        SURGICAL HISTORY-  Past Surgical History:   Procedure Laterality Date    ELBOW ARTHROPLASTY Bilateral     right elbow reconstruction surgery and transposition surgery of the left elbow    KNEE ARTHROCENTESIS      SHOULDER ARTHROPLASTY      SHOULDER SURGERY  12/06/2018    AC Joint- Left    SINUS SURGERY      with bone spur removed in uvulectomy done. Had milod [facility] at the time, Dr. Tapia ENT    TONSILLECTOMY      tonsils removed and also removal of uvula, then a few years ago by Dr. Tapia.At that time he was having frequent sinus and throat infections, last assessed 12/19/16         Current Outpatient Medications:     Aimovig 140 MG/ML SOAJ, INJECT 140MG UNDER THE SKIN EVERY 30 DAYS, Disp: 1 mL, Rfl: 11    atorvastatin (LIPITOR) 20 mg tablet, TAKE ONE TABLET BY MOUTH EVERY DAY, Disp: 90 tablet, Rfl: 1    diclofenac (VOLTAREN) 75 mg EC tablet, Take by mouth Every 12 hours, Disp: , Rfl:     gabapentin (NEURONTIN) 300 mg capsule, Take 1 capsule (300 mg total) by mouth 3 (three) times a day, Disp: 90 capsule, Rfl: 0    Nerve Stimulator (Cefaly Electrode) MISC, Apply once daily prn migraine for 20 minutes., Disp: 1 each, Rfl: 0    oxyCODONE (ROXICODONE) 5 immediate release tablet, Take by mouth 6 (six) times a day, Disp: , Rfl:     rimegepant sulfate (Nurtec) 75 mg TBDP, Take 1 tablet (75 mg total) by mouth once as needed (migraine) for up to 1 dose, Disp: 8 tablet, Rfl: 1    verapamil (VERELAN PM) 120 MG 24 hr capsule, Take 1 capsule (120 mg total) by mouth daily at bedtime, Disp: 30 capsule, Rfl: 4    Current Facility-Administered Medications:     Botulinum Toxin Type A SOLR 200 Units, 200 Units, Injection, Q3 Months, Nneka Menard PA-C, 200 Units at 07/15/22 0934    Botulinum Toxin Type A SOLR 200 Units, 200 Units, Injection, Q3 Months, Alena  "MD Adina, 200 Units at 10/18/22 1059    Botulinum Toxin Type A SOLR 200 Units, 200 Units, Injection, Q3 Months, Alena Holden MD, 200 Units at 01/18/23 1545    Botulinum Toxin Type A SOLR 200 Units, 200 Units, Injection, Q3 Months, Alena Holden MD, 200 Units at 04/19/23 1658    Botulinum Toxin Type A SOLR 200 Units, 200 Units, Injection, Q3 Months, Alena Holden MD, 200 Units at 07/25/23 1311    Botulinum Toxin Type A SOLR 200 Units, 200 Units, Injection, Q3 Months, Alena Holden MD, 200 Units at 11/01/23 1300    Botulinum Toxin Type A SOLR 200 Units, 200 Units, Injection, Q3 Months, Alena Holden MD, 200 Units at 02/08/24 1229    Botulinum Toxin Type A SOLR 200 Units, 200 Units, Injection, Q3 Months, , 200 Units at 05/20/24 1303    Botulinum Toxin Type A SOLR 200 Units, 200 Units, Injection, Q3 Months, , 200 Units at 08/27/24 1308  No Known Allergies  Vitals:    09/10/24 1052   BP: 102/62   BP Location: Left arm   Patient Position: Sitting   Cuff Size: Standard   Pulse: 72   SpO2: 99%   Weight: 83.9 kg (185 lb)   Height: 5' 9\" (1.753 m)         Review of Systems:  Review of Systems   All other systems reviewed and are negative.      Physical Exam:  Physical Exam  Vitals reviewed.   Constitutional:       Appearance: He is well-developed.   HENT:      Head: Normocephalic and atraumatic.   Cardiovascular:      Rate and Rhythm: Normal rate.      Heart sounds: Normal heart sounds.   Pulmonary:      Effort: Pulmonary effort is normal.      Breath sounds: Normal breath sounds.   Musculoskeletal:      Cervical back: Normal range of motion.   Skin:     General: Skin is warm and dry.   Neurological:      Mental Status: He is alert and oriented to person, place, and time.         Discussion/Summary: Patient with significant family history of CAD.  Will check a lipid profile on atorvastatin to assess for current control on his dose of 20 mg.  Will check cardiac testing.  Will check an echo to assess LV function and valve " structure and function.  Will check a stress echo to exclude severe obstructive CAD.  Of asked the patient to call if there is a problem in the interim.  I will see him in follow-up in 4 to 6 months and sooner as is necessary.

## 2024-09-06 DIAGNOSIS — R05.9 COUGH: ICD-10-CM

## 2024-09-06 RX ORDER — GABAPENTIN 300 MG/1
300 CAPSULE ORAL 3 TIMES DAILY
Qty: 90 CAPSULE | Refills: 0 | Status: SHIPPED | OUTPATIENT
Start: 2024-09-06

## 2024-09-06 NOTE — TELEPHONE ENCOUNTER
Reason for call:   [x] Refill   [] Prior Auth  [] Other:     Office:   [] PCP/Provider -   [x] Specialty/Provider - pulmonary    Medication: gabapentin    Dose/Frequency: 300 mg take one capsule three times daily     Quantity: 90    Pharmacy: [facility] [place]     Does the patient have enough for 3 days?   [x] Yes   [] No - Send as HP to POD

## 2024-09-10 ENCOUNTER — OFFICE VISIT (OUTPATIENT)
Dept: CARDIOLOGY CLINIC | Facility: CLINIC | Age: 54
End: 2024-09-10
Payer: COMMERCIAL

## 2024-09-10 VITALS
HEIGHT: 69 IN | HEART RATE: 72 BPM | WEIGHT: 185 LBS | SYSTOLIC BLOOD PRESSURE: 102 MMHG | BODY MASS INDEX: 27.4 KG/M2 | DIASTOLIC BLOOD PRESSURE: 62 MMHG | OXYGEN SATURATION: 99 %

## 2024-09-10 DIAGNOSIS — Z82.49 FAMILY HISTORY OF CARDIAC DISORDER: ICD-10-CM

## 2024-09-10 DIAGNOSIS — R05.3 CHRONIC COUGH: ICD-10-CM

## 2024-09-10 DIAGNOSIS — E78.00 HYPERCHOLESTEROLEMIA: Primary | ICD-10-CM

## 2024-09-10 DIAGNOSIS — R07.9 CHEST PAIN, UNSPECIFIED TYPE: ICD-10-CM

## 2024-09-10 PROCEDURE — 99244 OFF/OP CNSLTJ NEW/EST MOD 40: CPT | Performed by: INTERNAL MEDICINE

## 2024-09-10 PROCEDURE — 93000 ELECTROCARDIOGRAM COMPLETE: CPT | Performed by: INTERNAL MEDICINE

## 2024-10-08 ENCOUNTER — HOSPITAL ENCOUNTER (OUTPATIENT)
Dept: NON INVASIVE DIAGNOSTICS | Facility: CLINIC | Age: 54
Discharge: HOME | End: 2024-10-08

## 2024-10-31 ENCOUNTER — HOSPITAL ENCOUNTER (OUTPATIENT)
Dept: NON INVASIVE DIAGNOSTICS | Facility: CLINIC | Age: 54
Discharge: HOME | End: 2024-10-31
Payer: COMMERCIAL

## 2024-10-31 VITALS
OXYGEN SATURATION: 99 % | HEART RATE: 81 BPM | SYSTOLIC BLOOD PRESSURE: 110 MMHG | HEIGHT: 69 IN | BODY MASS INDEX: 27.4 KG/M2 | WEIGHT: 185 LBS | DIASTOLIC BLOOD PRESSURE: 80 MMHG

## 2024-10-31 VITALS
SYSTOLIC BLOOD PRESSURE: 102 MMHG | HEART RATE: 65 BPM | DIASTOLIC BLOOD PRESSURE: 62 MMHG | WEIGHT: 185 LBS | HEIGHT: 69 IN | BODY MASS INDEX: 27.4 KG/M2

## 2024-10-31 DIAGNOSIS — R05.3 CHRONIC COUGH: ICD-10-CM

## 2024-10-31 DIAGNOSIS — E78.00 HYPERCHOLESTEROLEMIA: ICD-10-CM

## 2024-10-31 DIAGNOSIS — R07.9 CHEST PAIN, UNSPECIFIED TYPE: ICD-10-CM

## 2024-10-31 DIAGNOSIS — Z82.49 FAMILY HISTORY OF CARDIAC DISORDER: ICD-10-CM

## 2024-10-31 LAB
AORTIC ROOT: 3.3 CM
APICAL FOUR CHAMBER EJECTION FRACTION: 64 %
ASCENDING AORTA: 3.5 CM
BSA FOR ECHO PROCEDURE: 2 M2
CHEST PAIN STATEMENT: NORMAL
E WAVE DECELERATION TIME: 191 MS
E/A RATIO: 1.31
FRACTIONAL SHORTENING: 31 (ref 28–44)
INTERVENTRICULAR SEPTUM IN DIASTOLE (PARASTERNAL SHORT AXIS VIEW): 0.9 CM
INTERVENTRICULAR SEPTUM: 0.9 CM (ref 0.6–1.1)
LAAS-AP2: 14.4 CM2
LAAS-AP4: 14.6 CM2
LEFT ATRIUM SIZE: 3.4 CM
LEFT ATRIUM VOLUME (MOD BIPLANE): 37 ML
LEFT ATRIUM VOLUME INDEX (MOD BIPLANE): 18.5 ML/M2
LEFT INTERNAL DIMENSION IN SYSTOLE: 3.4 CM (ref 2.1–4)
LEFT VENTRICULAR INTERNAL DIMENSION IN DIASTOLE: 4.9 CM (ref 3.5–6)
LEFT VENTRICULAR POSTERIOR WALL IN END DIASTOLE: 0.9 CM
LEFT VENTRICULAR STROKE VOLUME: 65 ML
LVSV (TEICH): 65 ML
MAX DIASTOLIC BP: 78 MMHG
MAX HR PERCENT: 89 %
MAX HR: 150 BPM
MAX PREDICTED HEART RATE: 167 BPM
MV E'TISSUE VEL-SEP: 10 CM/S
MV PEAK A VEL: 0.58 M/S
MV PEAK E VEL: 76 CM/S
MV STENOSIS PRESSURE HALF TIME: 55 MS
MV VALVE AREA P 1/2 METHOD: 4
PROTOCOL NAME: NORMAL
RA PRESSURE ESTIMATED: 3 MMHG
RATE PRESSURE PRODUCT: NORMAL
REASON FOR TERMINATION: NORMAL
RIGHT ATRIAL 2D VOLUME: 46 ML
RIGHT ATRIUM AREA SYSTOLE A4C: 18 CM2
RIGHT VENTRICLE ID DIMENSION: 3.1 CM
RV PSP: 24 MMHG
SL CV LEFT ATRIUM LENGTH A2C: 4.8 CM
SL CV LV EF: 65
SL CV PED ECHO LEFT VENTRICLE DIASTOLIC VOLUME (MOD BIPLANE) 2D: 112 ML
SL CV PED ECHO LEFT VENTRICLE SYSTOLIC VOLUME (MOD BIPLANE) 2D: 47 ML
SL CV STRESS RECOVERY BP: NORMAL MMHG
SL CV STRESS RECOVERY HR: 100 BPM
SL CV STRESS RECOVERY O2 SAT: 98 %
SL CV STRESS STAGE REACHED: 3
STRESS ANGINA INDEX: 0
STRESS BASELINE BP: NORMAL MMHG
STRESS BASELINE HR: 81 BPM
STRESS O2 SAT REST: 99 %
STRESS PEAK HR: 146 BPM
STRESS POST ESTIMATED WORKLOAD: 10.1 METS
STRESS POST EXERCISE DUR MIN: 8 MIN
STRESS POST EXERCISE DUR MIN: 8 MIN
STRESS POST EXERCISE DUR SEC: 1 SEC
STRESS POST EXERCISE DUR SEC: 1 SEC
STRESS POST O2 SAT PEAK: 98 %
STRESS POST PEAK BP: 168 MMHG
STRESS POST PEAK HR: 150 BPM
STRESS POST PEAK SYSTOLIC BP: 168 MMHG
TARGET HR FORMULA: NORMAL
TEST INDICATION: NORMAL
TR MAX PG: 21 MMHG
TR PEAK VELOCITY: 2.3 M/S
TRICUSPID ANNULAR PLANE SYSTOLIC EXCURSION: 1.8 CM
TRICUSPID VALVE PEAK REGURGITATION VELOCITY: 2.31 M/S

## 2024-10-31 PROCEDURE — 93350 STRESS TTE ONLY: CPT

## 2024-10-31 PROCEDURE — 93350 STRESS TTE ONLY: CPT | Performed by: INTERNAL MEDICINE

## 2024-10-31 PROCEDURE — 93306 TTE W/DOPPLER COMPLETE: CPT

## 2024-10-31 PROCEDURE — 93306 TTE W/DOPPLER COMPLETE: CPT | Performed by: INTERNAL MEDICINE

## 2024-12-05 ENCOUNTER — PROCEDURE VISIT (OUTPATIENT)
Age: 54
End: 2024-12-05
Payer: COMMERCIAL

## 2024-12-05 VITALS
HEART RATE: 70 BPM | BODY MASS INDEX: 28.29 KG/M2 | SYSTOLIC BLOOD PRESSURE: 124 MMHG | HEIGHT: 69 IN | DIASTOLIC BLOOD PRESSURE: 82 MMHG | WEIGHT: 191 LBS

## 2024-12-05 DIAGNOSIS — G43.719 INTRACTABLE CHRONIC MIGRAINE WITHOUT AURA AND WITHOUT STATUS MIGRAINOSUS: Primary | ICD-10-CM

## 2024-12-05 PROCEDURE — 64615 CHEMODENERV MUSC MIGRAINE: CPT | Performed by: PSYCHIATRY & NEUROLOGY

## 2024-12-05 NOTE — PROGRESS NOTES
"Universal Protocol   procedure performed by consultantConsent: Written consent obtained.  Consent given by: patient  Time out: Immediately prior to procedure a \"time out\" was called to verify the correct patient, procedure, equipment, support staff and site/side marked as required.  Timeout called at: 12/5/2024 11:44 AM.      Chemodenervation     Date/Time  12/5/2024 11:30 AM     Performed by  Alena Holden MD   Authorized by  Alena Holden MD     Pre-procedure details      Prepped With: Alcohol     Anesthesia  (see MAR for exact dosages):     Anesthesia method:  None   Procedure details      Position:  Upright   Botox      Botox Type:  Type A    Brand:  Botox    Final Concentration per CC:  50 units    Needle Gauge:  30 G 2.5 inch   Procedures      Botox Procedures: chronic headache      Indications: migraines     Post-procedure details      Chemodenervation:  Chronic migraine    Facial Nerve Location::  Bilateral facial nerve    Patient tolerance of procedure:  Tolerated well, no immediate complications       10 units, 2 sites unit(s) was injected into the procerus muscle.    5 unit(s) was injected into the  right  muscle.    5 unit(s) was injected into the  left  muscle.    10 unit(s) was injected into the  right frontalis muscle.    10 unit(s) was injected into the  left frontalis muscle.    40 unit(s) was injected into the  right temporalis muscle.    40 u, 8 sites unit(s) was injected into the  left temporalis muscle.    15 unit(s) was injected into the  right occipitalis muscle.    15 unit(s) was injected into the  left occipitalis muscle.    10 unit(s) was injected into the  right cervical paraspinal muscle.    10 unit(s) was injected into the  left cervical paraspinal muscle.    15 unit(s) was injected into the  right trapezius muscle.    15 unit(s) was injected into the  left trapezius muscle.       A total of 200units were used. A total of 0units were discarded.       Diagnosis " ICD-10-CM Associated Orders   1. Intractable chronic migraine without aura and without status migrainosus  G43.719 Botulinum Toxin Type A SOLR 200 Units

## 2024-12-10 DIAGNOSIS — R05.9 COUGH: ICD-10-CM

## 2024-12-10 RX ORDER — GABAPENTIN 300 MG/1
300 CAPSULE ORAL 3 TIMES DAILY
Qty: 90 CAPSULE | Refills: 0 | Status: SHIPPED | OUTPATIENT
Start: 2024-12-10

## 2024-12-10 NOTE — TELEPHONE ENCOUNTER
Reason for call:   [x] Refill   [] Prior Auth  [] Other:     Office:   [] PCP/Provider -   [x] Specialty/Provider - Francesco Novoa/ MD Silas    Medication: gabapentin (NEURONTIN) 300 mg capsule    Dose/Frequency: Take 1 capsule (300 mg total) by mouth 3 (three) times a day    Quantity: 90    Pharmacy: [id number] [address] [place] [address] [phone]    Does the patient have enough for 3 days?   [] Yes   [x] No - Send as HP to POD

## 2024-12-26 DIAGNOSIS — G43.709 CHRONIC MIGRAINE WITHOUT AURA WITHOUT STATUS MIGRAINOSUS, NOT INTRACTABLE: ICD-10-CM

## 2024-12-26 DIAGNOSIS — E78.00 HYPERCHOLESTEROLEMIA: ICD-10-CM

## 2024-12-26 DIAGNOSIS — R05.9 COUGH: ICD-10-CM

## 2024-12-26 RX ORDER — ERENUMAB-AOOE 140 MG/ML
INJECTION, SOLUTION SUBCUTANEOUS
Qty: 1 ML | Refills: 2 | Status: SHIPPED | OUTPATIENT
Start: 2024-12-26

## 2024-12-26 RX ORDER — ATORVASTATIN CALCIUM 20 MG/1
20 TABLET, FILM COATED ORAL DAILY
Qty: 90 TABLET | Refills: 0 | Status: SHIPPED | OUTPATIENT
Start: 2024-12-26

## 2024-12-26 RX ORDER — GABAPENTIN 300 MG/1
300 CAPSULE ORAL 3 TIMES DAILY
Qty: 90 CAPSULE | Refills: 0 | Status: SHIPPED | OUTPATIENT
Start: 2024-12-26

## 2025-01-09 ENCOUNTER — TELEPHONE (OUTPATIENT)
Age: 55
End: 2025-01-09

## 2025-01-09 DIAGNOSIS — G43.709 CHRONIC MIGRAINE WITHOUT AURA: Primary | ICD-10-CM

## 2025-01-09 DIAGNOSIS — G43.709 CHRONIC MIGRAINE WITHOUT AURA: ICD-10-CM

## 2025-01-09 NOTE — TELEPHONE ENCOUNTER
Pt requested call back from Khloe  Do not see any encounters for call back      Was very vague with response and would not talk to me    Please advise

## 2025-01-09 NOTE — TELEPHONE ENCOUNTER
I called and spoke with the patient.  His prescription coverage changed, and Aimovig is no longer covered.  Would you change his script to [facility] as this medication is covered, otherwise, he would need a letter explaining why he needs Aimovig?  If agreeable, please send Emgality script to [facility].  Thanks

## 2025-01-14 ENCOUNTER — TELEPHONE (OUTPATIENT)
Age: 55
End: 2025-01-14

## 2025-01-14 NOTE — TELEPHONE ENCOUNTER
Patient called to notify the provider that [facility]gality needs a prior authorization. Patient made aware that the pharmacy called earlier today and the information was submitted to the prior authorization team.

## 2025-01-14 NOTE — TELEPHONE ENCOUNTER
Reason for call:   [x] Prior Auth  [] Other:     Caller:  [] Patient  [x] Pharmacy [facility]   Name:   Address:   Callback Number:     Medication: Emgality 120 mg/ml  First time administer 2 injections 2-3 hrs apart, then 1 injection every 30 days.       Ordering Provider:   [] PCP/Provider -   [x] Speciality/Provider - [facility]     Has the patient tried other medications and failed? If failed, which medications did they fail?    [] No   [x] Yes - Ubrelvy     Is the patient's insurance updated in EPIC?   [x] Yes   [] No     Is a copy of the patient's insurance scanned in EPIC?   [x] Yes   [] No

## 2025-01-16 NOTE — TELEPHONE ENCOUNTER
Per CMM, [facility]-  Your PA request has been approved through 4/14/25    Approval letter scanned in media    Called [facility] pharmacy and left a detailed message on their answering machine making them aware of the approval and to dispense the loading dose first. Cb if any questions.

## 2025-01-29 DIAGNOSIS — R05.9 COUGH: ICD-10-CM

## 2025-01-29 RX ORDER — GABAPENTIN 300 MG/1
300 CAPSULE ORAL 3 TIMES DAILY
Qty: 90 CAPSULE | Refills: 0 | Status: SHIPPED | OUTPATIENT
Start: 2025-01-29

## 2025-02-19 ENCOUNTER — CONSULT (OUTPATIENT)
Dept: FAMILY MEDICINE CLINIC | Facility: CLINIC | Age: 55
End: 2025-02-19
Payer: OTHER MISCELLANEOUS

## 2025-02-19 VITALS
RESPIRATION RATE: 18 BRPM | BODY MASS INDEX: 28.64 KG/M2 | HEART RATE: 76 BPM | SYSTOLIC BLOOD PRESSURE: 124 MMHG | HEIGHT: 69 IN | WEIGHT: 193.4 LBS | DIASTOLIC BLOOD PRESSURE: 86 MMHG | TEMPERATURE: 97.9 F

## 2025-02-19 DIAGNOSIS — M54.16 LUMBAR RADICULOPATHY, CHRONIC: ICD-10-CM

## 2025-02-19 DIAGNOSIS — Z12.5 SCREENING FOR PROSTATE CANCER: ICD-10-CM

## 2025-02-19 DIAGNOSIS — F31.32 BIPOLAR AFFECTIVE DISORDER, CURRENTLY DEPRESSED, MODERATE (HCC): ICD-10-CM

## 2025-02-19 DIAGNOSIS — Z98.890 S/P LUMBAR LAMINECTOMY: ICD-10-CM

## 2025-02-19 DIAGNOSIS — Z00.00 WELL ADULT EXAM: Primary | ICD-10-CM

## 2025-02-19 DIAGNOSIS — Z01.818 PRE-OP EXAMINATION: ICD-10-CM

## 2025-02-19 DIAGNOSIS — Z13.6 SCREENING FOR CARDIOVASCULAR CONDITION: ICD-10-CM

## 2025-02-19 PROCEDURE — 99396 PREV VISIT EST AGE 40-64: CPT | Performed by: FAMILY MEDICINE

## 2025-02-19 RX ORDER — CEPHALEXIN 500 MG/1
CAPSULE ORAL
COMMUNITY
Start: 2024-10-01

## 2025-02-19 RX ORDER — CEPHALEXIN 750 MG/1
1 CAPSULE ORAL EVERY 12 HOURS
COMMUNITY
Start: 2024-10-01

## 2025-02-19 NOTE — PROGRESS NOTES
Pre-operative Clearance  Name: Tha Alonso      : 1970      MRN: [id number]  Encounter Provider: Frank Lombardi, DO  Encounter Date: 2025   Encounter department: [facility]    Assessment & Plan  Lumbar radiculopathy, chronic         S/P lumbar laminectomy         Well adult exam  Pt counseled on weight and exercise  PSA ordered fro next appt       Pre-op examination  EKG - NSR no signs of ischemia  Labs - Sugar was elevated - not fasting - A1c was acceptable  Chest XR - acceptable for surgery       Bipolar affective disorder, currently depressed, moderate (HCC)         Screening for prostate cancer    Orders:  •  PSA (Reflex To Free) (Serial); Future    Screening for cardiovascular condition    Orders:  •  Comprehensive metabolic panel; Future  •  Lipid Panel with Direct LDL reflex; Future    Pre-operative Clearance:     Revised Cardiac Risk Index:  RCI RISK CLASS I (0 risk factors, risk of major cardiac complications approximately 0.5%)    Clearance:  Patient is medically optimized (CLEARED) for proposed surgery without any additional cardiac testing.       No nsaids - pt does not take at this time  Statin - skip am of surgery  Can take the Verapamil with a small sip of water in the Am of surgery  OK to take gabapentin AM of surgery    Medication Instructions:   - Antiepileptic meds: Continue to take this medication on your normal schedule.  - Calcium channel blockers: Continue to take this medication on your normal schedule.  - Hyperlipidemia meds: Continue to take this medication on your normal schedule.  - Opioids: Continue to take this medication on your normal schedule.       History of Present Illness     Pt is sched for a pre op  Also due for a full physical  Well balanced diet    Sleeps well  Walks for exercise  Wears contacts  Sees dentist  Hearing is OK but has tinnitis    Pre-op Exam  Surgery: Lumbar Fusion  Anticipated Date of Surgery: 3/11/25  Surgeon: Dr Rendon    Pt is  having Lumbar fusion  H/o Lumbar  Laminectomy    Has gotten nausead after dental procedure in the distant past    Previous history of bleeding disorders or clots?: No  Previous Anesthesia reaction?: Yes  Prolonged steroid use in the last 6 months?: No    Assessment of Cardiac Risk:   - Unstable or severe angina or MI in the last 6 weeks or history of stent placement in the last year?: No   - Decompensated heart failure (e.g. New onset heart failure, NYHA  Class IV heart failure, or worsening existing heart failure)?: No  - Significant arrhythmias such as high grade AV block, symptomatic ventricular arrhythmia, newly recognized ventricular tachycardia, supraventricular tachycardia with resting heart rate >100, or symptomatic bradycardia?: No  - Severe heart valve disease including aortic stenosis or symptomatic mitral stenosis?: No      Pre-operative Risk Factors:  Elevated-risk surgery: No    History of cerebrovascular disease: No    History of ischemic heart disease: No  Pre-operative treatment with insulin: No  Pre-operative creatinine >2 mg/dL: No    History of congestive heart failure: No    Duke Activity Status Index (DASI):   DASI Total Score: 58.2  METs: 9.9    Medications of Perioperative Concern:   Calcium channel blockers    Review of Systems   Constitutional:  Negative for activity change, appetite change, chills, diaphoresis, fatigue, fever and unexpected weight change.   HENT:  Negative for congestion, dental problem, ear pain, mouth sores, sinus pressure, sinus pain, sore throat and trouble swallowing.    Eyes:  Negative for photophobia, discharge and itching.   Respiratory:  Negative for apnea, chest tightness and shortness of breath.    Cardiovascular:  Negative for chest pain, palpitations and leg swelling.   Gastrointestinal:  Negative for abdominal distention, abdominal pain, blood in stool, nausea and vomiting.   Endocrine: Negative for cold intolerance, heat intolerance, polydipsia, polyphagia  and polyuria.   Genitourinary:  Negative for difficulty urinating.   Musculoskeletal:  Positive for back pain. Negative for arthralgias.   Skin:  Negative for color change and wound.   Neurological:  Negative for dizziness, syncope, speech difficulty and headaches.   Hematological:  Negative for adenopathy.   Psychiatric/Behavioral:  Negative for agitation and behavioral problems.      Past Medical History   Past Medical History:   Diagnosis Date   • Chronic migraine without aura without status migrainosus, not intractable 8/6/2021   • Cubital tunnel syndrome     last assessed 1/6/14   • Depression 11/07/2005   • Hyperlipidemia    • Hypertension    • Migraine    • Psychiatric disorder      Past Surgical History:   Procedure Laterality Date   • ELBOW ARTHROPLASTY Bilateral     right elbow reconstruction surgery and transposition surgery of the left elbow   • KNEE ARTHROCENTESIS     • SHOULDER ARTHROPLASTY     • SHOULDER SURGERY  12/06/2018    AC Joint- Left   • SINUS SURGERY      with bone spur removed in uvulectomy done. Had [facility] at the time, Dr. Tapia ENT   • TONSILLECTOMY      tonsils removed and also removal of uvula, then a few years ago by Dr. Tapia.At that time he was having frequent sinus and throat infections, last assessed 12/19/16     Family History   Problem Relation Age of Onset   • Migraines Mother    • Depression Mother    • Alzheimer's disease Mother    • No Known Problems Father    • Arthritis Family    • Breast cancer Family    • Heart disease Family         cardiac disorder   • Hypertension Family    • Breast cancer Sister    • Asperger's syndrome Son      Social History     Tobacco Use   • Smoking status: Never   • Smokeless tobacco: Never   • Tobacco comments:     never smoked   Vaping Use   • Vaping status: Never Used   Substance and Sexual Activity   • Alcohol use: Not Currently     Comment: rare   • Drug use: No     Comment: social per [employer]   • Sexual activity: Yes     Partners:  "Female     Current Outpatient Medications on File Prior to Visit   Medication Sig   • atorvastatin (LIPITOR) 20 mg tablet TAKE 1 TABLET BY MOUTH EVERY DAY   • diclofenac (VOLTAREN) 75 mg EC tablet Take by mouth Every 12 hours   • gabapentin (NEURONTIN) 300 mg capsule TAKE 1 CAPSULE BY MOUTH THREE TIMES DAILY   • Galcanezumab-gnlm 120 MG/ML SOAJ First time administer 2 injections 2-3 hrs apart, then 1 injection every 30 days.   • Nerve Stimulator (Cefaly Electrode) MISC Apply once daily prn migraine for 20 minutes.   • oxyCODONE (ROXICODONE) 5 immediate release tablet Take by mouth 6 (six) times a day   • rimegepant sulfate (Nurtec) 75 mg TBDP Take 1 tablet (75 mg total) by mouth once as needed (migraine) for up to 1 dose   • verapamil (VERELAN PM) 120 MG 24 hr capsule Take 1 capsule (120 mg total) by mouth daily at bedtime   • cephalexin (KEFLEX) 500 mg capsule take 1 capsule by oral route 3 times every day for post op antibiotics (Patient not taking: Reported on 2/19/2025)   • cephalexin (KEFLEX) 750 MG capsule Take 1 capsule by mouth every 12 (twelve) hours (Patient not taking: Reported on 2/19/2025)     No Known Allergies  Objective   /86   Pulse 76   Temp 97.9 °F (36.6 °C)   Resp 18   Ht 5' 9\" (1.753 m)   Wt 87.7 kg (193 lb 6.4 oz)   BMI 28.56 kg/m²     Physical Exam  Vitals and nursing note reviewed.   Constitutional:       General: He is not in acute distress.     Appearance: He is well-developed. He is not diaphoretic.   HENT:      Head: Normocephalic and atraumatic.      Right Ear: External ear normal.      Left Ear: External ear normal.      Nose: Nose normal.      Mouth/Throat:      Pharynx: No oropharyngeal exudate.   Eyes:      General: No scleral icterus.        Right eye: No discharge.         Left eye: No discharge.      Pupils: Pupils are equal, round, and reactive to light.   Neck:      Thyroid: No thyromegaly.   Cardiovascular:      Rate and Rhythm: Normal rate.      Heart sounds: " Normal heart sounds. No murmur heard.  Pulmonary:      Effort: Pulmonary effort is normal. No respiratory distress.      Breath sounds: Normal breath sounds. No wheezing.   Abdominal:      General: Bowel sounds are normal. There is no distension.      Palpations: Abdomen is soft. There is no mass.      Tenderness: There is no abdominal tenderness. There is no guarding or rebound.   Musculoskeletal:         General: Normal range of motion.   Skin:     General: Skin is warm and dry.      Findings: No erythema or rash.   Neurological:      Mental Status: He is alert.      Coordination: Coordination normal.      Deep Tendon Reflexes: Reflexes normal.   Psychiatric:         Behavior: Behavior normal.       Past Medical History:   Diagnosis Date   • Chronic migraine without aura without status migrainosus, not intractable 8/6/2021   • Cubital tunnel syndrome     last assessed 1/6/14   • Depression 11/07/2005   • Hyperlipidemia    • Hypertension    • Migraine    • Psychiatric disorder           Frank Lombardi, DO

## 2025-02-26 ENCOUNTER — TELEPHONE (OUTPATIENT)
Age: 55
End: 2025-02-26

## 2025-02-26 NOTE — TELEPHONE ENCOUNTER
----- Message from Esthela CROCKETT sent at 2025 12:50 PM EST -----  Regarding: re auth appt requested  Hello,  This pts authorization for Botox has . Their last office visit was on 1/3/2024. The pt is scheduled for her next Botox injection on 3/20. Could you please schedule a reautShopPad appt so I can obtain updated OV note to submit for auth.   Thank you for your help,  Esthela

## 2025-02-26 NOTE — TELEPHONE ENCOUNTER
I contacted Patient to schedule him a f/u migraines in order to obtain auth for the pts upcoming botox appointment. I sent a message DJ to add Patient to that appointment slot.

## 2025-02-28 DIAGNOSIS — R05.9 COUGH: ICD-10-CM

## 2025-02-28 RX ORDER — GABAPENTIN 300 MG/1
300 CAPSULE ORAL 3 TIMES DAILY
Qty: 90 CAPSULE | Refills: 0 | Status: SHIPPED | OUTPATIENT
Start: 2025-02-28

## 2025-02-28 NOTE — PROGRESS NOTES
Cardiology Follow Up    Tha Alonso  1970  [id number]  [facility] [place]  [address]  [place] [address] [phone]    1. Hypercholesterolemia        2. Family history of cardiac disorder        3. Chest pain, unspecified type          Interval History: Patient is here for FU. He had chest pain. He has HLD and FH of heart disease. Lipids 2/2023 showed a TC of 191 with an HDL of 48 and an LDL of 126.   Patient is on disability from [employer] where he worked as a .  He injured his back at work. Patient is a non-smoker.  Echocardiogram 10/31/2024 demonstrated LVEF of 65%.  There was no significant valve disease.  Stress echo showed no ischemia after 8 minutes of exercise.  Patient is on Atorvastatin.  His PCP monitors his blood work.  His VS are stable.  He has had no CP or significant dyspnea.    Patient Active Problem List   Diagnosis   • Chronic cough   • Chronic migraine without aura   • Hypercholesterolemia   • Benign familial tremor   • Bipolar affective disorder, currently depressed, moderate (HCC)   • Persistent insomnia of non-organic origin   • Astigmatism of right eye   • Migraine with aura and without status migrainosus, not intractable   • Intractable chronic migraine without aura and without status migrainosus   • Chronic migraine without aura without status migrainosus, not intractable   • Somatic dysfunction of cervical region   • Somatic dysfunction of head region   • S/P lumbar laminectomy     Past Medical History:   Diagnosis Date   • Chronic migraine without aura without status migrainosus, not intractable 8/6/2021   • Cubital tunnel syndrome     last assessed 1/6/14   • Depression 11/07/2005   • Hyperlipidemia    • Hypertension    • Migraine    • Psychiatric disorder      Social History     Socioeconomic History   • Marital status:      Spouse name: Not on file   • Number of children: Not on  file   • Years of education: Not on file   • Highest education level: Not on file   Occupational History   • 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 [employer]   Tobacco Use   • Smoking status: Never   • Smokeless tobacco: Never   • Tobacco comments:     never smoked   Vaping Use   • Vaping status: Never Used   Substance and Sexual Activity   • Alcohol use: Not Currently     Comment: rare   • Drug use: No     Comment: social per Allscripts   • Sexual activity: Yes     Partners: Female   Other Topics Concern   • Not on file   Social History Narrative    Daily tea consumption     per [employer]     Social Drivers of Health     Financial Resource Strain: Not on file   Food Insecurity: Not on file   Transportation Needs: Not on file   Physical Activity: Not on file   Stress: Not on file   Social Connections: Not on file   Intimate Partner Violence: Not on file   Housing Stability: Not on file      Family History   Problem Relation Age of Onset   • Migraines Mother    • Depression Mother    • Alzheimer's disease Mother    • No Known Problems Father    • Arthritis Family    • Breast cancer Family    • Heart disease Family         cardiac disorder   • Hypertension Family    • Breast cancer Sister    • Asperger's syndrome Son      Past Surgical History:   Procedure Laterality Date   • ELBOW ARTHROPLASTY Bilateral     right elbow reconstruction surgery and transposition surgery of the left elbow   • KNEE ARTHROCENTESIS     • SHOULDER ARTHROPLASTY     • SHOULDER SURGERY  12/06/2018    AC Joint- Left   • SINUS SURGERY      with bone spur removed in uvulectomy done. Had [facility] at the time, Dr. Tapia ENT   • TONSILLECTOMY      tonsils removed and also removal of uvula, then a few years ago by Dr. Tapia.At that time he was having frequent sinus and throat infections, last assessed 12/19/16       Current Outpatient Medications:   •  atorvastatin (LIPITOR) 20 mg tablet, TAKE 1 TABLET BY MOUTH EVERY DAY, Disp: 90 tablet, Rfl: 0  •   gabapentin (NEURONTIN) 300 mg capsule, TAKE 1 CAPSULE BY MOUTH THREE TIMES DAILY, Disp: 90 capsule, Rfl: 0  •  Galcanezumab-gnlm 120 MG/ML SOAJ, First time administer 2 injections 2-3 hrs apart, then 1 injection every 30 days., Disp: 2 mL, Rfl: 3  •  Nerve Stimulator (Cefaly Electrode) MISC, Apply once daily prn migraine for 20 minutes., Disp: 1 each, Rfl: 0  •  rimegepant sulfate (Nurtec) 75 mg TBDP, Take 1 tablet (75 mg total) by mouth once as needed (migraine) for up to 1 dose, Disp: 8 tablet, Rfl: 1  •  verapamil (VERELAN PM) 120 MG 24 hr capsule, Take 1 capsule (120 mg total) by mouth daily at bedtime, Disp: 30 capsule, Rfl: 4  •  cephalexin (KEFLEX) 500 mg capsule, take 1 capsule by oral route 3 times every day for post op antibiotics (Patient not taking: Reported on 2/19/2025), Disp: , Rfl:   •  cephalexin (KEFLEX) 750 MG capsule, Take 1 capsule by mouth every 12 (twelve) hours (Patient not taking: Reported on 2/19/2025), Disp: , Rfl:   •  diclofenac (VOLTAREN) 75 mg EC tablet, Take by mouth Every 12 hours (Patient not taking: Reported on 3/3/2025), Disp: , Rfl:   •  oxyCODONE (ROXICODONE) 5 immediate release tablet, Take by mouth 6 (six) times a day (Patient not taking: Reported on 3/3/2025), Disp: , Rfl:     Current Facility-Administered Medications:   •  Botulinum Toxin Type A SOLR 200 Units, 200 Units, Injection, Q3 Months, Nneka Menard PA-C, 200 Units at 07/15/22 0934  •  Botulinum Toxin Type A SOLR 200 Units, 200 Units, Injection, Q3 Months, Alena Holden MD, 200 Units at 10/18/22 1059  •  Botulinum Toxin Type A SOLR 200 Units, 200 Units, Injection, Q3 Months, Alena Holden MD, 200 Units at 01/18/23 1545  •  Botulinum Toxin Type A SOLR 200 Units, 200 Units, Injection, Q3 Months, Alena Holden MD, 200 Units at 04/19/23 1658  •  Botulinum Toxin Type A SOLR 200 Units, 200 Units, Injection, Q3 Months, Alena Holden MD, 200 Units at 07/25/23 1311  •  Botulinum Toxin Type A SOLR 200 Units, 200 Units,  "Injection, Q3 Months, Alena Holden MD, 200 Units at 11/01/23 1300  •  Botulinum Toxin Type A SOLR 200 Units, 200 Units, Injection, Q3 Months, Alena Holden MD, 200 Units at 02/08/24 1229  •  Botulinum Toxin Type A SOLR 200 Units, 200 Units, Injection, Q3 Months, , 200 Units at 05/20/24 1303  •  Botulinum Toxin Type A SOLR 200 Units, 200 Units, Injection, Q3 Months, , 200 Units at 08/27/24 1308  •  Botulinum Toxin Type A SOLR 200 Units, 200 Units, Injection, Q3 Months, , 200 Units at 12/05/24 1224  No Known Allergies    Labs:not applicable  Imaging: No results found.    Review of Systems:  Review of Systems   All other systems reviewed and are negative.      Physical Exam:  /72 (BP Location: Left arm, Patient Position: Sitting, Cuff Size: Standard)   Pulse 74   Ht 5' 9\" (1.753 m)   Wt 87.1 kg (192 lb)   SpO2 98%   BMI 28.35 kg/m²   Physical Exam  Vitals reviewed.   Constitutional:       Appearance: He is well-developed.   HENT:      Head: Normocephalic and atraumatic.   Cardiovascular:      Rate and Rhythm: Normal rate.      Heart sounds: Normal heart sounds.   Pulmonary:      Effort: Pulmonary effort is normal.      Breath sounds: Normal breath sounds.   Musculoskeletal:      Cervical back: Normal range of motion.   Skin:     General: Skin is warm and dry.   Neurological:      Mental Status: He is alert and oriented to person, place, and time.       Discussion/Summary:I will continue the patient's present medical regimen.  I have asked the patient to call if there is a problem in the interim otherwise I will see the patient in one years time.  "

## 2025-03-03 ENCOUNTER — OFFICE VISIT (OUTPATIENT)
Age: 55
End: 2025-03-03
Payer: COMMERCIAL

## 2025-03-03 VITALS
WEIGHT: 191 LBS | SYSTOLIC BLOOD PRESSURE: 138 MMHG | HEART RATE: 77 BPM | BODY MASS INDEX: 28.29 KG/M2 | DIASTOLIC BLOOD PRESSURE: 66 MMHG | HEIGHT: 69 IN

## 2025-03-03 DIAGNOSIS — G43.709 CHRONIC MIGRAINE WITHOUT AURA: Primary | ICD-10-CM

## 2025-03-03 PROCEDURE — 99214 OFFICE O/P EST MOD 30 MIN: CPT | Performed by: PSYCHIATRY & NEUROLOGY

## 2025-03-03 NOTE — LETTER
March 3, 2025     Patient: Tha Alonso  YOB: 1970  Date of Visit: 3/3/2025      To Whom it May Concern:    Tha Alonso is under my professional care. Tha was seen in my office on 3/3/2025.   Patient suffers from migraines. We request his car windows to be tinted to highest grade allowed per state. This will help eliminate some of the migraines he experiences.      If you have any questions or concerns, please don't hesitate to call.         Sincerely,          Alena Holden MD        CC: No Recipients

## 2025-03-03 NOTE — PROGRESS NOTES
Return NeuroOutpatient Note        Tha Alonso  [id number]  54 y.o.  1970       Migraine (F/u migraines for botox approval )        History obtained from:  Patient     HPI/Subjective:    Tha Alonso is a 55 yo M with PMH of migraines presents as f/u.  Patient has been getting migraines since age of 10. He had reported migraine frequency of 8+ a month. He had concern for sob so wanted to hold off with botox which he did for about a year. He restarted botox in Aug of 2020. Patient gets botox every 3 months. Currently, frequency of his headaches are about 4-5/month. These are of intensity 7/10.   He's on Emgality monthly. He's on nurtec prn.   He also uses Cefaly nerve stimulator.  It helps to some extent.  He's on verapamil 120mg daily for prevention as well. He's on Nurtec prn.      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      Over the years, he had been relatively well controlled with migraines all these years until recently.      His father  of leukemia but he had it later in age.       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 [employer]   Tobacco Use   • Smoking status: Never   • Smokeless tobacco: Never   • Tobacco comments:     never smoked   Vaping Use   • Vaping status: Never Used   Substance and Sexual Activity   • Alcohol use: Not Currently     Comment: rare   • Drug use: No     Comment: social per [employer]   • Sexual activity: Yes     Partners: Female   Other Topics  Concern   • Not on file   Social History Narrative    Daily tea consumption     per [employer]     Social Drivers of Health     Financial Resource Strain: Not on file   Food Insecurity: Not on file   Transportation Needs: Not on file   Physical Activity: Not on file   Stress: Not on file   Social Connections: Not on file   Intimate Partner Violence: Not on file   Housing Stability: Not on file     Family History   Problem Relation Age of Onset   • Migraines Mother    • Depression Mother    • Alzheimer's disease Mother    • No Known Problems Father    • Arthritis Family    • Breast cancer Family    • Heart disease Family         cardiac disorder   • Hypertension Family    • Breast cancer Sister    • Asperger's syndrome Son      No Known Allergies  Current Outpatient Medications on File Prior to Visit   Medication Sig Dispense Refill   • atorvastatin (LIPITOR) 20 mg tablet TAKE 1 TABLET BY MOUTH EVERY DAY 90 tablet 0   • gabapentin (NEURONTIN) 300 mg capsule TAKE 1 CAPSULE BY MOUTH THREE TIMES DAILY 90 capsule 0   • Galcanezumab-gnlm 120 MG/ML SOAJ First time administer 2 injections 2-3 hrs apart, then 1 injection every 30 days. 2 mL 3   • Nerve Stimulator (Cefaly Electrode) MISC Apply once daily prn migraine for 20 minutes. 1 each 0   • rimegepant sulfate (Nurtec) 75 mg TBDP Take 1 tablet (75 mg total) by mouth once as needed (migraine) for up to 1 dose 8 tablet 1   • verapamil (VERELAN PM) 120 MG 24 hr capsule Take 1 capsule (120 mg total) by mouth daily at bedtime 30 capsule 4   • cephalexin (KEFLEX) 500 mg capsule take 1 capsule by oral route 3 times every day for post op antibiotics (Patient not taking: Reported on 2/19/2025)     • cephalexin (KEFLEX) 750 MG capsule Take 1 capsule by mouth every 12 (twelve) hours (Patient not taking: Reported on 3/3/2025)     • diclofenac (VOLTAREN) 75 mg EC tablet Take by mouth Every 12 hours (Patient not taking: Reported on 3/3/2025)     • oxyCODONE (ROXICODONE) 5  "immediate release tablet Take by mouth 6 (six) times a day (Patient not taking: Reported on 3/3/2025)       Current Facility-Administered Medications on File Prior to Visit   Medication Dose Route Frequency Provider Last Rate Last Admin   • Botulinum Toxin Type A SOLR 200 Units  200 Units Injection Q3 Months Nneka Menard PA-C   200 Units at 07/15/22 0934   • Botulinum Toxin Type A SOLR 200 Units  200 Units Injection Q3 Months Alena Holden MD   200 Units at 10/18/22 1059   • Botulinum Toxin Type A SOLR 200 Units  200 Units Injection Q3 Months Alena Holden MD   200 Units at 01/18/23 1545   • Botulinum Toxin Type A SOLR 200 Units  200 Units Injection Q3 Months Alena Holden MD   200 Units at 04/19/23 1658   • Botulinum Toxin Type A SOLR 200 Units  200 Units Injection Q3 Months Alena Holden MD   200 Units at 07/25/23 1311   • Botulinum Toxin Type A SOLR 200 Units  200 Units Injection Q3 Months Alena Holden MD   200 Units at 11/01/23 1300   • Botulinum Toxin Type A SOLR 200 Units  200 Units Injection Q3 Months Alena Holden MD   200 Units at 02/08/24 1229   • Botulinum Toxin Type A SOLR 200 Units  200 Units Injection Q3 Months    200 Units at 05/20/24 1303   • Botulinum Toxin Type A SOLR 200 Units  200 Units Injection Q3 Months    200 Units at 08/27/24 1308   • Botulinum Toxin Type A SOLR 200 Units  200 Units Injection Q3 Months    200 Units at 12/05/24 1224         Review of Systems   Refer to positive review of systems in HPI.   Review of Systems    Constitutional- No fever  Eyes- No visual change  ENT- Hearing normal  CV- No chest pain  Resp- No Shortness of breath  GI- No diarrhea  - Bladder normal  MS- No Arthritis   Skin- No rash  Psych- No depression  Endo- No DM  Heme- No nodes    Vitals:    03/03/25 1134   BP: 138/66   BP Location: Left arm   Patient Position: Sitting   Cuff Size: Large   Pulse: 77   Weight: 86.6 kg (191 lb)   Height: 5' 9\" (1.753 m)       PHYSICAL EXAM:  Appearance: No Acute " Distress  Ophthalmoscopic: Disc Flat, Normal fundus  Mental status:  Orientation: Awake, Alert, and Orientedx3  Memory: Registation 3/3 Recall 3/3  Attention: normal  Knowledge: good  Language: No aphasia  Speech: No dysarthria  Cranial Nerves:  2 No Visual Defect on Confrontation, Pupils round, equal, reactive to light  3,4,6 Extraocular Movements Intact, no nystagmus  5 Facial Sensation Intact  7 No facial asymmetry  8 Intact hearing  9,10 Palate symmetric, normal gag  11 Good shoulder shrug  12 Tongue Midline  Gait: Stable  Coordination: No ataxia with finger to nose testing, and heel to shin  Sensory: Intact, Symmetric to pinprick, light touch, vibration, and joint position  Muscle Tone: Normal              Muscle exam:  Arm Right Left Leg Right Left   Deltoid 5/5 5/5 Iliopsoas 5/5 5/5   Biceps 5/5 5/5 Quads 5/5 5/5   Triceps 5/5 5/5 Hamstrings 5/5 5/5   Wrist Extension 5/5 5/5 Ankle Dorsi Flexion 5/5 5/5   Wrist Flexion 5/5 5/5 Ankle Plantar Flexion 5/5 5/5   Interossei 5/5 5/5 Ankle Eversion 5/5 5/5   APB 5/5 5/5 Ankle Inversion 5/5 5/5       Reflexes   RJ BJ TJ KJ AJ Plantars Menard's   Right 2+ 2+ 2+ 2+  Downgoing Not present   Left 2+ 2+ 2+ 2+  Downgoing Not present     Personal review of  Labs:                    Diagnoses and all orders for this visit:          Assessment & Plan  Chronic migraine without aura  Patient remains on daily verapamil 120mg daily and monthly emgality.  He is to take prn nurtec.   He is to resume botox every 3 months to maintain frequency of his migraines.                            Total time of encounter:  30 min  More than 50% of the time was used in counseling and/or coordination of care  Extent of counseling and/or coordination of care        Alena Holden MD  [facility]  [address] [place] [id number] [phone]

## 2025-03-03 NOTE — ASSESSMENT & PLAN NOTE
Patient remains on daily verapamil 120mg daily and monthly emgality.  He is to take prn nurtec.   He is to resume botox every 3 months to maintain frequency of his migraines.

## 2025-03-06 ENCOUNTER — TELEPHONE (OUTPATIENT)
Age: 55
End: 2025-03-06

## 2025-03-06 NOTE — TELEPHONE ENCOUNTER
Requesting office to fax medical clearance form 02/19 to [facility] Fax# [phone]

## 2025-03-07 NOTE — TELEPHONE ENCOUNTER
[facility] did not receive all the pages. The first few were missing. They are asking if this can be refaxed to them at [phone]. Thank you.

## 2025-03-10 ENCOUNTER — OFFICE VISIT (OUTPATIENT)
Dept: CARDIOLOGY CLINIC | Facility: CLINIC | Age: 55
End: 2025-03-10
Payer: COMMERCIAL

## 2025-03-10 VITALS
SYSTOLIC BLOOD PRESSURE: 100 MMHG | HEART RATE: 74 BPM | BODY MASS INDEX: 28.44 KG/M2 | DIASTOLIC BLOOD PRESSURE: 72 MMHG | HEIGHT: 69 IN | OXYGEN SATURATION: 98 % | WEIGHT: 192 LBS

## 2025-03-10 DIAGNOSIS — E78.00 HYPERCHOLESTEROLEMIA: Primary | ICD-10-CM

## 2025-03-10 DIAGNOSIS — R07.9 CHEST PAIN, UNSPECIFIED TYPE: ICD-10-CM

## 2025-03-10 DIAGNOSIS — Z82.49 FAMILY HISTORY OF CARDIAC DISORDER: ICD-10-CM

## 2025-03-10 PROCEDURE — 99214 OFFICE O/P EST MOD 30 MIN: CPT | Performed by: INTERNAL MEDICINE

## 2025-03-10 NOTE — PATIENT INSTRUCTIONS
I will continue the patient's present medical regimen.  I have asked the patient to call if there is a problem in the interim otherwise I will see the patient in one years time.

## 2025-03-20 ENCOUNTER — PROCEDURE VISIT (OUTPATIENT)
Age: 55
End: 2025-03-20
Payer: COMMERCIAL

## 2025-03-20 VITALS
OXYGEN SATURATION: 97 % | BODY MASS INDEX: 27.7 KG/M2 | WEIGHT: 187 LBS | HEIGHT: 69 IN | DIASTOLIC BLOOD PRESSURE: 84 MMHG | SYSTOLIC BLOOD PRESSURE: 140 MMHG | HEART RATE: 82 BPM

## 2025-03-20 DIAGNOSIS — G43.719 INTRACTABLE CHRONIC MIGRAINE WITHOUT AURA AND WITHOUT STATUS MIGRAINOSUS: Primary | ICD-10-CM

## 2025-03-20 PROCEDURE — 64615 CHEMODENERV MUSC MIGRAINE: CPT | Performed by: PSYCHIATRY & NEUROLOGY

## 2025-03-20 NOTE — PROGRESS NOTES
"Universal Protocol   procedure performed by consultantConsent: Written consent obtained.  Consent given by: patient  Time out: Immediately prior to procedure a \"time out\" was called to verify the correct patient, procedure, equipment, support staff and site/side marked as required.  Timeout called at: 3/20/2025 11:49 AM.      Chemodenervation     Date/Time  3/20/2025 11:30 AM     Performed by  Alena Holden MD   Authorized by  Alena Holden MD     Pre-procedure details      Prepped With: Alcohol     Anesthesia  (see MAR for exact dosages):     Anesthesia method:  None   Procedure details      Position:  Upright   Botox      Botox Type:  Type A    Brand:  Botox    Final Concentration per CC:  50 units    Needle Gauge:  30 G 2.5 inch   Procedures      Botox Procedures: chronic headache      Indications: migraines     Post-procedure details      Chemodenervation:  Chronic migraine    Facial Nerve Location::  Bilateral facial nerve    Patient tolerance of procedure:  Tolerated well, no immediate complications       10 units, 2 sites unit(s) was injected into the procerus muscle.    5 unit(s) was injected into the  right  muscle.    5 unit(s) was injected into the  left  muscle.    10 unit(s) was injected into the  right frontalis muscle.    10 unit(s) was injected into the  left frontalis muscle.    40 unit(s) was injected into the  right temporalis muscle.    40 u, 8 sites unit(s) was injected into the  left temporalis muscle.    15 unit(s) was injected into the  right occipitalis muscle.    15 unit(s) was injected into the  left occipitalis muscle.    10 unit(s) was injected into the  right cervical paraspinal muscle.    10 unit(s) was injected into the  left cervical paraspinal muscle.    15 unit(s) was injected into the  right trapezius muscle.    15 unit(s) was injected into the  left trapezius muscle.       A total of 200units were used. A total of 0units were discarded.     Diagnosis ICD-10-CM " Associated Orders   1. Intractable chronic migraine without aura and without status migrainosus  G43.719 Botulinum Toxin Type A SOLR 200 Units     Chemodenervation

## 2025-03-26 ENCOUNTER — TELEPHONE (OUTPATIENT)
Age: 55
End: 2025-03-26

## 2025-03-26 NOTE — TELEPHONE ENCOUNTER
Received fax from [facility]. Emgality PA is about to   Key [id number]    Per enc 25, Emgality PA will  on 25    Will initiate closer to exp date

## 2025-03-28 DIAGNOSIS — R05.9 COUGH: ICD-10-CM

## 2025-03-28 DIAGNOSIS — E78.00 HYPERCHOLESTEROLEMIA: ICD-10-CM

## 2025-03-28 RX ORDER — GABAPENTIN 300 MG/1
300 CAPSULE ORAL 3 TIMES DAILY
Qty: 90 CAPSULE | Refills: 0 | OUTPATIENT
Start: 2025-03-28

## 2025-03-28 RX ORDER — GABAPENTIN 300 MG/1
300 CAPSULE ORAL 3 TIMES DAILY
Qty: 90 CAPSULE | Refills: 0 | Status: SHIPPED | OUTPATIENT
Start: 2025-03-28

## 2025-03-28 RX ORDER — ATORVASTATIN CALCIUM 20 MG/1
20 TABLET, FILM COATED ORAL DAILY
Qty: 90 TABLET | Refills: 0 | Status: SHIPPED | OUTPATIENT
Start: 2025-03-28

## 2025-04-02 NOTE — TELEPHONE ENCOUNTER
Per CMM, Emgality-  Your PA request has been approved. Additional information will be provided in the approval communication. (Message [id number]). Authorization Expiration Date: April 1, 2026.     Approval letter scanned in media

## 2025-05-16 ENCOUNTER — OFFICE VISIT (OUTPATIENT)
Dept: FAMILY MEDICINE CLINIC | Facility: CLINIC | Age: 55
End: 2025-05-16
Payer: COMMERCIAL

## 2025-05-16 VITALS
OXYGEN SATURATION: 96 % | HEART RATE: 101 BPM | DIASTOLIC BLOOD PRESSURE: 80 MMHG | RESPIRATION RATE: 14 BRPM | TEMPERATURE: 97.7 F | WEIGHT: 183 LBS | SYSTOLIC BLOOD PRESSURE: 134 MMHG | BODY MASS INDEX: 27.02 KG/M2

## 2025-05-16 DIAGNOSIS — H66.90 ACUTE OTITIS MEDIA, UNSPECIFIED OTITIS MEDIA TYPE: Primary | ICD-10-CM

## 2025-05-16 PROCEDURE — 99213 OFFICE O/P EST LOW 20 MIN: CPT | Performed by: FAMILY MEDICINE

## 2025-05-16 RX ORDER — CIPROFLOXACIN AND DEXAMETHASONE 3; 1 MG/ML; MG/ML
4 SUSPENSION/ DROPS AURICULAR (OTIC) 2 TIMES DAILY
Qty: 7.5 ML | Refills: 0 | Status: SHIPPED | OUTPATIENT
Start: 2025-05-16 | End: 2025-05-23

## 2025-05-16 NOTE — PROGRESS NOTES
Name: Tha Alonso      : 1970      MRN: [id number]  Encounter Provider: Frank Lombardi, DO  Encounter Date: 2025   Encounter department: [facility]  :  Assessment & Plan  Acute otitis media, unspecified otitis media type    Orders:  •  ciprofloxacin-dexamethasone (CIPRODEX) otic suspension; Administer 4 drops into both ears 2 (two) times a day for 7 days  •  amoxicillin-clavulanate (AUGMENTIN) 875-125 mg per tablet; Take 1 tablet by mouth every 12 (twelve) hours for 10 days           History of Present Illness   Pt states he feels he has B?l ear infection  States they both hurt        Review of Systems   HENT:  Positive for ear discharge and ear pain.        Objective   /80   Pulse 101   Temp 97.7 °F (36.5 °C)   Resp 14   Wt 83 kg (183 lb)   SpO2 96%   BMI 27.02 kg/m²      Physical Exam  HENT:      Right Ear: Tympanic membrane is erythematous and bulging.      Left Ear: Tympanic membrane is erythematous and bulging.      Ears:      Comments: B/L scrape in lower cannal

## 2025-06-09 NOTE — PATIENT INSTRUCTIONS
Take medication with food  It is important that you take the entire course of antibiotics prescribed  May also take a probiotic of your choice to maintain healthy GI ari  Can take some probiotic and yogurt with the medication  Increase fluid intake, saline nasal rinses, and hot tea with honey and lemon  Cool air humidification can be helpful as well  May take Tylenol as needed for pain or fevers  Mucinex D for sinus congestion or Coricidin HBP if you have high blood pressure or a heart condition  Mucinex or Robitussin DM are effective for cough and chest congestion  Supportive care discussed and advised  Advised to RTO for any worsening and no improvement  Follow up for no improvement and worsening of conditions  Patient advised and educated when to see immediate medical care  Praveen Darling

## 2025-06-23 ENCOUNTER — PROCEDURE VISIT (OUTPATIENT)
Age: 55
End: 2025-06-23
Payer: COMMERCIAL

## 2025-06-23 VITALS
DIASTOLIC BLOOD PRESSURE: 78 MMHG | HEART RATE: 87 BPM | OXYGEN SATURATION: 97 % | WEIGHT: 190 LBS | SYSTOLIC BLOOD PRESSURE: 138 MMHG | BODY MASS INDEX: 28.14 KG/M2 | HEIGHT: 69 IN

## 2025-06-23 DIAGNOSIS — G43.709 CHRONIC MIGRAINE WITHOUT AURA: Primary | ICD-10-CM

## 2025-06-23 PROCEDURE — 64615 CHEMODENERV MUSC MIGRAINE: CPT | Performed by: PSYCHIATRY & NEUROLOGY

## 2025-06-23 NOTE — PROGRESS NOTES
"Universal Protocol   procedure performed by consultantConsent: Written consent obtained  Consent given by: patient  Time out: Immediately prior to procedure a \"time out\" was called to verify the correct patient, procedure, equipment, support staff and site/side marked as required.  Timeout called at: 6/23/2025 11:33 AM.      Chemodenervation     Date/Time  6/23/2025 11:00 AM     Performed by  Alena Holden MD   Authorized by  Alena Holden MD     Pre-procedure details      Prepped With: Alcohol     Anesthesia  (see MAR for exact dosages):     Anesthesia method:  None   Procedure details      Position:  Upright   Botox      Botox Type:  Type A    Brand:  Botox    Final Concentration per CC:  50 units    Needle Gauge:  30 G 2.5 inch   Procedures      Botox Procedures: chronic headache      Indications: migraines     Post-procedure details      Chemodenervation:  Chronic migraine    Facial Nerve Location::  Bilateral facial nerve    Patient tolerance of procedure:  Tolerated well, no immediate complications       10 units, 2 sites unit(s) was injected into the procerus muscle.    5 unit(s) was injected into the  right  muscle.    5 unit(s) was injected into the  left  muscle.    10 unit(s) was injected into the  right frontalis muscle.    10 unit(s) was injected into the  left frontalis muscle.    40 unit(s) was injected into the  right temporalis muscle.    40 u, 8 sites unit(s) was injected into the  left temporalis muscle.    15 unit(s) was injected into the  right occipitalis muscle.    15 unit(s) was injected into the  left occipitalis muscle.    10 unit(s) was injected into the  right cervical paraspinal muscle.    10 unit(s) was injected into the  left cervical paraspinal muscle.    15 unit(s) was injected into the  right trapezius muscle.    15 unit(s) was injected into the  left trapezius muscle.       A total of 200units were used. A total of 0units were discarded.       Diagnosis " ICD-10-CM Associated Orders   1. Chronic migraine without aura  G43.709 Chemodenervation

## 2025-07-02 DIAGNOSIS — E78.00 HYPERCHOLESTEROLEMIA: ICD-10-CM

## 2025-07-03 RX ORDER — ATORVASTATIN CALCIUM 20 MG/1
20 TABLET, FILM COATED ORAL DAILY
Qty: 90 TABLET | Refills: 0 | Status: SHIPPED | OUTPATIENT
Start: 2025-07-03